# Patient Record
Sex: FEMALE | Race: WHITE | Employment: OTHER | URBAN - METROPOLITAN AREA
[De-identification: names, ages, dates, MRNs, and addresses within clinical notes are randomized per-mention and may not be internally consistent; named-entity substitution may affect disease eponyms.]

---

## 2017-05-23 ENCOUNTER — OFFICE VISIT (OUTPATIENT)
Dept: FAMILY MEDICINE CLINIC | Age: 57
End: 2017-05-23

## 2017-05-23 ENCOUNTER — TELEPHONE (OUTPATIENT)
Dept: FAMILY MEDICINE CLINIC | Age: 57
End: 2017-05-23

## 2017-05-23 VITALS
WEIGHT: 207 LBS | DIASTOLIC BLOOD PRESSURE: 84 MMHG | SYSTOLIC BLOOD PRESSURE: 136 MMHG | TEMPERATURE: 101.5 F | BODY MASS INDEX: 31.47 KG/M2

## 2017-05-23 DIAGNOSIS — Z00.00 PREVENTATIVE HEALTH CARE: ICD-10-CM

## 2017-05-23 DIAGNOSIS — J20.9 ACUTE BRONCHITIS, UNSPECIFIED ORGANISM: Primary | ICD-10-CM

## 2017-05-23 PROCEDURE — 99213 OFFICE O/P EST LOW 20 MIN: CPT | Performed by: FAMILY MEDICINE

## 2017-05-23 RX ORDER — LEVOFLOXACIN 500 MG/1
500 TABLET, FILM COATED ORAL DAILY
Qty: 10 TABLET | Refills: 0 | Status: SHIPPED | OUTPATIENT
Start: 2017-05-23 | End: 2017-06-02

## 2017-05-23 RX ORDER — CETIRIZINE HYDROCHLORIDE 10 MG/1
10 TABLET ORAL DAILY
COMMUNITY
End: 2017-07-11

## 2017-05-23 RX ORDER — GUAIFENESIN AND CODEINE PHOSPHATE 100; 10 MG/5ML; MG/5ML
5 SOLUTION ORAL 4 TIMES DAILY PRN
Qty: 180 ML | Refills: 0 | Status: SHIPPED | OUTPATIENT
Start: 2017-05-23 | End: 2017-06-02

## 2017-05-23 ASSESSMENT — ENCOUNTER SYMPTOMS
RHINORRHEA: 1
SHORTNESS OF BREATH: 1
COUGH: 1
SINUS PRESSURE: 0
WHEEZING: 0
SORE THROAT: 1

## 2017-05-26 ENCOUNTER — HOSPITAL ENCOUNTER (OUTPATIENT)
Dept: CT IMAGING | Age: 57
Discharge: HOME | End: 2017-05-26
Attending: FAMILY MEDICINE | Admitting: FAMILY MEDICINE

## 2017-05-26 ENCOUNTER — TELEPHONE (OUTPATIENT)
Dept: FAMILY MEDICINE CLINIC | Age: 57
End: 2017-05-26

## 2017-05-26 DIAGNOSIS — R50.9 FEVER, UNSPECIFIED FEVER CAUSE: ICD-10-CM

## 2017-05-26 DIAGNOSIS — R50.9 FEVER, UNSPECIFIED FEVER CAUSE: Primary | ICD-10-CM

## 2017-05-26 DIAGNOSIS — R05.9 COUGH: Primary | ICD-10-CM

## 2017-05-26 DIAGNOSIS — R05.9 COUGH: ICD-10-CM

## 2017-05-30 ENCOUNTER — TELEPHONE (OUTPATIENT)
Dept: PULMONOLOGY | Age: 57
End: 2017-05-30

## 2017-05-31 ENCOUNTER — OFFICE VISIT (OUTPATIENT)
Dept: PULMONOLOGY | Age: 57
End: 2017-05-31

## 2017-05-31 VITALS
BODY MASS INDEX: 31.64 KG/M2 | OXYGEN SATURATION: 98 % | HEART RATE: 95 BPM | DIASTOLIC BLOOD PRESSURE: 66 MMHG | SYSTOLIC BLOOD PRESSURE: 104 MMHG | RESPIRATION RATE: 20 BRPM | WEIGHT: 208.8 LBS | HEIGHT: 68 IN | TEMPERATURE: 98.9 F

## 2017-05-31 DIAGNOSIS — R50.9 FEVER, UNSPECIFIED FEVER CAUSE: ICD-10-CM

## 2017-05-31 DIAGNOSIS — R91.8 LUNG NODULES: ICD-10-CM

## 2017-05-31 DIAGNOSIS — R50.9 FEVER, UNSPECIFIED FEVER CAUSE: Primary | ICD-10-CM

## 2017-05-31 LAB
BASOPHILS ABSOLUTE: 0 K/UL (ref 0–0.2)
BASOPHILS RELATIVE PERCENT: 0.8 %
EOSINOPHILS ABSOLUTE: 0 K/UL (ref 0–0.6)
EOSINOPHILS RELATIVE PERCENT: 0.8 %
EPITHELIAL CELLS, UA: 4 /HPF (ref 0–5)
HCT VFR BLD CALC: 33.9 % (ref 36–48)
HEMOGLOBIN: 11 G/DL (ref 12–16)
HYALINE CASTS: 1 /LPF (ref 0–8)
LYMPHOCYTES ABSOLUTE: 1.9 K/UL (ref 1–5.1)
LYMPHOCYTES RELATIVE PERCENT: 37 %
MCH RBC QN AUTO: 32.5 PG (ref 26–34)
MCHC RBC AUTO-ENTMCNC: 32.5 G/DL (ref 31–36)
MCV RBC AUTO: 99.9 FL (ref 80–100)
MONOCYTES ABSOLUTE: 0.6 K/UL (ref 0–1.3)
MONOCYTES RELATIVE PERCENT: 11.2 %
NEUTROPHILS ABSOLUTE: 2.5 K/UL (ref 1.7–7.7)
NEUTROPHILS RELATIVE PERCENT: 50.2 %
PDW BLD-RTO: 16.4 % (ref 12.4–15.4)
PLATELET # BLD: 296 K/UL (ref 135–450)
PMV BLD AUTO: 7.8 FL (ref 5–10.5)
RBC # BLD: 3.39 M/UL (ref 4–5.2)
RBC UA: 2 /HPF (ref 0–4)
WBC # BLD: 5 K/UL (ref 4–11)
WBC UA: 1 /HPF (ref 0–5)

## 2017-05-31 PROCEDURE — 99204 OFFICE O/P NEW MOD 45 MIN: CPT | Performed by: INTERNAL MEDICINE

## 2017-06-01 LAB
T4 FREE: 1.1 NG/DL (ref 0.9–1.8)
TSH REFLEX FT4: 13.66 UIU/ML (ref 0.27–4.2)

## 2017-06-02 ENCOUNTER — TELEPHONE (OUTPATIENT)
Dept: PULMONOLOGY | Age: 57
End: 2017-06-02

## 2017-06-02 ENCOUNTER — HOSPITAL ENCOUNTER (OUTPATIENT)
Dept: OTHER | Age: 57
Discharge: HOME | End: 2017-06-02
Attending: INTERNAL MEDICINE | Admitting: INTERNAL MEDICINE

## 2017-06-02 DIAGNOSIS — Z01.818 PREOP TESTING: Primary | ICD-10-CM

## 2017-06-02 DIAGNOSIS — Z01.818 PRE-OP TESTING: Primary | ICD-10-CM

## 2017-06-02 LAB
INR BLD: 0.97 (ref 0.85–1.15)
PROTHROMBIN TIME: 11 SEC (ref 9.6–13)

## 2017-06-02 RX ORDER — SODIUM CHLORIDE 9 MG/ML
INJECTION, SOLUTION INTRAVENOUS CONTINUOUS
Status: CANCELLED | OUTPATIENT
Start: 2017-06-05

## 2017-06-05 ENCOUNTER — HOSPITAL ENCOUNTER (OUTPATIENT)
Dept: ENDOSCOPY | Age: 57
Discharge: HOME | End: 2017-06-05
Attending: INTERNAL MEDICINE | Admitting: INTERNAL MEDICINE

## 2017-06-05 VITALS
RESPIRATION RATE: 16 BRPM | SYSTOLIC BLOOD PRESSURE: 126 MMHG | HEART RATE: 80 BPM | TEMPERATURE: 96.9 F | DIASTOLIC BLOOD PRESSURE: 63 MMHG | OXYGEN SATURATION: 94 %

## 2017-06-05 LAB
BLOOD CULTURE, ROUTINE: NORMAL
CULTURE, BLOOD 2: NORMAL
THROAT CULTURE: NORMAL

## 2017-06-05 PROCEDURE — 31624 DX BRONCHOSCOPE/LAVAGE: CPT | Performed by: INTERNAL MEDICINE

## 2017-06-05 PROCEDURE — 99152 MOD SED SAME PHYS/QHP 5/>YRS: CPT | Performed by: INTERNAL MEDICINE

## 2017-06-05 RX ORDER — MIDAZOLAM HYDROCHLORIDE 1 MG/ML
INJECTION INTRAMUSCULAR; INTRAVENOUS
Status: COMPLETED | OUTPATIENT
Start: 2017-06-05 | End: 2017-06-05

## 2017-06-05 RX ORDER — FENTANYL CITRATE 50 UG/ML
INJECTION, SOLUTION INTRAMUSCULAR; INTRAVENOUS
Status: COMPLETED | OUTPATIENT
Start: 2017-06-05 | End: 2017-06-05

## 2017-06-05 RX ADMIN — MIDAZOLAM HYDROCHLORIDE 2 MG: 1 INJECTION INTRAMUSCULAR; INTRAVENOUS at 13:17

## 2017-06-05 RX ADMIN — FENTANYL CITRATE 50 MCG: 50 INJECTION, SOLUTION INTRAMUSCULAR; INTRAVENOUS at 13:17

## 2017-06-05 ASSESSMENT — PAIN SCALES - GENERAL
PAINLEVEL_OUTOF10: 0

## 2017-06-07 LAB
CULTURE, RESPIRATORY: NORMAL
GRAM STAIN RESULT: NORMAL
LEGIONELLA PNEUMOPHILIA DFA SOURCE: NORMAL
LEGIONELLA PNEUMOPHILIA DFA: NEGATIVE

## 2017-06-08 ENCOUNTER — TELEPHONE (OUTPATIENT)
Dept: PULMONOLOGY | Age: 57
End: 2017-06-08

## 2017-06-15 LAB
APPEARANCE BAL (LAVAGE): ABNORMAL
COLOR LAVAGE: COLORLESS
EPITHELIAL CELLS FLUID: 21 %
LYMPHOCYTES, BAL: 56 % (ref 5–10)
MACROPHAGES, BAL: 19 % (ref 90–95)
NUMBER OF CELLS COUNTED BAL (LAVAGE): 200
RBC, BAL: 11 /CUMM
SEGMENTED NEUTROPHILS, BAL: 4 % (ref 5–10)
WBC/EPI CELLS BAL: 171 /CUMM

## 2017-06-19 LAB
FINAL REPORT: NORMAL
PRELIMINARY: NORMAL

## 2017-06-29 ENCOUNTER — OFFICE VISIT (OUTPATIENT)
Dept: PULMONOLOGY | Age: 57
End: 2017-06-29

## 2017-06-29 VITALS
DIASTOLIC BLOOD PRESSURE: 70 MMHG | RESPIRATION RATE: 16 BRPM | OXYGEN SATURATION: 96 % | SYSTOLIC BLOOD PRESSURE: 102 MMHG | TEMPERATURE: 98 F | BODY MASS INDEX: 31.71 KG/M2 | WEIGHT: 209.2 LBS | HEIGHT: 68 IN | HEART RATE: 76 BPM

## 2017-06-29 DIAGNOSIS — R91.8 LUNG NODULES: ICD-10-CM

## 2017-06-29 DIAGNOSIS — R05.9 COUGH: Primary | ICD-10-CM

## 2017-06-29 PROCEDURE — 99213 OFFICE O/P EST LOW 20 MIN: CPT | Performed by: INTERNAL MEDICINE

## 2017-06-29 RX ORDER — PREDNISONE 20 MG/1
TABLET ORAL
Qty: 11 TABLET | Refills: 0 | Status: SHIPPED | OUTPATIENT
Start: 2017-06-29 | End: 2017-07-11 | Stop reason: ALTCHOICE

## 2017-07-10 LAB
FUNGUS (MYCOLOGY) CULTURE: NORMAL
FUNGUS STAIN: NORMAL

## 2017-07-11 ENCOUNTER — OFFICE VISIT (OUTPATIENT)
Dept: FAMILY MEDICINE CLINIC | Age: 57
End: 2017-07-11

## 2017-07-11 VITALS
SYSTOLIC BLOOD PRESSURE: 118 MMHG | HEIGHT: 68 IN | DIASTOLIC BLOOD PRESSURE: 78 MMHG | WEIGHT: 201 LBS | BODY MASS INDEX: 30.46 KG/M2

## 2017-07-11 DIAGNOSIS — Z00.00 WELL ADULT EXAM: Primary | ICD-10-CM

## 2017-07-11 DIAGNOSIS — Z12.11 SCREENING FOR COLON CANCER: ICD-10-CM

## 2017-07-11 DIAGNOSIS — Z00.00 PREVENTATIVE HEALTH CARE: ICD-10-CM

## 2017-07-11 LAB
ALT SERPL-CCNC: 32 U/L (ref 10–40)
ANION GAP SERPL CALCULATED.3IONS-SCNC: 18 MMOL/L (ref 3–16)
AST SERPL-CCNC: 28 U/L (ref 15–37)
BUN BLDV-MCNC: 16 MG/DL (ref 7–20)
CALCIUM SERPL-MCNC: 9.8 MG/DL (ref 8.3–10.6)
CHLORIDE BLD-SCNC: 93 MMOL/L (ref 99–110)
CHOLESTEROL, TOTAL: 239 MG/DL (ref 0–199)
CO2: 27 MMOL/L (ref 21–32)
CREAT SERPL-MCNC: 0.7 MG/DL (ref 0.6–1.1)
GFR AFRICAN AMERICAN: >60
GFR NON-AFRICAN AMERICAN: >60
GLUCOSE BLD-MCNC: 82 MG/DL (ref 70–99)
HCT VFR BLD CALC: 41.5 % (ref 36–48)
HDLC SERPL-MCNC: 55 MG/DL (ref 40–60)
HEMOGLOBIN: 13.9 G/DL (ref 12–16)
LDL CHOLESTEROL CALCULATED: 134 MG/DL
MCH RBC QN AUTO: 33.4 PG (ref 26–34)
MCHC RBC AUTO-ENTMCNC: 33.5 G/DL (ref 31–36)
MCV RBC AUTO: 99.6 FL (ref 80–100)
PDW BLD-RTO: 16.3 % (ref 12.4–15.4)
PLATELET # BLD: 326 K/UL (ref 135–450)
PMV BLD AUTO: 7.5 FL (ref 5–10.5)
POTASSIUM SERPL-SCNC: 3.5 MMOL/L (ref 3.5–5.1)
RBC # BLD: 4.17 M/UL (ref 4–5.2)
SODIUM BLD-SCNC: 138 MMOL/L (ref 136–145)
TRIGL SERPL-MCNC: 248 MG/DL (ref 0–150)
TSH SERPL DL<=0.05 MIU/L-ACNC: 0.97 UIU/ML (ref 0.27–4.2)
VLDLC SERPL CALC-MCNC: 50 MG/DL
WBC # BLD: 7.4 K/UL (ref 4–11)

## 2017-07-11 PROCEDURE — 99396 PREV VISIT EST AGE 40-64: CPT | Performed by: FAMILY MEDICINE

## 2017-07-11 PROCEDURE — 36415 COLL VENOUS BLD VENIPUNCTURE: CPT | Performed by: FAMILY MEDICINE

## 2017-07-11 PROCEDURE — 82274 ASSAY TEST FOR BLOOD FECAL: CPT | Performed by: FAMILY MEDICINE

## 2017-07-11 RX ORDER — FUROSEMIDE 40 MG/1
40 TABLET ORAL DAILY
Qty: 30 TABLET | Refills: 5 | Status: SHIPPED | OUTPATIENT
Start: 2017-07-11 | End: 2018-03-12 | Stop reason: SDUPTHER

## 2017-07-11 RX ORDER — LEVOTHYROXINE SODIUM 0.12 MG/1
TABLET ORAL
Qty: 30 TABLET | Refills: 5 | Status: SHIPPED | OUTPATIENT
Start: 2017-07-11 | End: 2017-08-02 | Stop reason: SDUPTHER

## 2017-07-11 ASSESSMENT — ENCOUNTER SYMPTOMS
BLOOD IN STOOL: 0
DIARRHEA: 0
SHORTNESS OF BREATH: 0
NAUSEA: 0
RHINORRHEA: 0
COUGH: 1
WHEEZING: 0
SORE THROAT: 0
VOMITING: 0
ABDOMINAL PAIN: 0
CONSTIPATION: 0

## 2017-07-12 DIAGNOSIS — E78.2 HYPERLIPIDEMIA, MIXED: ICD-10-CM

## 2017-07-24 LAB
CONTROL: NORMAL
HEMOCCULT STL QL: NEGATIVE

## 2017-07-25 LAB
AFB CULTURE (MYCOBACTERIA): NORMAL
AFB SMEAR: NORMAL

## 2017-08-02 RX ORDER — LEVOTHYROXINE SODIUM 0.12 MG/1
TABLET ORAL
Qty: 30 TABLET | Refills: 0 | Status: SHIPPED | OUTPATIENT
Start: 2017-08-02 | End: 2018-02-16 | Stop reason: SDUPTHER

## 2017-08-02 RX ORDER — METOLAZONE 2.5 MG/1
TABLET ORAL
Qty: 30 TABLET | Refills: 0 | Status: SHIPPED | OUTPATIENT
Start: 2017-08-02 | End: 2017-08-31 | Stop reason: SDUPTHER

## 2017-11-27 ENCOUNTER — OFFICE VISIT (OUTPATIENT)
Dept: FAMILY MEDICINE CLINIC | Age: 57
End: 2017-11-27

## 2017-11-27 VITALS
DIASTOLIC BLOOD PRESSURE: 80 MMHG | WEIGHT: 204 LBS | HEIGHT: 68 IN | BODY MASS INDEX: 30.92 KG/M2 | SYSTOLIC BLOOD PRESSURE: 132 MMHG

## 2017-11-27 DIAGNOSIS — R60.0 EDEMA OF BOTH LEGS: Primary | ICD-10-CM

## 2017-11-27 PROCEDURE — 99213 OFFICE O/P EST LOW 20 MIN: CPT | Performed by: FAMILY MEDICINE

## 2017-11-27 RX ORDER — AMOXICILLIN 875 MG/1
875 TABLET, COATED ORAL 2 TIMES DAILY
Qty: 20 TABLET | Refills: 0 | Status: SHIPPED | OUTPATIENT
Start: 2017-11-27 | End: 2017-12-07

## 2017-11-27 RX ORDER — GUAIFENESIN AND CODEINE PHOSPHATE 100; 10 MG/5ML; MG/5ML
5 SOLUTION ORAL 4 TIMES DAILY PRN
Qty: 180 ML | Refills: 0 | Status: SHIPPED | OUTPATIENT
Start: 2017-11-27 | End: 2017-12-07

## 2017-11-27 RX ORDER — FLUTICASONE PROPIONATE 50 MCG
2 SPRAY, SUSPENSION (ML) NASAL DAILY
Qty: 1 BOTTLE | Refills: 0 | Status: SHIPPED | OUTPATIENT
Start: 2017-11-27 | End: 2018-01-14

## 2017-11-27 ASSESSMENT — ENCOUNTER SYMPTOMS
SHORTNESS OF BREATH: 1
RHINORRHEA: 1
WHEEZING: 1
SINUS PRESSURE: 1
SINUS PAIN: 1
SORE THROAT: 1
COUGH: 1

## 2017-11-27 NOTE — PROGRESS NOTES
Subjective:      Patient ID: Pankaj Hamilton is a 64 y.o. female. HPI     Edema:  Patient takes Lasix 40 mg and Zaroxolyn 2.5 mg daily along with KCl 20 mEq daily. She feels that her edema is well controlled and stable. Upper Respiratory Infection:  Pt started 6 days ago with productive cough, wheezing and dyspnea. She has sinus pain and drainage. She has been taking Mucinex-DM with minimal relief. She is not a smoker. Review of Systems   Constitutional: Negative for chills and fever. HENT: Positive for postnasal drip, rhinorrhea, sinus pain, sinus pressure, sneezing and sore throat. Negative for congestion. Respiratory: Positive for cough, shortness of breath and wheezing. /80 (Site: Right Arm)   Ht 5' 8\" (1.727 m)   Wt 204 lb (92.5 kg)   BMI 31.02 kg/m²    Objective:   Physical Exam   Constitutional: She is oriented to person, place, and time. She appears well-developed and well-nourished. No distress. HENT:   Head: Normocephalic. Right Ear: External ear normal.   Left Ear: External ear normal.   Mouth/Throat: Oropharynx is clear and moist. No oropharyngeal exudate. Neck: No JVD present. No thyromegaly present. Cardiovascular: Normal rate, regular rhythm and normal heart sounds. No murmur heard. Pulmonary/Chest: Effort normal and breath sounds normal. She has no wheezes. She has no rales. Musculoskeletal: She exhibits no edema. Lymphadenopathy:     She has no cervical adenopathy. Neurological: She is alert and oriented to person, place, and time. Assessment:      Edema   Acute Maxillary Sinusitis       Plan:      Rx Amoxicillin 875 mg BID for 10 days. Rx Flonase NS once daily. Rx Tussi-Organidin for cough.    Reminded patient to have a GYN exam  RTO 8 months for Hypothyroidism / Edema

## 2018-01-15 ENCOUNTER — OFFICE VISIT (OUTPATIENT)
Dept: FAMILY MEDICINE CLINIC | Age: 58
End: 2018-01-15

## 2018-01-15 ENCOUNTER — HOSPITAL ENCOUNTER (OUTPATIENT)
Dept: OTHER | Age: 58
Discharge: HOME | End: 2018-01-15
Attending: FAMILY MEDICINE | Admitting: FAMILY MEDICINE

## 2018-01-15 VITALS
BODY MASS INDEX: 31.07 KG/M2 | WEIGHT: 205 LBS | SYSTOLIC BLOOD PRESSURE: 122 MMHG | HEIGHT: 68 IN | DIASTOLIC BLOOD PRESSURE: 82 MMHG

## 2018-01-15 DIAGNOSIS — R05.3 CHRONIC COUGH: ICD-10-CM

## 2018-01-15 DIAGNOSIS — J30.1 CHRONIC SEASONAL ALLERGIC RHINITIS DUE TO POLLEN: ICD-10-CM

## 2018-01-15 DIAGNOSIS — L40.50 PSORIATIC ARTHRITIS (HCC): ICD-10-CM

## 2018-01-15 DIAGNOSIS — E03.9 HYPOTHYROIDISM (ACQUIRED): Primary | ICD-10-CM

## 2018-01-15 DIAGNOSIS — R60.0 EDEMA OF BOTH LEGS: ICD-10-CM

## 2018-01-15 LAB
ANION GAP SERPL CALCULATED.3IONS-SCNC: 16 MMOL/L (ref 3–16)
BUN BLDV-MCNC: 15 MG/DL (ref 7–20)
CALCIUM SERPL-MCNC: 10.4 MG/DL (ref 8.3–10.6)
CHLORIDE BLD-SCNC: 95 MMOL/L (ref 99–110)
CO2: 31 MMOL/L (ref 21–32)
CREAT SERPL-MCNC: 0.8 MG/DL (ref 0.6–1.1)
GFR AFRICAN AMERICAN: >60
GFR NON-AFRICAN AMERICAN: >60
GLUCOSE BLD-MCNC: 78 MG/DL (ref 70–99)
POTASSIUM SERPL-SCNC: 3.5 MMOL/L (ref 3.5–5.1)
SODIUM BLD-SCNC: 142 MMOL/L (ref 136–145)

## 2018-01-15 PROCEDURE — 36415 COLL VENOUS BLD VENIPUNCTURE: CPT | Performed by: FAMILY MEDICINE

## 2018-01-15 PROCEDURE — 99214 OFFICE O/P EST MOD 30 MIN: CPT | Performed by: FAMILY MEDICINE

## 2018-01-15 RX ORDER — FLUTICASONE PROPIONATE 50 MCG
2 SPRAY, SUSPENSION (ML) NASAL DAILY
Qty: 1 BOTTLE | Refills: 5 | Status: SHIPPED | OUTPATIENT
Start: 2018-01-15 | End: 2018-05-25

## 2018-01-15 RX ORDER — MONTELUKAST SODIUM 10 MG/1
10 TABLET ORAL DAILY
Qty: 30 TABLET | Refills: 5 | Status: SHIPPED | OUTPATIENT
Start: 2018-01-15 | End: 2018-05-25

## 2018-01-15 ASSESSMENT — ENCOUNTER SYMPTOMS: COUGH: 1

## 2018-03-01 ENCOUNTER — OFFICE VISIT (OUTPATIENT)
Dept: ORTHOPEDIC SURGERY | Age: 58
End: 2018-03-01

## 2018-03-01 VITALS
BODY MASS INDEX: 31.07 KG/M2 | HEART RATE: 92 BPM | SYSTOLIC BLOOD PRESSURE: 129 MMHG | DIASTOLIC BLOOD PRESSURE: 88 MMHG | HEIGHT: 68 IN | WEIGHT: 205.03 LBS

## 2018-03-01 DIAGNOSIS — M25.531 RIGHT WRIST PAIN: Primary | ICD-10-CM

## 2018-03-01 DIAGNOSIS — M65.4 DE QUERVAIN'S DISEASE (RADIAL STYLOID TENOSYNOVITIS): ICD-10-CM

## 2018-03-01 PROCEDURE — 99203 OFFICE O/P NEW LOW 30 MIN: CPT | Performed by: FAMILY MEDICINE

## 2018-03-01 PROCEDURE — 20550 NJX 1 TENDON SHEATH/LIGAMENT: CPT | Performed by: FAMILY MEDICINE

## 2018-03-01 NOTE — PROGRESS NOTES
deformity or injury. Range of motion is unremarkable. There is no gross instability. There are no rashes, ulcerations or lesions. Strength and tone are normal.  Neck: Examination of the neck does not show any tenderness, deformity or injury. Range of motion is unremarkable. There is no gross instability. There are no rashes, ulcerations or lesions. Strength and tone are normal.      Diagnostic Test Findings:  Right wrist AP lateral and oblique films were obtained today and do show mild degenerative changes or the 1st Aia 16 joint without evidence of high-grade osseous injury. Assessment:  #1.  2 3 months status post symptomatic right wrist de Quervain's tenosynovitis    Impression:  Encounter Diagnoses   Name Primary?  Right wrist pain Yes    De Quervain's disease (radial styloid tenosynovitis)        Office Procedures:  Orders Placed This Encounter   Procedures    XR WRIST RIGHT (MIN 3 VIEWS)     Order Specific Question:   Reason for exam:     Answer:   pain    External Referral To Occupational Therapy     Referral Priority:   Routine     Referral Type:   Eval and Treat     Referral Reason:   Specialty Services Required     Referred to Provider: Ruben Cuellar OT     Requested Specialty:   Occupational Therapy     Number of Visits Requested:   1    20550 - MS INJECT TENDON SHEATH/LIGAMENT    MS BETAMETHASONE ACET&SOD PHOSP       Treatment Plan:  Treatment options were discussed with Tatum Vinson. We did review her plain films and exam findings. I think we are primarily dealing with overuse right wrist de Quervain's tenosynovitis. I would recommend that she continues to use her thumb spica splint and we'll start her in hand therapy. She may wish to consider an Orthoplast splint. We did perform a right wrist 1st dorsal compartment steroid sheath injection using 1 mL of Celestone, 1 Marcaine, 1 Xylocaine. She tolerated this well.   She will continue with her psoriatic arthritis medications

## 2018-03-27 ENCOUNTER — OFFICE VISIT (OUTPATIENT)
Dept: ORTHOPEDIC SURGERY | Age: 58
End: 2018-03-27

## 2018-03-27 VITALS
HEART RATE: 91 BPM | HEIGHT: 68 IN | BODY MASS INDEX: 31.07 KG/M2 | SYSTOLIC BLOOD PRESSURE: 120 MMHG | DIASTOLIC BLOOD PRESSURE: 81 MMHG | WEIGHT: 205.03 LBS

## 2018-03-27 DIAGNOSIS — R20.0 BILATERAL HAND NUMBNESS: ICD-10-CM

## 2018-03-27 DIAGNOSIS — M65.4 DE QUERVAIN'S DISEASE (RADIAL STYLOID TENOSYNOVITIS): ICD-10-CM

## 2018-03-27 DIAGNOSIS — M25.531 RIGHT WRIST PAIN: Primary | ICD-10-CM

## 2018-03-27 PROCEDURE — 99214 OFFICE O/P EST MOD 30 MIN: CPT | Performed by: FAMILY MEDICINE

## 2018-03-27 NOTE — PROGRESS NOTES
awake particular during driving and occasionally at night. She has been continuing with her anti-inflammatories. She has been using her thumb spica splint. She has had 3 sessions of formal hand therapy thus far. She once again does have a history of psoriatic arthritis. She has not had recent EMG testing but both of her sisters and her mom that had previous carpal tunnel release. Medical History  Patient's medications, allergies, past medical, surgical, social and family histories were reviewed and updated as appropriate. Review of Systems  Relevant review of systems reviewed on 3/1/2018 and available in the patient's chart under the media tab. Vital Signs  Vitals:    03/27/18 1510   BP: 120/81   Pulse: 91       General Exam:   Constitutional: Patient is adequately groomed with no evidence of malnutrition  DTRs: Deep tendon reflexes are intact  Mental Status: The patient is oriented to time, place and person. The patient's mood and affect are appropriate. Lymphatic: The lymphatic examination bilaterally reveals all areas to be without enlargement or induration. Vascular: Examination reveals no swelling or calf tenderness. Peripheral pulses are palpable and 2+. Neurological: The patient has good coordination. There is no weakness or sensory deficit. Wrist Examination    Inspection:  There is no high-grade deformity although she does have some psoriatic degenerative changes to the 2nd ray MCP joint of her right hand. There is no established atrophy. Less swelling over the 1st dorsal compartment involving the right wrist.    Palpation: Less prominent Tenderness over the 1st dorsal compartment reproducing symptoms right wrist.  No tenderness over the Tinel's cubital and carpal tunnel. Rang of Motion:  Reasonable wrist and hand range of motion.     Strength:  Tendon function and strength appears to be intact but  strength testing and resisted extension does produce pain in the range of 3-4/10. Special Tests:  Less prominent pain with Finkelstein's reproducing symptoms on the right. She does appear to have trace positive Tinel's at the carpal tunnel bilaterally. Negative Tinel's cubital tunnel. Positive bilateral Phalen's at 8-10 seconds bilaterally. Skin: There are no rashes, ulcerations or lesions. Distal motor sensory and vascular exams intact. Her sensory exam appears to be intact. Sensation: Distal sensory exam is intact    Circulation: Distal vascular exam is intact    Gait:  Fluid smooth gait    Reflexes:  Symmetrically preserved    Additional Comments:     Additional Examinations:  Contralateral Exam: Contralateral left wrist exam is intact. Right Upper Extremity:  Examination of the right upper extremity does not show any tenderness, deformity or injury. Range of motion is unremarkable. There is no gross instability. There are no rashes, ulcerations or lesions. Strength and tone are normal.  Left Upper Extremity: Examination of the left upper extremity does not show any tenderness, deformity or injury. Range of motion is unremarkable. There is no gross instability. There are no rashes, ulcerations or lesions. Strength and tone are normal.  Neck: Examination of the neck does not show any tenderness, deformity or injury. Range of motion is unremarkable. There is no gross instability. There are no rashes, ulcerations or lesions. Strength and tone are normal.      Diagnostic Test Findings:      Assessment:  #1.  3-4 months status post partially improved symptomatic right wrist de Quervain's tenosynovitis with right greater than left hand weakness numbness and tingling with positive family history of carpal tunnel    Impression:  Encounter Diagnoses   Name Primary?     Right wrist pain Yes    De Quervain's disease (radial styloid tenosynovitis)     Bilateral hand numbness        Office Procedures:  No orders of the defined types were placed in this

## 2018-04-12 ENCOUNTER — OFFICE VISIT (OUTPATIENT)
Dept: ORTHOPEDIC SURGERY | Age: 58
End: 2018-04-12

## 2018-04-12 VITALS
BODY MASS INDEX: 31.07 KG/M2 | DIASTOLIC BLOOD PRESSURE: 78 MMHG | WEIGHT: 205 LBS | SYSTOLIC BLOOD PRESSURE: 112 MMHG | HEIGHT: 68 IN | HEART RATE: 83 BPM

## 2018-04-12 DIAGNOSIS — M25.531 RIGHT WRIST PAIN: ICD-10-CM

## 2018-04-12 DIAGNOSIS — M65.4 DE QUERVAIN'S TENOSYNOVITIS, RIGHT: ICD-10-CM

## 2018-04-12 DIAGNOSIS — G56.01 CARPAL TUNNEL SYNDROME, RIGHT: Primary | ICD-10-CM

## 2018-04-12 PROCEDURE — 20526 THER INJECTION CARP TUNNEL: CPT | Performed by: FAMILY MEDICINE

## 2018-04-12 PROCEDURE — 99213 OFFICE O/P EST LOW 20 MIN: CPT | Performed by: FAMILY MEDICINE

## 2018-04-12 RX ORDER — METHYLPREDNISOLONE 4 MG/1
TABLET ORAL
Qty: 21 KIT | Refills: 0 | Status: SHIPPED | OUTPATIENT
Start: 2018-04-12 | End: 2018-05-07

## 2018-05-07 ENCOUNTER — TELEPHONE (OUTPATIENT)
Dept: FAMILY MEDICINE CLINIC | Age: 58
End: 2018-05-07

## 2018-05-07 DIAGNOSIS — E87.6 HYPOKALEMIA: Primary | ICD-10-CM

## 2018-05-09 DIAGNOSIS — E87.6 HYPOKALEMIA: ICD-10-CM

## 2018-05-09 LAB
ANION GAP SERPL CALCULATED.3IONS-SCNC: 17 MMOL/L (ref 3–16)
BUN BLDV-MCNC: 18 MG/DL (ref 7–20)
CALCIUM SERPL-MCNC: 10.2 MG/DL (ref 8.3–10.6)
CHLORIDE BLD-SCNC: 92 MMOL/L (ref 99–110)
CO2: 28 MMOL/L (ref 21–32)
CREAT SERPL-MCNC: 0.7 MG/DL (ref 0.6–1.1)
GFR AFRICAN AMERICAN: >60
GFR NON-AFRICAN AMERICAN: >60
GLUCOSE BLD-MCNC: 87 MG/DL (ref 70–99)
POTASSIUM SERPL-SCNC: 3.2 MMOL/L (ref 3.5–5.1)
SODIUM BLD-SCNC: 137 MMOL/L (ref 136–145)

## 2018-05-10 ENCOUNTER — OFFICE VISIT (OUTPATIENT)
Dept: ORTHOPEDIC SURGERY | Age: 58
End: 2018-05-10

## 2018-05-10 VITALS
BODY MASS INDEX: 31.07 KG/M2 | HEART RATE: 79 BPM | SYSTOLIC BLOOD PRESSURE: 103 MMHG | DIASTOLIC BLOOD PRESSURE: 73 MMHG | HEIGHT: 68 IN | WEIGHT: 205 LBS

## 2018-05-10 DIAGNOSIS — E87.6 HYPOKALEMIA: Primary | ICD-10-CM

## 2018-05-10 DIAGNOSIS — M25.531 RIGHT WRIST PAIN: ICD-10-CM

## 2018-05-10 DIAGNOSIS — G56.01 CARPAL TUNNEL SYNDROME, RIGHT: ICD-10-CM

## 2018-05-10 DIAGNOSIS — M65.4 DE QUERVAIN'S TENOSYNOVITIS, RIGHT: Primary | ICD-10-CM

## 2018-05-10 PROCEDURE — 99213 OFFICE O/P EST LOW 20 MIN: CPT | Performed by: FAMILY MEDICINE

## 2018-05-10 RX ORDER — POTASSIUM CHLORIDE 20 MEQ/1
20 TABLET, EXTENDED RELEASE ORAL 2 TIMES DAILY
Qty: 60 TABLET | Refills: 0 | Status: SHIPPED | OUTPATIENT
Start: 2018-05-10 | End: 2018-05-24 | Stop reason: SDUPTHER

## 2018-05-14 ENCOUNTER — OFFICE VISIT (OUTPATIENT)
Dept: ORTHOPEDIC SURGERY | Age: 58
End: 2018-05-14

## 2018-05-14 VITALS — WEIGHT: 200 LBS | HEIGHT: 68 IN | BODY MASS INDEX: 30.31 KG/M2

## 2018-05-14 DIAGNOSIS — M65.4 DE QUERVAIN'S TENOSYNOVITIS: Primary | ICD-10-CM

## 2018-05-14 DIAGNOSIS — G56.01 CARPAL TUNNEL SYNDROME ON RIGHT: ICD-10-CM

## 2018-05-14 PROCEDURE — 99243 OFF/OP CNSLTJ NEW/EST LOW 30: CPT | Performed by: ORTHOPAEDIC SURGERY

## 2018-05-23 DIAGNOSIS — E87.6 HYPOKALEMIA: ICD-10-CM

## 2018-05-23 LAB
ANION GAP SERPL CALCULATED.3IONS-SCNC: 15 MMOL/L (ref 3–16)
BUN BLDV-MCNC: 16 MG/DL (ref 7–20)
CALCIUM SERPL-MCNC: 9.5 MG/DL (ref 8.3–10.6)
CHLORIDE BLD-SCNC: 92 MMOL/L (ref 99–110)
CO2: 32 MMOL/L (ref 21–32)
CREAT SERPL-MCNC: 0.7 MG/DL (ref 0.6–1.1)
GFR AFRICAN AMERICAN: >60
GFR NON-AFRICAN AMERICAN: >60
GLUCOSE BLD-MCNC: 99 MG/DL (ref 70–99)
POTASSIUM SERPL-SCNC: 2.9 MMOL/L (ref 3.5–5.1)
SODIUM BLD-SCNC: 139 MMOL/L (ref 136–145)

## 2018-05-24 ENCOUNTER — TELEPHONE (OUTPATIENT)
Dept: ORTHOPEDIC SURGERY | Age: 58
End: 2018-05-24

## 2018-05-24 DIAGNOSIS — E87.6 HYPOKALEMIA: Primary | ICD-10-CM

## 2018-05-24 RX ORDER — POTASSIUM CHLORIDE 20 MEQ/1
20 TABLET, EXTENDED RELEASE ORAL 3 TIMES DAILY
Qty: 90 TABLET | Refills: 0 | Status: SHIPPED | OUTPATIENT
Start: 2018-05-24 | End: 2018-06-21 | Stop reason: SDUPTHER

## 2018-05-30 DIAGNOSIS — E87.6 HYPOKALEMIA: ICD-10-CM

## 2018-05-30 LAB
ANION GAP SERPL CALCULATED.3IONS-SCNC: 17 MMOL/L (ref 3–16)
BUN BLDV-MCNC: 19 MG/DL (ref 7–20)
CALCIUM SERPL-MCNC: 10 MG/DL (ref 8.3–10.6)
CHLORIDE BLD-SCNC: 100 MMOL/L (ref 99–110)
CO2: 24 MMOL/L (ref 21–32)
CREAT SERPL-MCNC: 0.8 MG/DL (ref 0.6–1.1)
GFR AFRICAN AMERICAN: >60
GFR NON-AFRICAN AMERICAN: >60
GLUCOSE BLD-MCNC: 81 MG/DL (ref 70–99)
POTASSIUM SERPL-SCNC: 4.1 MMOL/L (ref 3.5–5.1)
SODIUM BLD-SCNC: 141 MMOL/L (ref 136–145)

## 2018-05-31 ENCOUNTER — OFFICE VISIT (OUTPATIENT)
Dept: FAMILY MEDICINE CLINIC | Age: 58
End: 2018-05-31

## 2018-05-31 VITALS
DIASTOLIC BLOOD PRESSURE: 80 MMHG | SYSTOLIC BLOOD PRESSURE: 120 MMHG | HEIGHT: 68 IN | BODY MASS INDEX: 31.67 KG/M2 | WEIGHT: 209 LBS

## 2018-05-31 DIAGNOSIS — Z01.818 PREOPERATIVE GENERAL PHYSICAL EXAMINATION: Primary | ICD-10-CM

## 2018-05-31 DIAGNOSIS — G56.01 RIGHT CARPAL TUNNEL SYNDROME: ICD-10-CM

## 2018-05-31 DIAGNOSIS — M65.4 TENOSYNOVITIS, DE QUERVAIN: ICD-10-CM

## 2018-05-31 PROCEDURE — 99242 OFF/OP CONSLTJ NEW/EST SF 20: CPT | Performed by: FAMILY MEDICINE

## 2018-05-31 ASSESSMENT — ENCOUNTER SYMPTOMS
RHINORRHEA: 0
DIARRHEA: 0
WHEEZING: 0
ABDOMINAL PAIN: 0
SHORTNESS OF BREATH: 0
BLOOD IN STOOL: 0
NAUSEA: 0
COUGH: 1
CONSTIPATION: 0
SORE THROAT: 0
VOMITING: 0

## 2018-06-05 ENCOUNTER — HOSPITAL ENCOUNTER (OUTPATIENT)
Dept: SURGERY | Age: 58
Discharge: HOME | End: 2018-06-05
Attending: ORTHOPAEDIC SURGERY | Admitting: ORTHOPAEDIC SURGERY

## 2018-06-05 VITALS
BODY MASS INDEX: 31.67 KG/M2 | WEIGHT: 209 LBS | HEART RATE: 59 BPM | HEIGHT: 68 IN | DIASTOLIC BLOOD PRESSURE: 59 MMHG | SYSTOLIC BLOOD PRESSURE: 104 MMHG | RESPIRATION RATE: 13 BRPM | TEMPERATURE: 97.4 F | OXYGEN SATURATION: 94 %

## 2018-06-05 RX ORDER — PROMETHAZINE HYDROCHLORIDE 25 MG/ML
6.25 INJECTION, SOLUTION INTRAMUSCULAR; INTRAVENOUS
Status: ACTIVE | OUTPATIENT
Start: 2018-06-05 | End: 2018-06-05

## 2018-06-05 RX ORDER — MORPHINE SULFATE 2 MG/ML
2 INJECTION, SOLUTION INTRAMUSCULAR; INTRAVENOUS EVERY 5 MIN PRN
Status: DISCONTINUED | OUTPATIENT
Start: 2018-06-05 | End: 2018-06-06 | Stop reason: HOSPADM

## 2018-06-05 RX ORDER — KETOROLAC TROMETHAMINE 30 MG/ML
30 INJECTION, SOLUTION INTRAMUSCULAR; INTRAVENOUS ONCE
Status: COMPLETED | OUTPATIENT
Start: 2018-06-05 | End: 2018-06-05

## 2018-06-05 RX ORDER — MEPERIDINE HYDROCHLORIDE 50 MG/ML
12.5 INJECTION INTRAMUSCULAR; INTRAVENOUS; SUBCUTANEOUS EVERY 5 MIN PRN
Status: DISCONTINUED | OUTPATIENT
Start: 2018-06-05 | End: 2018-06-06 | Stop reason: HOSPADM

## 2018-06-05 RX ORDER — ONDANSETRON 2 MG/ML
4 INJECTION INTRAMUSCULAR; INTRAVENOUS
Status: ACTIVE | OUTPATIENT
Start: 2018-06-05 | End: 2018-06-05

## 2018-06-05 RX ORDER — DIPHENHYDRAMINE HYDROCHLORIDE 50 MG/ML
12.5 INJECTION INTRAMUSCULAR; INTRAVENOUS
Status: ACTIVE | OUTPATIENT
Start: 2018-06-05 | End: 2018-06-05

## 2018-06-05 RX ORDER — KETOROLAC TROMETHAMINE 30 MG/ML
INJECTION, SOLUTION INTRAMUSCULAR; INTRAVENOUS
Status: DISPENSED
Start: 2018-06-05 | End: 2018-06-05

## 2018-06-05 RX ORDER — LABETALOL HYDROCHLORIDE 5 MG/ML
5 INJECTION, SOLUTION INTRAVENOUS EVERY 10 MIN PRN
Status: DISCONTINUED | OUTPATIENT
Start: 2018-06-05 | End: 2018-06-06 | Stop reason: HOSPADM

## 2018-06-05 RX ORDER — SODIUM CHLORIDE, SODIUM LACTATE, POTASSIUM CHLORIDE, CALCIUM CHLORIDE 600; 310; 30; 20 MG/100ML; MG/100ML; MG/100ML; MG/100ML
INJECTION, SOLUTION INTRAVENOUS CONTINUOUS
Status: DISCONTINUED | OUTPATIENT
Start: 2018-06-05 | End: 2018-06-06 | Stop reason: HOSPADM

## 2018-06-05 RX ORDER — MORPHINE SULFATE 2 MG/ML
1 INJECTION, SOLUTION INTRAMUSCULAR; INTRAVENOUS EVERY 5 MIN PRN
Status: DISCONTINUED | OUTPATIENT
Start: 2018-06-05 | End: 2018-06-06 | Stop reason: HOSPADM

## 2018-06-05 RX ORDER — HYDRALAZINE HYDROCHLORIDE 20 MG/ML
5 INJECTION INTRAMUSCULAR; INTRAVENOUS EVERY 10 MIN PRN
Status: DISCONTINUED | OUTPATIENT
Start: 2018-06-05 | End: 2018-06-06 | Stop reason: HOSPADM

## 2018-06-05 RX ADMIN — Medication 0.5 MG: at 09:12

## 2018-06-05 RX ADMIN — SODIUM CHLORIDE, SODIUM LACTATE, POTASSIUM CHLORIDE, CALCIUM CHLORIDE: 600; 310; 30; 20 INJECTION, SOLUTION INTRAVENOUS at 07:04

## 2018-06-05 RX ADMIN — Medication 0.5 MG: at 08:52

## 2018-06-05 RX ADMIN — Medication 0.5 MG: at 09:00

## 2018-06-05 RX ADMIN — KETOROLAC TROMETHAMINE 30 MG: 30 INJECTION, SOLUTION INTRAMUSCULAR; INTRAVENOUS at 09:32

## 2018-06-05 ASSESSMENT — PAIN - FUNCTIONAL ASSESSMENT: PAIN_FUNCTIONAL_ASSESSMENT: 0-10

## 2018-06-05 ASSESSMENT — PAIN SCALES - GENERAL
PAINLEVEL_OUTOF10: 8
PAINLEVEL_OUTOF10: 8
PAINLEVEL_OUTOF10: 3
PAINLEVEL_OUTOF10: 8
PAINLEVEL_OUTOF10: 8
PAINLEVEL_OUTOF10: 4
PAINLEVEL_OUTOF10: 8
PAINLEVEL_OUTOF10: 4
PAINLEVEL_OUTOF10: 8
PAINLEVEL_OUTOF10: 3

## 2018-06-05 ASSESSMENT — PAIN DESCRIPTION - ORIENTATION: ORIENTATION: RIGHT

## 2018-06-05 ASSESSMENT — PAIN DESCRIPTION - LOCATION: LOCATION: WRIST

## 2018-06-18 ENCOUNTER — OFFICE VISIT (OUTPATIENT)
Dept: ORTHOPEDIC SURGERY | Age: 58
End: 2018-06-18

## 2018-06-18 DIAGNOSIS — M65.4 DE QUERVAIN'S TENOSYNOVITIS: ICD-10-CM

## 2018-06-18 DIAGNOSIS — G56.01 CARPAL TUNNEL SYNDROME ON RIGHT: Primary | ICD-10-CM

## 2018-06-18 PROCEDURE — 99024 POSTOP FOLLOW-UP VISIT: CPT | Performed by: ORTHOPAEDIC SURGERY

## 2018-06-21 RX ORDER — POTASSIUM CHLORIDE 20 MEQ/1
TABLET, EXTENDED RELEASE ORAL
Qty: 90 TABLET | Refills: 0 | Status: SHIPPED | OUTPATIENT
Start: 2018-06-21 | End: 2018-07-12 | Stop reason: SDUPTHER

## 2018-06-21 RX ORDER — LEVOTHYROXINE SODIUM 0.12 MG/1
TABLET ORAL
Qty: 30 TABLET | Refills: 0 | Status: SHIPPED | OUTPATIENT
Start: 2018-06-21 | End: 2018-07-12 | Stop reason: SDUPTHER

## 2018-06-21 RX ORDER — FUROSEMIDE 40 MG/1
40 TABLET ORAL DAILY
Qty: 30 TABLET | Refills: 0 | Status: SHIPPED | OUTPATIENT
Start: 2018-06-21 | End: 2018-07-12 | Stop reason: SDUPTHER

## 2018-07-10 NOTE — PROGRESS NOTES
Subjective:      Patient ID: General Parker is a 62 y.o. female. HPI  COMPLETE PHYSICAL / BIOMETRICS:    Patient is here today for a complete physical.  She is feeling well and is fasting for labs. Hypothyroidism:  Pt is tolerating and compliant with Synthroid 125 mcg daily. She is due today for a TSH repeat.       Edema:  Patient takes Lasix 40 mg and Zaroxolyn 2.5 mg daily along with KCl 20 mEq daily.  She feels that her edema is well controlled and stable.       Psoriatic Arthritis:  Patient sees Dr. Risa Merritt and is on Stelara (every 12 weeks) , Mobic 15 mg daily and Methotrexate.      Allergies   Allergen Reactions    Adhesive Tape Rash    Percocet [Oxycodone-Acetaminophen] Itching and Rash    Benzamide Derivatives Other (See Comments)     From nasal spray-instant migraine headache,in contact solution-red,itchy eye    Sulfasalazine Nausea Only     Pt states she also had some fatigue with this med     Current Outpatient Prescriptions   Medication Sig Dispense Refill    potassium chloride (KLOR-CON M) 20 MEQ extended release tablet TAKE 1 TABLET BY MOUTH THREE TIMES DAILY 90 tablet 0    levothyroxine (SYNTHROID) 125 MCG tablet TAKE 1 TABLET BY MOUTH EVERY DAY 30 tablet 0    furosemide (LASIX) 40 MG tablet TAKE 1 TABLET BY MOUTH DAILY 30 tablet 0    metolazone (ZAROXOLYN) 2.5 MG tablet TAKE 1 TABLET BY MOUTH DAILY 30 tablet 6    Multiple Vitamins-Minerals (MULTIVITAMIN ADULT PO) Take 1 tablet by mouth daily      ustekinumab (STELARA) 45 MG/0.5ML SOSY injection Inject 45 mg into the skin Every 12 weeks      folic acid (FOLVITE) 1 MG tablet Take 2 mg by mouth daily   11    methotrexate (RHEUMATREX) 2.5 MG chemo tablet Take 20 mg by mouth once a week   1    meloxicam (MOBIC) 15 MG tablet Take 15 mg by mouth daily.  aspirin 81 MG EC tablet Take 81 mg by mouth daily. No current facility-administered medications for this visit.       Past Medical History:   Diagnosis Date    diarrhea, nausea and vomiting. Genitourinary: Negative for dysuria, frequency, hematuria and urgency. Musculoskeletal: Positive for arthralgias. Psychiatric/Behavioral: Negative for dysphoric mood and suicidal ideas. /80 (Site: Left Arm)   Ht 5' 8\" (1.727 m)   Wt 212 lb (96.2 kg)   BMI 32.23 kg/m²    Objective:   Physical Exam   Constitutional: She is oriented to person, place, and time. She appears well-developed and well-nourished. No distress. HENT:   Head: Normocephalic. Right Ear: External ear normal.   Left Ear: External ear normal.   Mouth/Throat: Oropharynx is clear and moist. No oropharyngeal exudate. Neck: No JVD present. No thyromegaly present. Cardiovascular: Normal rate, regular rhythm and normal heart sounds. No murmur heard. Pulmonary/Chest: Effort normal and breath sounds normal. She has no wheezes. She has no rales. Abdominal: Soft. Bowel sounds are normal. She exhibits no distension and no mass. There is no tenderness. There is no rebound and no guarding. Musculoskeletal: She exhibits edema. 2+ bilateral PTE. Lymphadenopathy:     She has no cervical adenopathy. Neurological: She is alert and oriented to person, place, and time. Assessment:      Well Adult Exam  Preventative Health Care      Plan:      CBC, Chem 7, TSH, Lipid Profile, ALT, AST   Continue current medications   Rx given for Shingrix shot at the pharmacy. Patient had a GYN exam 2 days ago and will have a mammogram today.    Referral given for Colonoscopy  RTO 6 months for Edema

## 2018-07-12 ENCOUNTER — OFFICE VISIT (OUTPATIENT)
Dept: FAMILY MEDICINE CLINIC | Age: 58
End: 2018-07-12

## 2018-07-12 ENCOUNTER — HOSPITAL ENCOUNTER (OUTPATIENT)
Dept: WOMENS IMAGING | Age: 58
Discharge: HOME | End: 2018-07-12
Attending: FAMILY MEDICINE | Admitting: FAMILY MEDICINE

## 2018-07-12 VITALS
BODY MASS INDEX: 32.13 KG/M2 | HEIGHT: 68 IN | SYSTOLIC BLOOD PRESSURE: 120 MMHG | WEIGHT: 212 LBS | DIASTOLIC BLOOD PRESSURE: 80 MMHG

## 2018-07-12 DIAGNOSIS — Z23 NEED FOR ZOSTER VACCINATION: ICD-10-CM

## 2018-07-12 DIAGNOSIS — Z12.31 VISIT FOR SCREENING MAMMOGRAM: ICD-10-CM

## 2018-07-12 DIAGNOSIS — Z00.00 WELL ADULT EXAM: Primary | ICD-10-CM

## 2018-07-12 DIAGNOSIS — Z00.00 PREVENTATIVE HEALTH CARE: ICD-10-CM

## 2018-07-12 LAB
ALT SERPL-CCNC: 39 U/L (ref 10–40)
ANION GAP SERPL CALCULATED.3IONS-SCNC: 15 MMOL/L (ref 3–16)
AST SERPL-CCNC: 29 U/L (ref 15–37)
BUN BLDV-MCNC: 14 MG/DL (ref 7–20)
CALCIUM SERPL-MCNC: 10 MG/DL (ref 8.3–10.6)
CHLORIDE BLD-SCNC: 98 MMOL/L (ref 99–110)
CHOLESTEROL, TOTAL: 220 MG/DL (ref 0–199)
CO2: 27 MMOL/L (ref 21–32)
CREAT SERPL-MCNC: 0.7 MG/DL (ref 0.6–1.1)
GFR AFRICAN AMERICAN: >60
GFR NON-AFRICAN AMERICAN: >60
GLUCOSE BLD-MCNC: 81 MG/DL (ref 70–99)
HCT VFR BLD CALC: 39.2 % (ref 36–48)
HDLC SERPL-MCNC: 48 MG/DL (ref 40–60)
HEMOGLOBIN: 13.7 G/DL (ref 12–16)
LDL CHOLESTEROL CALCULATED: 145 MG/DL
MCH RBC QN AUTO: 34.1 PG (ref 26–34)
MCHC RBC AUTO-ENTMCNC: 34.8 G/DL (ref 31–36)
MCV RBC AUTO: 97.8 FL (ref 80–100)
PDW BLD-RTO: 14.2 % (ref 12.4–15.4)
PLATELET # BLD: 345 K/UL (ref 135–450)
PMV BLD AUTO: 8.3 FL (ref 5–10.5)
POTASSIUM SERPL-SCNC: 3.7 MMOL/L (ref 3.5–5.1)
RBC # BLD: 4.01 M/UL (ref 4–5.2)
SODIUM BLD-SCNC: 140 MMOL/L (ref 136–145)
TRIGL SERPL-MCNC: 137 MG/DL (ref 0–150)
TSH SERPL DL<=0.05 MIU/L-ACNC: 0.4 UIU/ML (ref 0.27–4.2)
VLDLC SERPL CALC-MCNC: 27 MG/DL
WBC # BLD: 6 K/UL (ref 4–11)

## 2018-07-12 PROCEDURE — 36415 COLL VENOUS BLD VENIPUNCTURE: CPT | Performed by: FAMILY MEDICINE

## 2018-07-12 PROCEDURE — 99396 PREV VISIT EST AGE 40-64: CPT | Performed by: FAMILY MEDICINE

## 2018-07-12 RX ORDER — LEVOTHYROXINE SODIUM 0.12 MG/1
TABLET ORAL
Qty: 30 TABLET | Refills: 5 | Status: SHIPPED | OUTPATIENT
Start: 2018-07-12 | End: 2019-01-01 | Stop reason: SDUPTHER

## 2018-07-12 RX ORDER — POTASSIUM CHLORIDE 20 MEQ/1
TABLET, EXTENDED RELEASE ORAL
Qty: 90 TABLET | Refills: 5 | Status: SHIPPED | OUTPATIENT
Start: 2018-07-12 | End: 2018-08-03

## 2018-07-12 RX ORDER — FUROSEMIDE 40 MG/1
40 TABLET ORAL DAILY
Qty: 30 TABLET | Refills: 5 | Status: SHIPPED | OUTPATIENT
Start: 2018-07-12 | End: 2019-02-15 | Stop reason: SDUPTHER

## 2018-07-12 ASSESSMENT — ENCOUNTER SYMPTOMS
COUGH: 0
CONSTIPATION: 0
ABDOMINAL PAIN: 0
VOMITING: 0
BLOOD IN STOOL: 0
WHEEZING: 0
SHORTNESS OF BREATH: 0
NAUSEA: 0
SORE THROAT: 0
DIARRHEA: 0
RHINORRHEA: 0

## 2018-07-12 ASSESSMENT — PATIENT HEALTH QUESTIONNAIRE - PHQ9
SUM OF ALL RESPONSES TO PHQ QUESTIONS 1-9: 0
1. LITTLE INTEREST OR PLEASURE IN DOING THINGS: 0
SUM OF ALL RESPONSES TO PHQ9 QUESTIONS 1 & 2: 0
2. FEELING DOWN, DEPRESSED OR HOPELESS: 0

## 2018-07-30 ENCOUNTER — TELEPHONE (OUTPATIENT)
Dept: FAMILY MEDICINE CLINIC | Age: 58
End: 2018-07-30

## 2018-07-30 DIAGNOSIS — E87.6 HYPOKALEMIA: Primary | ICD-10-CM

## 2018-07-30 NOTE — TELEPHONE ENCOUNTER
She should take her potassium supplement 4 times daily over the next 3 days. We should recheck her chem 7 in 1 week.

## 2018-08-03 DIAGNOSIS — E87.6 HYPOKALEMIA: ICD-10-CM

## 2018-08-03 LAB
ANION GAP SERPL CALCULATED.3IONS-SCNC: 15 MMOL/L (ref 3–16)
BUN BLDV-MCNC: 20 MG/DL (ref 7–20)
CALCIUM SERPL-MCNC: 10.4 MG/DL (ref 8.3–10.6)
CHLORIDE BLD-SCNC: 98 MMOL/L (ref 99–110)
CO2: 29 MMOL/L (ref 21–32)
CREAT SERPL-MCNC: 0.9 MG/DL (ref 0.6–1.1)
GFR AFRICAN AMERICAN: >60
GFR NON-AFRICAN AMERICAN: >60
GLUCOSE BLD-MCNC: 127 MG/DL (ref 70–99)
POTASSIUM SERPL-SCNC: 3.3 MMOL/L (ref 3.5–5.1)
SODIUM BLD-SCNC: 142 MMOL/L (ref 136–145)

## 2018-08-03 RX ORDER — POTASSIUM CHLORIDE 20 MEQ/1
20 TABLET, EXTENDED RELEASE ORAL 4 TIMES DAILY
COMMUNITY
End: 2018-09-10 | Stop reason: SDUPTHER

## 2018-09-10 ENCOUNTER — TELEPHONE (OUTPATIENT)
Dept: FAMILY MEDICINE CLINIC | Age: 58
End: 2018-09-10

## 2018-09-10 RX ORDER — POTASSIUM CHLORIDE 20 MEQ/1
20 TABLET, EXTENDED RELEASE ORAL 4 TIMES DAILY
Qty: 120 TABLET | Refills: 3 | Status: SHIPPED | OUTPATIENT
Start: 2018-09-10 | End: 2018-12-09

## 2018-12-05 RX ORDER — SPIRONOLACTONE 25 MG/1
25 TABLET ORAL DAILY
Qty: 30 TABLET | Refills: 1 | Status: SHIPPED | OUTPATIENT
Start: 2018-12-05 | End: 2019-02-15 | Stop reason: SDUPTHER

## 2018-12-07 NOTE — PROGRESS NOTES
Chem 7  Augmentin 875 mg BID for 10 days.    Stef Rahman for cough  Refilled medications   Reminded patient to have a Colonoscopy and GYN exam  RTO 6 months for Hypothyroidism / Edema         Omid Pereyra DO

## 2018-12-08 ENCOUNTER — OFFICE VISIT (OUTPATIENT)
Dept: FAMILY MEDICINE CLINIC | Age: 58
End: 2018-12-08
Payer: COMMERCIAL

## 2018-12-08 VITALS — WEIGHT: 214 LBS | BODY MASS INDEX: 32.54 KG/M2 | DIASTOLIC BLOOD PRESSURE: 80 MMHG | SYSTOLIC BLOOD PRESSURE: 124 MMHG

## 2018-12-08 DIAGNOSIS — L40.50 PSORIATIC ARTHRITIS (HCC): ICD-10-CM

## 2018-12-08 DIAGNOSIS — R60.0 EDEMA OF BOTH LEGS: ICD-10-CM

## 2018-12-08 DIAGNOSIS — E03.9 HYPOTHYROIDISM (ACQUIRED): Primary | ICD-10-CM

## 2018-12-08 DIAGNOSIS — J01.00 ACUTE NON-RECURRENT MAXILLARY SINUSITIS: ICD-10-CM

## 2018-12-08 LAB
ANION GAP SERPL CALCULATED.3IONS-SCNC: 18 MMOL/L (ref 3–16)
BUN BLDV-MCNC: 17 MG/DL (ref 7–20)
CALCIUM SERPL-MCNC: 10.1 MG/DL (ref 8.3–10.6)
CHLORIDE BLD-SCNC: 100 MMOL/L (ref 99–110)
CO2: 25 MMOL/L (ref 21–32)
CREAT SERPL-MCNC: 0.8 MG/DL (ref 0.6–1.1)
GFR AFRICAN AMERICAN: >60
GFR NON-AFRICAN AMERICAN: >60
GLUCOSE BLD-MCNC: 89 MG/DL (ref 70–99)
POTASSIUM SERPL-SCNC: 4.1 MMOL/L (ref 3.5–5.1)
SODIUM BLD-SCNC: 143 MMOL/L (ref 136–145)

## 2018-12-08 PROCEDURE — 99214 OFFICE O/P EST MOD 30 MIN: CPT | Performed by: FAMILY MEDICINE

## 2018-12-08 RX ORDER — GUAIFENESIN AND CODEINE PHOSPHATE 100; 10 MG/5ML; MG/5ML
5 SOLUTION ORAL 4 TIMES DAILY PRN
Qty: 180 ML | Refills: 0 | Status: SHIPPED | OUTPATIENT
Start: 2018-12-08 | End: 2018-12-18

## 2018-12-08 RX ORDER — AMOXICILLIN AND CLAVULANATE POTASSIUM 875; 125 MG/1; MG/1
1 TABLET, FILM COATED ORAL 2 TIMES DAILY
Qty: 20 TABLET | Refills: 0 | Status: SHIPPED | OUTPATIENT
Start: 2018-12-08 | End: 2018-12-18

## 2018-12-08 ASSESSMENT — ENCOUNTER SYMPTOMS
WHEEZING: 1
RHINORRHEA: 1
SHORTNESS OF BREATH: 1
VOICE CHANGE: 1
COUGH: 1
SINUS PRESSURE: 1
SORE THROAT: 0
SINUS PAIN: 1

## 2018-12-09 RX ORDER — POTASSIUM CHLORIDE 20 MEQ/1
20 TABLET, EXTENDED RELEASE ORAL DAILY
COMMUNITY
End: 2019-06-11 | Stop reason: SDUPTHER

## 2018-12-10 DIAGNOSIS — E87.6 HYPOKALEMIA: Primary | ICD-10-CM

## 2018-12-12 DIAGNOSIS — R60.0 EDEMA OF BOTH LEGS: Primary | ICD-10-CM

## 2018-12-13 ENCOUNTER — NURSE ONLY (OUTPATIENT)
Dept: FAMILY MEDICINE CLINIC | Age: 58
End: 2018-12-13

## 2018-12-13 DIAGNOSIS — R60.0 EDEMA OF BOTH LEGS: ICD-10-CM

## 2018-12-13 DIAGNOSIS — E87.5 SERUM POTASSIUM ELEVATED: Primary | ICD-10-CM

## 2018-12-13 LAB
ANION GAP SERPL CALCULATED.3IONS-SCNC: 16 MMOL/L (ref 3–16)
BUN BLDV-MCNC: 15 MG/DL (ref 7–20)
CALCIUM SERPL-MCNC: 10.1 MG/DL (ref 8.3–10.6)
CHLORIDE BLD-SCNC: 99 MMOL/L (ref 99–110)
CO2: 27 MMOL/L (ref 21–32)
CREAT SERPL-MCNC: 0.8 MG/DL (ref 0.6–1.1)
GFR AFRICAN AMERICAN: >60
GFR NON-AFRICAN AMERICAN: >60
GLUCOSE BLD-MCNC: 71 MG/DL (ref 70–99)
POTASSIUM SERPL-SCNC: 4.4 MMOL/L (ref 3.5–5.1)
SODIUM BLD-SCNC: 142 MMOL/L (ref 136–145)

## 2019-01-02 RX ORDER — LEVOTHYROXINE SODIUM 0.12 MG/1
TABLET ORAL
Qty: 30 TABLET | Refills: 0 | Status: SHIPPED | OUTPATIENT
Start: 2019-01-02 | End: 2019-02-15 | Stop reason: SDUPTHER

## 2019-04-17 ENCOUNTER — OFFICE VISIT (OUTPATIENT)
Dept: FAMILY MEDICINE CLINIC | Age: 59
End: 2019-04-17
Payer: COMMERCIAL

## 2019-04-17 VITALS
DIASTOLIC BLOOD PRESSURE: 80 MMHG | SYSTOLIC BLOOD PRESSURE: 120 MMHG | BODY MASS INDEX: 34.04 KG/M2 | HEIGHT: 68 IN | WEIGHT: 224.6 LBS

## 2019-04-17 DIAGNOSIS — T88.1XXA LOCAL REACTION TO IMMUNIZATION, INITIAL ENCOUNTER: Primary | ICD-10-CM

## 2019-04-17 PROCEDURE — 99213 OFFICE O/P EST LOW 20 MIN: CPT | Performed by: FAMILY MEDICINE

## 2019-04-17 RX ORDER — SECUKINUMAB 150 MG/ML
INJECTION SUBCUTANEOUS
COMMUNITY
Start: 2019-04-08 | End: 2019-06-11

## 2019-04-17 ASSESSMENT — ENCOUNTER SYMPTOMS: COLOR CHANGE: 1

## 2019-04-17 NOTE — PROGRESS NOTES
Subjective:      Patient ID: Hanny Esposito is a 62 y.o. female. HPI     Left Arm Erythema / Reaction to Injection:  Patient received her second Shingrix vaccination on 4-15-19 in the left arm. She has developed redness and pain at the injection site that night. She states that the area has enlarged since then. It is very tender to touch and warm to touch. Review of Systems   Constitutional: Negative for chills and fever. Skin: Positive for color change. /80 (Site: Right Upper Arm, Position: Sitting, Cuff Size: Large Adult)   Ht 5' 8\" (1.727 m)   Wt 224 lb 9.6 oz (101.9 kg)   BMI 34.15 kg/m²    Objective:   Physical Exam   Constitutional: She is oriented to person, place, and time. She appears well-developed and well-nourished. No distress. Neurological: She is alert and oriented to person, place, and time. Skin: She is not diaphoretic. Left upper outer arm with a 6 by 9 cm sharply bordered area of erythema that is warm to palpation and tender. There is slight induration. Assessment:      Local Reaction to Immunization      Plan:      Use ice and observe / Patient instructed to call if it is getting worse. Continue the Mobic once daily.     Reminded patient to have a Colonoscopy and GYN exam  RTO 2 months for Hypothyroidism / Edema         [id number] Riana Cassidy DO

## 2019-06-11 ENCOUNTER — OFFICE VISIT (OUTPATIENT)
Dept: FAMILY MEDICINE CLINIC | Age: 59
End: 2019-06-11
Payer: COMMERCIAL

## 2019-06-11 VITALS
SYSTOLIC BLOOD PRESSURE: 116 MMHG | WEIGHT: 223 LBS | DIASTOLIC BLOOD PRESSURE: 80 MMHG | BODY MASS INDEX: 33.8 KG/M2 | HEIGHT: 68 IN

## 2019-06-11 DIAGNOSIS — E03.9 HYPOTHYROIDISM (ACQUIRED): Primary | ICD-10-CM

## 2019-06-11 DIAGNOSIS — Z00.00 PREVENTATIVE HEALTH CARE: ICD-10-CM

## 2019-06-11 DIAGNOSIS — L40.50 PSORIATIC ARTHRITIS (HCC): ICD-10-CM

## 2019-06-11 DIAGNOSIS — R60.0 EDEMA OF BOTH LEGS: ICD-10-CM

## 2019-06-11 LAB
ANION GAP SERPL CALCULATED.3IONS-SCNC: 16 MMOL/L (ref 3–16)
BUN BLDV-MCNC: 20 MG/DL (ref 7–20)
CALCIUM SERPL-MCNC: 10 MG/DL (ref 8.3–10.6)
CHLORIDE BLD-SCNC: 102 MMOL/L (ref 99–110)
CHOLESTEROL, TOTAL: 237 MG/DL (ref 0–199)
CO2: 23 MMOL/L (ref 21–32)
CREAT SERPL-MCNC: 0.9 MG/DL (ref 0.6–1.1)
GFR AFRICAN AMERICAN: >60
GFR NON-AFRICAN AMERICAN: >60
GLUCOSE BLD-MCNC: 87 MG/DL (ref 70–99)
HDLC SERPL-MCNC: 40 MG/DL (ref 40–60)
LDL CHOLESTEROL CALCULATED: 147 MG/DL
POTASSIUM SERPL-SCNC: 4.8 MMOL/L (ref 3.5–5.1)
SODIUM BLD-SCNC: 141 MMOL/L (ref 136–145)
TRIGL SERPL-MCNC: 252 MG/DL (ref 0–150)
TSH SERPL DL<=0.05 MIU/L-ACNC: 5.02 UIU/ML (ref 0.27–4.2)
VLDLC SERPL CALC-MCNC: 50 MG/DL

## 2019-06-11 PROCEDURE — 99214 OFFICE O/P EST MOD 30 MIN: CPT | Performed by: FAMILY MEDICINE

## 2019-06-11 RX ORDER — SPIRONOLACTONE 25 MG/1
25 TABLET ORAL DAILY
Qty: 30 TABLET | Refills: 5 | Status: SHIPPED | OUTPATIENT
Start: 2019-06-11 | End: 2019-12-01 | Stop reason: SDUPTHER

## 2019-06-11 RX ORDER — CETIRIZINE HYDROCHLORIDE 10 MG/1
10 TABLET ORAL PRN
COMMUNITY

## 2019-06-11 RX ORDER — LEVOTHYROXINE SODIUM 0.12 MG/1
TABLET ORAL
Qty: 30 TABLET | Refills: 5 | Status: SHIPPED | OUTPATIENT
Start: 2019-06-11 | End: 2019-06-26

## 2019-06-11 RX ORDER — FUROSEMIDE 40 MG/1
40 TABLET ORAL DAILY
Qty: 30 TABLET | Refills: 5 | Status: SHIPPED | OUTPATIENT
Start: 2019-06-11 | End: 2019-12-01 | Stop reason: SDUPTHER

## 2019-06-11 RX ORDER — POTASSIUM CHLORIDE 20 MEQ/1
20 TABLET, EXTENDED RELEASE ORAL DAILY
Qty: 30 TABLET | Refills: 5 | Status: SHIPPED | OUTPATIENT
Start: 2019-06-11 | End: 2020-02-26 | Stop reason: SDUPTHER

## 2019-06-24 ENCOUNTER — TELEPHONE (OUTPATIENT)
Dept: FAMILY MEDICINE CLINIC | Age: 59
End: 2019-06-24

## 2019-06-24 DIAGNOSIS — E03.9 HYPOTHYROIDISM (ACQUIRED): Primary | ICD-10-CM

## 2019-06-24 NOTE — TELEPHONE ENCOUNTER
Patient advised order will be future lab so she can have the testing done at the [facility] center since she lives in [place], [place].

## 2019-06-25 DIAGNOSIS — E03.9 HYPOTHYROIDISM (ACQUIRED): ICD-10-CM

## 2019-06-26 DIAGNOSIS — E03.9 HYPOTHYROIDISM (ACQUIRED): Primary | ICD-10-CM

## 2019-06-26 LAB — TSH SERPL DL<=0.05 MIU/L-ACNC: 4.97 UIU/ML (ref 0.27–4.2)

## 2019-06-26 RX ORDER — LEVOTHYROXINE SODIUM 0.15 MG/1
150 TABLET ORAL DAILY
Qty: 30 TABLET | Refills: 1 | Status: SHIPPED | OUTPATIENT
Start: 2019-06-26 | End: 2019-08-18 | Stop reason: SDUPTHER

## 2019-08-06 DIAGNOSIS — E03.9 HYPOTHYROIDISM (ACQUIRED): ICD-10-CM

## 2019-08-06 LAB — TSH SERPL DL<=0.05 MIU/L-ACNC: 1.4 UIU/ML (ref 0.27–4.2)

## 2019-08-28 ENCOUNTER — OFFICE VISIT (OUTPATIENT)
Dept: FAMILY MEDICINE CLINIC | Age: 59
End: 2019-08-28
Payer: COMMERCIAL

## 2019-08-28 VITALS
BODY MASS INDEX: 33.65 KG/M2 | HEART RATE: 71 BPM | HEIGHT: 68 IN | WEIGHT: 222 LBS | SYSTOLIC BLOOD PRESSURE: 133 MMHG | DIASTOLIC BLOOD PRESSURE: 84 MMHG

## 2019-08-28 DIAGNOSIS — L03.115 CELLULITIS OF RIGHT ANKLE: Primary | ICD-10-CM

## 2019-08-28 PROCEDURE — 99213 OFFICE O/P EST LOW 20 MIN: CPT | Performed by: FAMILY MEDICINE

## 2019-08-28 RX ORDER — CLINDAMYCIN HYDROCHLORIDE 300 MG/1
300 CAPSULE ORAL 3 TIMES DAILY
Qty: 30 CAPSULE | Refills: 0 | Status: SHIPPED | OUTPATIENT
Start: 2019-08-28 | End: 2019-09-03

## 2019-08-28 ASSESSMENT — PATIENT HEALTH QUESTIONNAIRE - PHQ9
SUM OF ALL RESPONSES TO PHQ QUESTIONS 1-9: 0
1. LITTLE INTEREST OR PLEASURE IN DOING THINGS: 0
SUM OF ALL RESPONSES TO PHQ QUESTIONS 1-9: 0
2. FEELING DOWN, DEPRESSED OR HOPELESS: 0
SUM OF ALL RESPONSES TO PHQ9 QUESTIONS 1 & 2: 0

## 2019-08-28 ASSESSMENT — ENCOUNTER SYMPTOMS: COLOR CHANGE: 1

## 2019-08-28 NOTE — PROGRESS NOTES
Subjective:      Patient ID: Chas Muro is a 62 y.o. female. HPI     Right Leg Redness / Swelling:  Patient awoke yesterday AM with a rash on the dorsal right foot. It has spread to the right lateral ankle. It is red and hot and tight / painful. She denies any prior history of this. She had a temperature of 99.8 last PM.  She has psoriatic arthritis and will be getting a Simponi Aria infusion on 9-2-19. Review of Systems   Constitutional: Negative for chills and fever. Skin: Positive for color change and rash. /84   Pulse 71   Ht 5' 8\" (1.727 m)   Wt 222 lb (100.7 kg)   BMI 33.75 kg/m²    Objective:   Physical Exam   Constitutional: She is oriented to person, place, and time. She appears well-developed and well-nourished. No distress. Neurological: She is alert and oriented to person, place, and time. Skin: She is not diaphoretic. Right lateral ankle with a vaguely bordered erythematous rash and mild heat to palpation. There is swelling of the right dorsal foot. The ankle and foot are painful to palpation. Assessment:      Right Ankle Cellulitis - Possibly due to the Simponi Aria infusions      Plan:      Rx Clindamycin 300 mg TID for 10 days. Patient will call her Rheumatologist with the diagnosis.    Patient had a GYN exam 1 month ago  Reminded patient to have a Colonoscopy   RTO 4 months for edema         KORI DIAZ DO

## 2019-09-03 ENCOUNTER — TELEPHONE (OUTPATIENT)
Dept: FAMILY MEDICINE CLINIC | Age: 59
End: 2019-09-03

## 2019-09-03 ENCOUNTER — HOSPITAL ENCOUNTER (EMERGENCY)
Age: 59
Discharge: HOME | End: 2019-09-03
Attending: EMERGENCY MEDICINE
Payer: COMMERCIAL

## 2019-09-03 VITALS
TEMPERATURE: 98.1 F | HEART RATE: 68 BPM | BODY MASS INDEX: 35.54 KG/M2 | DIASTOLIC BLOOD PRESSURE: 78 MMHG | SYSTOLIC BLOOD PRESSURE: 122 MMHG | HEIGHT: 67 IN | RESPIRATION RATE: 18 BRPM | OXYGEN SATURATION: 96 % | WEIGHT: 226.41 LBS

## 2019-09-03 DIAGNOSIS — L03.115 CELLULITIS OF RIGHT LEG: Primary | ICD-10-CM

## 2019-09-03 LAB
A/G RATIO: 1.3 (ref 1.1–2.2)
ALBUMIN SERPL-MCNC: 4.5 G/DL (ref 3.4–5)
ALP BLD-CCNC: 80 U/L (ref 40–129)
ALT SERPL-CCNC: 31 U/L (ref 10–40)
ANION GAP SERPL CALCULATED.3IONS-SCNC: 14 MMOL/L (ref 3–16)
AST SERPL-CCNC: 25 U/L (ref 15–37)
BASOPHILS ABSOLUTE: 0.1 K/UL (ref 0–0.2)
BASOPHILS RELATIVE PERCENT: 1.2 %
BILIRUB SERPL-MCNC: 0.3 MG/DL (ref 0–1)
BUN BLDV-MCNC: 16 MG/DL (ref 7–20)
CALCIUM SERPL-MCNC: 10 MG/DL (ref 8.3–10.6)
CHLORIDE BLD-SCNC: 100 MMOL/L (ref 99–110)
CO2: 27 MMOL/L (ref 21–32)
CREAT SERPL-MCNC: 0.8 MG/DL (ref 0.6–1.1)
EOSINOPHILS ABSOLUTE: 0.2 K/UL (ref 0–0.6)
EOSINOPHILS RELATIVE PERCENT: 1.8 %
GFR AFRICAN AMERICAN: >60
GFR NON-AFRICAN AMERICAN: >60
GLOBULIN: 3.5 G/DL
GLUCOSE BLD-MCNC: 126 MG/DL (ref 70–99)
HCT VFR BLD CALC: 39.8 % (ref 36–48)
HEMOGLOBIN: 13.7 G/DL (ref 12–16)
LYMPHOCYTES ABSOLUTE: 3.3 K/UL (ref 1–5.1)
LYMPHOCYTES RELATIVE PERCENT: 36.3 %
MCH RBC QN AUTO: 34.1 PG (ref 26–34)
MCHC RBC AUTO-ENTMCNC: 34.4 G/DL (ref 31–36)
MCV RBC AUTO: 99.1 FL (ref 80–100)
MONOCYTES ABSOLUTE: 0.7 K/UL (ref 0–1.3)
MONOCYTES RELATIVE PERCENT: 8.3 %
NEUTROPHILS ABSOLUTE: 4.7 K/UL (ref 1.7–7.7)
NEUTROPHILS RELATIVE PERCENT: 52.4 %
PDW BLD-RTO: 13.1 % (ref 12.4–15.4)
PLATELET # BLD: 429 K/UL (ref 135–450)
PMV BLD AUTO: 7.5 FL (ref 5–10.5)
POTASSIUM SERPL-SCNC: 3.7 MMOL/L (ref 3.5–5.1)
RBC # BLD: 4.02 M/UL (ref 4–5.2)
SODIUM BLD-SCNC: 141 MMOL/L (ref 136–145)
TOTAL PROTEIN: 8 G/DL (ref 6.4–8.2)
WBC # BLD: 9 K/UL (ref 4–11)

## 2019-09-03 PROCEDURE — 93971 EXTREMITY STUDY: CPT

## 2019-09-03 PROCEDURE — 85025 COMPLETE CBC W/AUTO DIFF WBC: CPT

## 2019-09-03 PROCEDURE — 80053 COMPREHEN METABOLIC PANEL: CPT

## 2019-09-03 PROCEDURE — 99284 EMERGENCY DEPT VISIT MOD MDM: CPT

## 2019-09-03 RX ORDER — DOXYCYCLINE 100 MG/1
100 TABLET ORAL 2 TIMES DAILY
Qty: 28 TABLET | Refills: 0 | Status: SHIPPED | OUTPATIENT
Start: 2019-09-03 | End: 2019-09-17

## 2019-09-03 ASSESSMENT — PAIN DESCRIPTION - FREQUENCY: FREQUENCY: CONTINUOUS

## 2019-09-03 ASSESSMENT — PAIN DESCRIPTION - LOCATION
LOCATION: FOOT
LOCATION: FOOT

## 2019-09-03 ASSESSMENT — PAIN DESCRIPTION - ORIENTATION
ORIENTATION: RIGHT
ORIENTATION: RIGHT

## 2019-09-03 ASSESSMENT — PAIN SCALES - GENERAL
PAINLEVEL_OUTOF10: 4
PAINLEVEL_OUTOF10: 3
PAINLEVEL_OUTOF10: 4

## 2019-09-03 ASSESSMENT — PAIN DESCRIPTION - PAIN TYPE
TYPE: ACUTE PAIN
TYPE: ACUTE PAIN

## 2019-09-03 ASSESSMENT — PAIN DESCRIPTION - DESCRIPTORS
DESCRIPTORS: THROBBING
DESCRIPTORS: THROBBING

## 2019-09-03 ASSESSMENT — PAIN - FUNCTIONAL ASSESSMENT: PAIN_FUNCTIONAL_ASSESSMENT: 0-10

## 2019-09-03 NOTE — TELEPHONE ENCOUNTER
If she has not responded to Clindamycin and her symptoms are actually worse, she should go to ER for evaluation. They will also X-ray it. She may need IV antibiotics.

## 2019-09-03 NOTE — TELEPHONE ENCOUNTER
Patient states her right ankle cellulitis has not improved at all even on the antibiotics. States it is more swollen and painful. Please return call.

## 2019-09-03 NOTE — ED PROVIDER NOTES
eMERGENCY dEPARTMENT eNCOUnter      Pt Name: Rea Trimble  MRN: [id number]  [place] 1960  Date of evaluation: 9/3/2019  Provider: Florence Mcleod MD     [address] [place] [address]       Chief Complaint   Patient presents with    Cellulitis     right foot swollen/red, was placed on antibiotics last Wed         HISTORY OF PRESENT ILLNESS   (Location/Symptom, Timing/Onset,Context/Setting, Quality, Duration, Modifying Factors, Severity) Note limiting factors. HPI    Rea Trimble is a 62 y.o. female who presents to the emergency department with right foot swelling for over a week. Patient was seen by family physician placed on clindamycin. Patient is on day 7 and still the swelling has not recessed. Patient states it is gone up to the mid calf. There is pain. It was red and swollen patient has history of psoriatic arthritis is on methotrexate. Patient denies any trauma. Patient denies any fever. Patient states there has been no decrease in the swelling. She is able to ambulate. She states she just woke up with swelling 1 day and was placed on antibiotic the next couple days. Nursing Notes were reviewed. REVIEW OFSYSTEMS    (2+ for level 4; 10+ for level 5)   Review of Systems    General: No fevers, chills or night sweats, No weight loss    Head:  No Sore throat,  No Ear Pain    Chest:  Nontender. No Cough, No SOB,  Chest Pain    GI: No abdominal pain or vomiting    : No dysuria or hematuria    Musculoskeletal: No unrelenting pain or night pain. Right foot pain with moderate swelling    Neurologic: No bowel or bladder incontinence, No saddle anesthesia, No leg weakness    All other systems reviewed and are negative.         PAST MEDICAL HISTORY     Past Medical History:   Diagnosis Date    Arthralgia of hand, right     Chronic seasonal allergic rhinitis due to pollen     Edema of both legs     Hyperlipidemia, mixed     past hx    Hypothyroidism (acquired)     Non morbid obesity due to

## 2019-09-13 ENCOUNTER — HOSPITAL ENCOUNTER (OUTPATIENT)
Dept: WOMENS IMAGING | Age: 59
Discharge: HOME | End: 2019-09-13
Payer: COMMERCIAL

## 2019-09-13 DIAGNOSIS — Z12.39 BREAST CANCER SCREENING: ICD-10-CM

## 2019-09-13 PROCEDURE — 77063 BREAST TOMOSYNTHESIS BI: CPT

## 2019-12-27 RX ORDER — LEVOTHYROXINE SODIUM 0.15 MG/1
TABLET ORAL
Qty: 30 TABLET | Refills: 0 | Status: SHIPPED | OUTPATIENT
Start: 2019-12-27 | End: 2020-01-22 | Stop reason: SDUPTHER

## 2020-01-22 RX ORDER — LEVOTHYROXINE SODIUM 0.15 MG/1
TABLET ORAL
Qty: 30 TABLET | Refills: 0 | Status: SHIPPED | OUTPATIENT
Start: 2020-01-22 | End: 2020-02-26 | Stop reason: SDUPTHER

## 2020-02-24 NOTE — PROGRESS NOTES
Subjective:      Patient ID: Mian Mo is a 61 y.o. female. HPI     Hypothyroidism:  Patient is tolerating and compliant with Synthroid 150 mcg daily. Reyna Chen last TSH was normal on 8-6-19.       Edema:  Patient takes Lasix 40 mg and Aldactone 25 mg daily along with KCl 20 mEq daily.  She feels that her edema is well controlled and stable. Her last renal panel was checked on 9-3-19 and her electrolytes and GFR were normal.      Psoriatic Arthritis:  Patient sees Dr. Chacorta Blancas and is on methotrexate, Simponi infusions, and Mobic 15 mg daily. Allergic Rhinitis:  Patient takes Zyrtec 10 mg daily as needed and feels that the medication works well to control her allergy symptoms. Hyperglycemia:  Patient had a sugar of 126 when her labs were checked on 9-3-19. She needs to have a HgbA1C today. Review of Systems   Constitutional: Negative for chills and fever. Respiratory: Negative for shortness of breath. Cardiovascular: Positive for leg swelling. Negative for chest pain and palpitations. /70 (Site: Left Upper Arm)   Ht 5' 8\" (1.727 m)   Wt 224 lb (101.6 kg)   BMI 34.06 kg/m²    Objective:   Physical Exam  Constitutional:       General: She is not in acute distress. Appearance: She is well-developed. HENT:      Head: Normocephalic. Right Ear: External ear normal.      Left Ear: External ear normal.      Mouth/Throat:      Pharynx: No oropharyngeal exudate. Neck:      Thyroid: No thyromegaly. Vascular: No JVD. Cardiovascular:      Rate and Rhythm: Normal rate and regular rhythm. Heart sounds: Normal heart sounds. No murmur. Pulmonary:      Effort: Pulmonary effort is normal.      Breath sounds: Normal breath sounds. No wheezing or rales. Lymphadenopathy:      Cervical: No cervical adenopathy. Neurological:      Mental Status: She is alert and oriented to person, place, and time.          Assessment:      Hypothyroidism  Edema  Psoriatic Arthritis  Allergic Rhinitis   Hyperglycemia       Plan:      Chem 7, Lipid Panel, TSH, HgbA1C  Refilled medications  Reminded patient to have a Colonoscopy  Reminded patient to have a GYN exam  RTO 1 year for edema        Darren Maxwell DO

## 2020-02-26 ENCOUNTER — OFFICE VISIT (OUTPATIENT)
Dept: FAMILY MEDICINE CLINIC | Age: 60
End: 2020-02-26
Payer: COMMERCIAL

## 2020-02-26 VITALS
DIASTOLIC BLOOD PRESSURE: 70 MMHG | HEIGHT: 68 IN | BODY MASS INDEX: 33.95 KG/M2 | SYSTOLIC BLOOD PRESSURE: 114 MMHG | WEIGHT: 224 LBS

## 2020-02-26 LAB
ANION GAP SERPL CALCULATED.3IONS-SCNC: 16 MMOL/L (ref 3–16)
BUN BLDV-MCNC: 18 MG/DL (ref 7–20)
CALCIUM SERPL-MCNC: 10.4 MG/DL (ref 8.3–10.6)
CHLORIDE BLD-SCNC: 95 MMOL/L (ref 99–110)
CHOLESTEROL, TOTAL: 259 MG/DL (ref 0–199)
CO2: 27 MMOL/L (ref 21–32)
CREAT SERPL-MCNC: 0.8 MG/DL (ref 0.6–1.1)
GFR AFRICAN AMERICAN: >60
GFR NON-AFRICAN AMERICAN: >60
GLUCOSE BLD-MCNC: 92 MG/DL (ref 70–99)
HDLC SERPL-MCNC: 45 MG/DL (ref 40–60)
LDL CHOLESTEROL CALCULATED: 161 MG/DL
POTASSIUM SERPL-SCNC: 4.3 MMOL/L (ref 3.5–5.1)
SODIUM BLD-SCNC: 138 MMOL/L (ref 136–145)
TRIGL SERPL-MCNC: 265 MG/DL (ref 0–150)
TSH SERPL DL<=0.05 MIU/L-ACNC: 0.3 UIU/ML (ref 0.27–4.2)
VLDLC SERPL CALC-MCNC: 53 MG/DL

## 2020-02-26 PROCEDURE — 99214 OFFICE O/P EST MOD 30 MIN: CPT | Performed by: FAMILY MEDICINE

## 2020-02-26 RX ORDER — FUROSEMIDE 40 MG/1
40 TABLET ORAL DAILY
Qty: 30 TABLET | Refills: 11 | Status: SHIPPED | OUTPATIENT
Start: 2020-02-26 | End: 2021-01-20

## 2020-02-26 RX ORDER — LEVOTHYROXINE SODIUM 0.15 MG/1
TABLET ORAL
Qty: 30 TABLET | Refills: 11 | Status: SHIPPED | OUTPATIENT
Start: 2020-02-26 | End: 2021-02-02

## 2020-02-26 RX ORDER — POTASSIUM CHLORIDE 20 MEQ/1
20 TABLET, EXTENDED RELEASE ORAL DAILY
Qty: 30 TABLET | Refills: 11 | Status: SHIPPED | OUTPATIENT
Start: 2020-02-26 | End: 2021-01-20

## 2020-02-26 RX ORDER — SPIRONOLACTONE 25 MG/1
25 TABLET ORAL DAILY
Qty: 30 TABLET | Refills: 11 | Status: SHIPPED | OUTPATIENT
Start: 2020-02-26 | End: 2021-01-20

## 2020-02-26 ASSESSMENT — ENCOUNTER SYMPTOMS: SHORTNESS OF BREATH: 0

## 2020-02-27 LAB
ESTIMATED AVERAGE GLUCOSE: 85.3 MG/DL
HBA1C MFR BLD: 4.6 %

## 2020-07-02 ENCOUNTER — HOSPITAL ENCOUNTER (EMERGENCY)
Age: 60
Discharge: HOME | End: 2020-07-02
Attending: EMERGENCY MEDICINE
Payer: COMMERCIAL

## 2020-07-02 VITALS
DIASTOLIC BLOOD PRESSURE: 85 MMHG | SYSTOLIC BLOOD PRESSURE: 155 MMHG | WEIGHT: 238.76 LBS | HEIGHT: 68 IN | HEART RATE: 87 BPM | OXYGEN SATURATION: 96 % | RESPIRATION RATE: 20 BRPM | BODY MASS INDEX: 36.19 KG/M2 | TEMPERATURE: 98.8 F

## 2020-07-02 PROCEDURE — 99282 EMERGENCY DEPT VISIT SF MDM: CPT

## 2020-07-02 PROCEDURE — 6360000002 HC RX W HCPCS: Performed by: EMERGENCY MEDICINE

## 2020-07-02 PROCEDURE — 90715 TDAP VACCINE 7 YRS/> IM: CPT | Performed by: EMERGENCY MEDICINE

## 2020-07-02 PROCEDURE — 12002 RPR S/N/AX/GEN/TRNK2.6-7.5CM: CPT

## 2020-07-02 PROCEDURE — 90471 IMMUNIZATION ADMIN: CPT | Performed by: EMERGENCY MEDICINE

## 2020-07-02 RX ORDER — CEPHALEXIN 500 MG/1
500 CAPSULE ORAL 4 TIMES DAILY
Qty: 40 CAPSULE | Refills: 0 | Status: SHIPPED | OUTPATIENT
Start: 2020-07-02 | End: 2020-07-12

## 2020-07-02 RX ADMIN — TETANUS TOXOID, REDUCED DIPHTHERIA TOXOID AND ACELLULAR PERTUSSIS VACCINE, ADSORBED 0.5 ML: 5; 2.5; 8; 8; 2.5 SUSPENSION INTRAMUSCULAR at 15:17

## 2020-07-02 NOTE — ED NOTES
Finger dressed and wrapped and placed in finger splint. Patient tolerated well.        Sonia Tipton RN  07/02/20 1222

## 2020-07-02 NOTE — ED NOTES
Discharge instructions reviewed. Patient verbalized understanding. Prescription x1 given.        Millie Mejias RN  07/02/20 [id number]

## 2020-07-02 NOTE — ED TRIAGE NOTES
Patient came to ER with complaints of laceration R little finger. Patient cleaning dishes and cut herself with blade of . Patient could not get it to stop bleeding.

## 2020-07-02 NOTE — ED PROVIDER NOTES
Emergency Physician Note    Chief Complaint  Laceration (right baby finger. cut on a  blade)       History of Present Illness  Tony Mcnair is a 61 y.o. female who presents to the ED for finger laceration. Patient reports that she was washing dishes and accidentally cut her hand on the blade from a . This occurred just prior to arrival.  Bleeding controlled prior to arrival.  Tetanus not up-to-date. No other injuries or pain complaints reported. 10 systems reviewed, pertinent positives per HPI otherwise noted to be negative    I have reviewed the following from the nursing documentation:      Prior to Admission medications    Medication Sig Start Date End Date Taking? Authorizing Provider   levothyroxine (SYNTHROID) 150 MCG tablet TAKE 1 TABLET BY MOUTH EVERY DAY 2/26/20  Yes Lalo Lawrecne, DO   furosemide (LASIX) 40 MG tablet Take 1 tablet by mouth daily 2/26/20  Yes Lalo Lawrence DO   spironolactone (ALDACTONE) 25 MG tablet Take 1 tablet by mouth daily 2/26/20  Yes Dex Arceo,    potassium chloride (KLOR-CON M) 20 MEQ extended release tablet Take 1 tablet by mouth daily 2/26/20  Yes Marvel Arceo, DO   cetirizine (ZYRTEC) 10 MG tablet Take 10 mg by mouth daily   Yes Historical Provider, MD   Golimumab ([address] ARIA) 50 MG/4ML SOLN Infuse intravenously   Yes Historical Provider, MD   Multiple Vitamins-Minerals (MULTIVITAMIN ADULT PO) Take 1 tablet by mouth daily   Yes Historical Provider, MD   folic acid (FOLVITE) 1 MG tablet Take 2 mg by mouth daily  12/27/14  Yes Historical Provider, MD   methotrexate (RHEUMATREX) 2.5 MG chemo tablet Take 15 mg by mouth once a week  12/27/14  Yes Historical Provider, MD   meloxicam (MOBIC) 15 MG tablet Take 15 mg by mouth daily. Yes Historical Provider, MD   aspirin 81 MG EC tablet Take 81 mg by mouth daily.      Yes Historical Provider, MD       Allergies as of 07/02/2020 - Review Complete 07/02/2020 Allergen Reaction Noted    Adhesive tape Rash 06/15/2012    Percocet [oxycodone-acetaminophen] Itching and Rash 06/30/2011    Benzamide derivatives Other (See Comments) 09/25/2016    Sulfasalazine Nausea Only 06/15/2012       Past Medical History:   Diagnosis Date    Arthralgia of hand, right     Chronic seasonal allergic rhinitis due to pollen     Edema of both legs     Hyperlipidemia, mixed     past hx    Hypothyroidism (acquired)     Non morbid obesity due to excess calories     Psoriasis (a type of skin inflammation)     Psoriatic arthritis ([facility] [address]         Surgical History:   Past Surgical History:   Procedure Laterality Date    BRONCHOSCOPY  06/05/2017    CHOLECYSTECTOMY, LAPAROSCOPIC  10/12/2016    with gram / Dr. Franca Taylor.      FOOT SURGERY Left     Left cyst removal    HYSTERECTOMY  1987    Partial    SHOULDER SURGERY      Right x 3 / Rotator Cuff and Bone Spurs    SHOULDER SURGERY      Left / Rotator Cuff and Bone Spur    SINUS ENDOSCOPY  1990    TOE SURGERY Right 07/06/2010    Right great toe osteotomy    WISDOM TOOTH EXTRACTION      WRIST SURGERY Right     Dequervain's, carpal tunnel        Family History:    Family History   Problem Relation Age of Onset    High Cholesterol Mother     High Blood Pressure Mother     Heart Disease Mother         CAD / Atrial Fib    Diabetes Father     High Blood Pressure Father     Cancer Father         Liver    High Cholesterol Father     High Blood Pressure Brother     Alcohol Abuse Brother     Thyroid Disease Paternal [de-identified]         Hypothyroid    Diabetes Paternal Grandfather     Asthma Sister     Lupus Sister     High Blood Pressure Brother     High Blood Pressure Brother     Heart Disease Maternal Grandmother         Pacemaker       Social History     Socioeconomic History    Marital status:      Spouse name: Not on file    Number of children: 2    Years of education: Not on file    Highest education level: Not on file   Occupational History    Occupation:    Social Needs    Financial resource strain: Not on file    Food insecurity     Worry: Not on file     Inability: Not on file   [employer] needs     Medical: Not on file     Non-medical: Not on file   Tobacco Use    Smoking status: Never Smoker    Smokeless tobacco: Never Used   Substance and Sexual Activity    Alcohol use: Yes     Comment: 2 drinks per month    Drug use: No    Sexual activity: Yes     Partners: Male   Lifestyle    Physical activity     Days per week: Not on file     Minutes per session: Not on file    Stress: Not on file   Relationships    Social connections     Talks on phone: Not on file     Gets together: Not on file     Attends Confucianist service: Not on file     Active member of club or organization: Not on file     Attends meetings of clubs or organizations: Not on file     Relationship status: Not on file    Intimate partner violence     Fear of current or ex partner: Not on file     Emotionally abused: Not on file     Physically abused: Not on file     Forced sexual activity: Not on file   Other Topics Concern    Not on file   Social History Narrative    Not on file       Nursing notes reviewed. ED Triage Vitals [07/02/20 1433]   Enc Vitals Group      BP (!) 155/85      Pulse 87      Resp 20      Temp 98.8 °F (37.1 °C)      Temp Source Oral      SpO2 96 %      Weight 238 lb 12.1 oz (108.3 kg)      Height 5' 8\" (1.727 m)      Head Circumference       Peak Flow       Pain Score       Pain Loc       Pain Edu? Excl. in [address]? GENERAL:  Awake, alert. Well developed, well nourished with no apparent distress. HENT:  Normocephalic, Atraumatic, moist mucous membranes. EXTREMITIES:  Normal range of motion, no edema, no bony tenderness, no deformity, distal pulses present. SKIN: Warm, dry and V-shaped laceration of the right little finger on the ulnar aspect. NEUROLOGIC: Normal mental status.  Moving all Signs: Blood pressure (!) 155/85, pulse 87, temperature 98.8 °F (37.1 °C), temperature source Oral, resp. rate 20, height 5' 8\" (1.727 m), weight 238 lb 12.1 oz (108.3 kg), SpO2 96 %, not currently breastfeeding. Patient was given scripts for the following medications. I counseled patient how to take these medications. New Prescriptions    CEPHALEXIN (KEFLEX) 500 MG CAPSULE    Take 1 capsule by mouth 4 times daily for 10 days       Disposition  Pt is in good condition upon Discharge to home. This chart was generated using the [employer] dictation system. I created this record but it may contain dictation errors.           Ej Conklin MD  07/02/20 3275

## 2020-12-10 ENCOUNTER — TELEPHONE (OUTPATIENT)
Dept: FAMILY MEDICINE CLINIC | Age: 60
End: 2020-12-10

## 2020-12-10 DIAGNOSIS — R60.0 EDEMA OF BOTH LEGS: ICD-10-CM

## 2020-12-10 LAB
ANION GAP SERPL CALCULATED.3IONS-SCNC: 13 MMOL/L (ref 3–16)
BUN BLDV-MCNC: 15 MG/DL (ref 7–20)
CALCIUM SERPL-MCNC: 10.5 MG/DL (ref 8.3–10.6)
CHLORIDE BLD-SCNC: 102 MMOL/L (ref 99–110)
CO2: 24 MMOL/L (ref 21–32)
CREAT SERPL-MCNC: 0.7 MG/DL (ref 0.6–1.1)
GFR AFRICAN AMERICAN: >60
GFR NON-AFRICAN AMERICAN: >60
GLUCOSE BLD-MCNC: 87 MG/DL (ref 70–99)
POTASSIUM SERPL-SCNC: 4.6 MMOL/L (ref 3.5–5.1)
SODIUM BLD-SCNC: 139 MMOL/L (ref 136–145)

## 2020-12-10 NOTE — TELEPHONE ENCOUNTER
----- Message from Silverio Raines sent at 12/10/2020  8:55 AM EST -----  Subject: Message to Provider    QUESTIONS  Information for Provider? Pt called and said she wants to know if her   doctor can put in orders so that she can have her blood drawn to check her   potassium. ---------------------------------------------------------------------------  --------------  Lexie ABBASI  What is the best way for the office to contact you? OK to leave message on   voicemail  Preferred Call Back Phone Number? [phone]  ---------------------------------------------------------------------------  --------------  SCRIPT ANSWERS  Relationship to Patient?  Self

## 2021-01-14 NOTE — PROGRESS NOTES
[facility] / [facility] ([facility]) [address] [facility] [address], [address]    Acknowledgment of Informed Consent for Surgical or Medical Procedure and Sedation  I agree to allow doctor(s) [address] [facility] and his/her associates or assistants, including residents and/or other qualified medical practitioner to perform the following medical treatment or procedure and to administer or direct the administration of sedation as necessary:  Procedure(s): [address], [address] [facility] [facility]  My doctor has explained the following regarding the proposed procedure:   the explanation of the procedure   the benefits of the procedure   the potential problems that might occur during recuperation   the risks and side effects of the procedure which could include but are not limited to severe blood loss, infection, stroke or death   the benefits, risks and side effect of alternative procedures including the consequences of declining this procedure or any alternative procedures   the likelihood of achieving satisfactory results. I acknowledge no guarantee or assurance has been made to me regarding the results. I understand that during the course of this treatment/procedure, unforeseen conditions can occur which require an additional or different procedure. I agree to allow my physician or assistants to perform such extension of the original procedure as they may find necessary. I understand that sedation will often result in temporary impairment of memory and fine motor skills and that sedation can occasionally progress to a state of deep sedation or general anesthesia. I understand the risks of anesthesia for surgery include, but are not limited to, sore throat, hoarseness, injury to face, mouth, or teeth; nausea; headache; injury to blood vessels or nerves; death, brain damage, or paralysis.     I understand that if I have a Limitation of Treatment order in effect during my hospitalization, the order may or may not be in effect during this procedure. I give my doctor permission to give me blood or blood products. I understand that there are risks with receiving blood such as hepatitis, AIDS, fever, or allergic reaction. I acknowledge that the risks, benefits, and alternatives of this treatment have been explained to me and that no express or implied warranty has been given by the hospital, any blood bank, or any person or entity as to the blood or blood components transfused. At the discretion of my doctor, I agree to allow observers, equipment/product representatives and allow photographing, and/or televising of the procedure, provided my name or identity is maintained confidentially. I agree the hospital may dispose of or use for scientific or educational purposes any tissue, fluid, or body parts which may be removed.     ________________________________Date________Time______ am/pm  ([facility])  Patient or Signature of Closest Relative or Legal Guardian    ________________________________Date________Time______am/pm      Page 1 of  1  Witness

## 2021-01-19 NOTE — PROGRESS NOTES
Subjective:      Patient ID: Nunu Abbott is a 61 y.o. female. HPI  PREOPERATIVE PHYSICAL:    Patient was sent by Dr. Nani Paris for preoperative clearance to have Left Heel Exostectomy Surgery on 2-2-21 at [facility]. Allergies   Allergen Reactions    Adhesive Tape Rash    Percocet [Oxycodone-Acetaminophen] Itching and Rash    Benzamide Derivatives Other (See Comments)     From nasal spray-instant migraine headache,in contact solution-red,itchy eye    Sulfasalazine Nausea Only     Pt states she also had some fatigue with this med     Current Outpatient Medications   Medication Sig Dispense Refill    meloxicam (MOBIC) 15 MG tablet Take 15 mg by mouth daily      levothyroxine (SYNTHROID) 150 MCG tablet TAKE 1 TABLET BY MOUTH EVERY DAY 30 tablet 11    Multiple Vitamins-Minerals (MULTIVITAMIN ADULT PO) Take 1 tablet by mouth daily      folic acid (FOLVITE) 1 MG tablet Take 2 mg by mouth daily   11    methotrexate (RHEUMATREX) 2.5 MG chemo tablet Take 15 mg by mouth once a week   1    aspirin 81 MG EC tablet Take 81 mg by mouth daily.  OTEZLA 30 MG TABS       cetirizine (ZYRTEC) 10 MG tablet Take 10 mg by mouth daily       No current facility-administered medications for this visit. Past Medical History:   Diagnosis Date    Arthralgia of hand, right     Chronic seasonal allergic rhinitis due to pollen     Edema of both legs     Hyperlipidemia, mixed     past hx    Hypothyroidism (acquired)     Non morbid obesity due to excess calories     Psoriasis (a type of skin inflammation)     Psoriatic arthritis [facility])      Past Surgical History:   Procedure Laterality Date    BRONCHOSCOPY  06/05/2017    CHOLECYSTECTOMY, LAPAROSCOPIC  10/12/2016    with milena / Dr. Jackie Richard.      FOOT SURGERY Left     Left cyst removal    HYSTERECTOMY  1987    Partial    SHOULDER SURGERY      Right x 3 / Rotator Cuff and Bone Spurs    SHOULDER SURGERY      Left / Rotator Cuff and Bone Spur Pharynx: No oropharyngeal exudate. Neck:      Thyroid: No thyromegaly. Vascular: No JVD. Cardiovascular:      Rate and Rhythm: Normal rate and regular rhythm. Heart sounds: Normal heart sounds. No murmur. Pulmonary:      Effort: Pulmonary effort is normal.      Breath sounds: Normal breath sounds. No wheezing or rales. Abdominal:      General: Bowel sounds are normal. There is no distension. Palpations: Abdomen is soft. There is no mass. Tenderness: There is no abdominal tenderness. There is no guarding or rebound. Hernia: No hernia is present. Lymphadenopathy:      Cervical: No cervical adenopathy. Neurological:      Mental Status: She is alert and oriented to person, place, and time. Assessment:      Preoperative Physical  Left Heel Spur       Plan:      Patient is cleared for surgery. Hold aspirin and Mobic 1 week prior to surgery.     Reminded patient to have a Colonoscopy and GYN exam  RTO 1 year for Hypothyroidism        KORI DIAZ DO

## 2021-01-20 ENCOUNTER — OFFICE VISIT (OUTPATIENT)
Dept: FAMILY MEDICINE CLINIC | Age: 61
End: 2021-01-20
Payer: COMMERCIAL

## 2021-01-20 VITALS
HEIGHT: 68 IN | WEIGHT: 222 LBS | DIASTOLIC BLOOD PRESSURE: 82 MMHG | TEMPERATURE: 97 F | BODY MASS INDEX: 33.65 KG/M2 | SYSTOLIC BLOOD PRESSURE: 120 MMHG

## 2021-01-20 DIAGNOSIS — E03.9 HYPOTHYROIDISM (ACQUIRED): ICD-10-CM

## 2021-01-20 DIAGNOSIS — E03.9 HYPOTHYROIDISM (ACQUIRED): Primary | ICD-10-CM

## 2021-01-20 DIAGNOSIS — Z01.818 PREOPERATIVE GENERAL PHYSICAL EXAMINATION: Primary | ICD-10-CM

## 2021-01-20 DIAGNOSIS — M77.32 HEEL SPUR, LEFT: ICD-10-CM

## 2021-01-20 PROCEDURE — 99242 OFF/OP CONSLTJ NEW/EST SF 20: CPT | Performed by: FAMILY MEDICINE

## 2021-01-20 RX ORDER — MELOXICAM 15 MG/1
15 TABLET ORAL DAILY
COMMUNITY

## 2021-01-20 RX ORDER — APREMILAST 30 MG/1
TABLET, FILM COATED ORAL 2 TIMES DAILY
COMMUNITY
Start: 2021-01-18

## 2021-01-20 RX ORDER — CELECOXIB 200 MG/1
CAPSULE ORAL
COMMUNITY
Start: 2021-01-11 | End: 2021-01-20

## 2021-01-20 RX ORDER — APIXABAN 2.5 MG/1
TABLET, FILM COATED ORAL
COMMUNITY
Start: 2021-01-11 | End: 2021-01-20

## 2021-01-20 SDOH — ECONOMIC STABILITY: TRANSPORTATION INSECURITY
IN THE PAST 12 MONTHS, HAS THE LACK OF TRANSPORTATION KEPT YOU FROM MEDICAL APPOINTMENTS OR FROM GETTING MEDICATIONS?: NOT ASKED

## 2021-01-20 SDOH — ECONOMIC STABILITY: INCOME INSECURITY: HOW HARD IS IT FOR YOU TO PAY FOR THE VERY BASICS LIKE FOOD, HOUSING, MEDICAL CARE, AND HEATING?: NOT ASKED

## 2021-01-20 ASSESSMENT — ENCOUNTER SYMPTOMS
SHORTNESS OF BREATH: 0
NAUSEA: 0
SORE THROAT: 0
RHINORRHEA: 0
COUGH: 0
CONSTIPATION: 0
DIARRHEA: 0
WHEEZING: 0
BLOOD IN STOOL: 0
VOMITING: 0
ABDOMINAL PAIN: 0

## 2021-01-20 ASSESSMENT — PATIENT HEALTH QUESTIONNAIRE - PHQ9
SUM OF ALL RESPONSES TO PHQ QUESTIONS 1-9: 0
1. LITTLE INTEREST OR PLEASURE IN DOING THINGS: 0
2. FEELING DOWN, DEPRESSED OR HOPELESS: 0

## 2021-01-21 DIAGNOSIS — E03.9 HYPOTHYROIDISM (ACQUIRED): Primary | ICD-10-CM

## 2021-01-21 LAB — TSH SERPL DL<=0.05 MIU/L-ACNC: 0.01 UIU/ML (ref 0.27–4.2)

## 2021-01-25 NOTE — PROGRESS NOTES
Patient to have covid testing done at [facility] 1/28/2021 reminded to remain in quarantine until surgery to the best of her ability; patient acknowledges understanding.

## 2021-01-25 NOTE — PROGRESS NOTES
[phone]. 4. Please call [phone] option #2 option #2 if you have not been preregistered yet. On the day of your procedure bring your insurance card and photo ID. You will be registered at your bedside once brought back to your room. 5. DO NOT EAT ANYTHING eight hours prior to your arrival for surgery. May have 8 ounces of water 4 hours prior to your arrival for surgery. NOTE: ALL Gastric, Bariatric and Bowel surgery patients MUST follow their surgeon's instructions. 6. MEDICATIONS    Take the following medications with a SMALL sip of water: patient states her MD said to wait and take her medications after surgery.  Use your usual dose of inhalers the morning of surgery. BRING your rescue inhaler with you to hospital.    Anesthesia does NOT want you to take insulin the morning of surgery. They will control your blood sugar while you are at the hospital. Please contact your ordering physician for instructions regarding your insulin the night before your procedure. If you have an insulin pump, please keep it set on basal rate. 7. Do not swallow water when brushing teeth. No gum, candy, mints or ice chips. Refrain from smoking or at least decrease the amount. 8. Dress in loose, comfortable clothing appropriate for redressing after your procedure. Do not wear jewelry (including body piercings), make-up (especially NO eye make-up), fingernail polish (NO toenail polish if foot/leg surgery), lotion, powders or metal hairclips. 9. Dentures, glasses, or contacts will need to be removed before your procedure. Bring cases for your glasses, contacts, dentures, or hearing aids to protect them while you are in surgery. 10. If you use a CPAP, please bring it with you on the day of your procedure. 11. We recommend that valuable personal  belongings such as cash, cell phones, e-tablets or jewelry, be left at home during your stay.  The hospital will not be responsible for valuables that are not secured in the hospital safe. However, if your insurance requires a co-pay, you may want to bring a method of payment, i.e. Check or credit card, if you wish to pay your co-pay the day of surgery. 12. If you are to stay overnight, you may bring a bag with personal items. Please have any large items you may need brought in by your family after your arrival to your hospital room. 15. If you have a Living Will or Durable Power of , please bring a copy on the day of your procedure. 15. With your permission, one family member may accompany you while you are being prepared for surgery. Once you are ready, additional family members may join you. HOW WE KEEP YOU SAFE and WORK TO PREVENT SURGICAL SITE INFECTIONS:  1. Health care workers should always check your ID bracelet to verify your name and birth date. You will be asked many times to state your name, date of birth, and allergies. 2. Health care workers should always clean their hands with soap or alcohol gel before providing care to you. It is okay to ask anyone if they cleaned their hands before they touch you. 3. You will be actively involved in verifying the type of procedure you are having and ensuring the correct surgical site. This will be confirmed multiple times prior to your procedure. Do NOT chanda your surgery site UNLESS instructed to by your surgeon. 4. Do not shave or wax for 72 hours prior to procedure near your operative site. Shaving with a razor can irritate your skin and make it easier to develop an infection. On the day of your procedure, any hair that needs to be removed near the surgical site will be clipped by a healthcare worker using a special clippers designed to avoid skin irritation. 5. When you are in the operating room, your surgical site will be cleansed with a special soap, and in most cases, you will be given an antibiotic before the surgery begins. What to expect AFTER YOUR PROCEDURE:  1. Immediately following your procedure, your will be taken to the PACU for the first phase of your recovery. Your nurse will help you recover from any potential side effects of anesthesia, such as extreme drowsiness, changes in your vital signs or breathing patterns. Nausea, headache, muscle aches, or sore throat may also occur after anesthesia. Your nurse will help you manage these potential side effects. 2. For comfort and safety, arrange to have someone at home with you for the first 24 hours after discharge. 3. You and your family will be given written instructions about your diet, activity, dressing care, medications, and return visits. 4. Once at home, should issues with nausea, pain, or bleeding occur, or should you notice any signs of infection, you should call your surgeon. 5. Always clean your hands before and after caring for your wound. Do not let your family touch your surgery site without cleaning their hands. 6. Narcotic pain medications can cause significant constipation. You may want to add a stool softener to your postoperative medication schedule or speak to your surgeon on how best to manage this SIDE EFFECT. SPECIAL INSTRUCTIONS restricted visitation reviewed with patient that are in place as of today, patient acknowledges understanding of pre op instructions. Thank you for allowing us to care for you. We strive to exceed your expectations in the delivery of care and service provided to you and your family. If you need to contact the [facility] [place] [address] staff for any reason, please call us at [phone]    Instructions reviewed with patient during preadmission testing phone interview. Everton Bell. 1/25/2021 .12:32 PM      ADDITIONAL EDUCATIONAL INFORMATION REVIEWED PER PHONE WITH YOU AND/OR YOUR FAMILY:  No Bring a urine sample on day of surgery  No Hibiclens® Bathing Instructions   Yes Antibacterial Soap however patient states he \"tears her skin up\" and can not use.

## 2021-01-25 NOTE — PROGRESS NOTES
Discharge planning:  Patient to be discharged day of surgery to home with , Mercedez Harris for recovery.

## 2021-01-27 ENCOUNTER — OFFICE VISIT (OUTPATIENT)
Dept: PRIMARY CARE CLINIC | Age: 61
End: 2021-01-27
Payer: COMMERCIAL

## 2021-01-27 DIAGNOSIS — Z01.818 PREOP EXAMINATION: Primary | ICD-10-CM

## 2021-01-27 PROCEDURE — 99211 OFF/OP EST MAY X REQ PHY/QHP: CPT | Performed by: NURSE PRACTITIONER

## 2021-01-28 LAB — SARS-COV-2: NOT DETECTED

## 2021-02-01 ENCOUNTER — ANESTHESIA EVENT (OUTPATIENT)
Dept: OPERATING ROOM | Age: 61
End: 2021-02-01
Payer: COMMERCIAL

## 2021-02-01 DIAGNOSIS — E03.9 HYPOTHYROIDISM (ACQUIRED): ICD-10-CM

## 2021-02-01 LAB — TSH SERPL DL<=0.05 MIU/L-ACNC: 0.02 UIU/ML (ref 0.27–4.2)

## 2021-02-02 ENCOUNTER — ANESTHESIA (OUTPATIENT)
Dept: OPERATING ROOM | Age: 61
End: 2021-02-02
Payer: COMMERCIAL

## 2021-02-02 ENCOUNTER — APPOINTMENT (OUTPATIENT)
Dept: GENERAL RADIOLOGY | Age: 61
End: 2021-02-02
Attending: PODIATRIST
Payer: COMMERCIAL

## 2021-02-02 ENCOUNTER — HOSPITAL ENCOUNTER (OUTPATIENT)
Age: 61
Setting detail: OUTPATIENT SURGERY
Discharge: HOME | End: 2021-02-02
Attending: PODIATRIST | Admitting: PODIATRIST
Payer: COMMERCIAL

## 2021-02-02 VITALS
RESPIRATION RATE: 20 BRPM | BODY MASS INDEX: 33.65 KG/M2 | HEART RATE: 72 BPM | OXYGEN SATURATION: 95 % | SYSTOLIC BLOOD PRESSURE: 114 MMHG | TEMPERATURE: 96.2 F | HEIGHT: 68 IN | WEIGHT: 222 LBS | DIASTOLIC BLOOD PRESSURE: 73 MMHG

## 2021-02-02 VITALS — DIASTOLIC BLOOD PRESSURE: 67 MMHG | SYSTOLIC BLOOD PRESSURE: 119 MMHG | TEMPERATURE: 95.7 F | OXYGEN SATURATION: 98 %

## 2021-02-02 DIAGNOSIS — G89.18 POST-OP PAIN: Primary | ICD-10-CM

## 2021-02-02 PROCEDURE — 7100000010 HC PHASE II RECOVERY - FIRST 15 MIN: Performed by: PODIATRIST

## 2021-02-02 PROCEDURE — 7100000011 HC PHASE II RECOVERY - ADDTL 15 MIN: Performed by: PODIATRIST

## 2021-02-02 PROCEDURE — 3700000001 HC ADD 15 MINUTES (ANESTHESIA): Performed by: PODIATRIST

## 2021-02-02 PROCEDURE — C1713 ANCHOR/SCREW BN/BN,TIS/BN: HCPCS | Performed by: PODIATRIST

## 2021-02-02 PROCEDURE — 3600000014 HC SURGERY LEVEL 4 ADDTL 15MIN: Performed by: PODIATRIST

## 2021-02-02 PROCEDURE — 6360000002 HC RX W HCPCS: Performed by: NURSE ANESTHETIST, CERTIFIED REGISTERED

## 2021-02-02 PROCEDURE — 2500000003 HC RX 250 WO HCPCS: Performed by: PODIATRIST

## 2021-02-02 PROCEDURE — 6360000002 HC RX W HCPCS: Performed by: PODIATRIST

## 2021-02-02 PROCEDURE — 3700000000 HC ANESTHESIA ATTENDED CARE: Performed by: PODIATRIST

## 2021-02-02 PROCEDURE — 6360000002 HC RX W HCPCS: Performed by: ANESTHESIOLOGY

## 2021-02-02 PROCEDURE — 2500000003 HC RX 250 WO HCPCS: Performed by: NURSE ANESTHETIST, CERTIFIED REGISTERED

## 2021-02-02 PROCEDURE — 3600000004 HC SURGERY LEVEL 4 BASE: Performed by: PODIATRIST

## 2021-02-02 PROCEDURE — 2709999900 HC NON-CHARGEABLE SUPPLY: Performed by: PODIATRIST

## 2021-02-02 PROCEDURE — 7100000000 HC PACU RECOVERY - FIRST 15 MIN: Performed by: PODIATRIST

## 2021-02-02 PROCEDURE — 2580000003 HC RX 258: Performed by: ANESTHESIOLOGY

## 2021-02-02 PROCEDURE — 64445 NJX AA&/STRD SCIATIC NRV IMG: CPT | Performed by: ANESTHESIOLOGY

## 2021-02-02 PROCEDURE — 6370000000 HC RX 637 (ALT 250 FOR IP): Performed by: PODIATRIST

## 2021-02-02 PROCEDURE — 2580000003 HC RX 258: Performed by: PODIATRIST

## 2021-02-02 PROCEDURE — 73630 X-RAY EXAM OF FOOT: CPT

## 2021-02-02 PROCEDURE — 7100000001 HC PACU RECOVERY - ADDTL 15 MIN: Performed by: PODIATRIST

## 2021-02-02 DEVICE — ANCHOR SUT ORTHOCORD SZ 2 L36IN COMP BRAID CP-2 ARMED NDL: Type: IMPLANTABLE DEVICE | Site: ACHILLES TENDON | Status: FUNCTIONAL

## 2021-02-02 RX ORDER — LIDOCAINE HYDROCHLORIDE 20 MG/ML
INJECTION, SOLUTION INFILTRATION; PERINEURAL PRN
Status: DISCONTINUED | OUTPATIENT
Start: 2021-02-02 | End: 2021-02-02 | Stop reason: SDUPTHER

## 2021-02-02 RX ORDER — FENTANYL CITRATE 50 UG/ML
25 INJECTION, SOLUTION INTRAMUSCULAR; INTRAVENOUS EVERY 5 MIN PRN
Status: COMPLETED | OUTPATIENT
Start: 2021-02-02 | End: 2021-02-02

## 2021-02-02 RX ORDER — FENTANYL CITRATE 50 UG/ML
INJECTION, SOLUTION INTRAMUSCULAR; INTRAVENOUS PRN
Status: DISCONTINUED | OUTPATIENT
Start: 2021-02-02 | End: 2021-02-02 | Stop reason: SDUPTHER

## 2021-02-02 RX ORDER — ROCURONIUM BROMIDE 10 MG/ML
INJECTION, SOLUTION INTRAVENOUS PRN
Status: DISCONTINUED | OUTPATIENT
Start: 2021-02-02 | End: 2021-02-02 | Stop reason: SDUPTHER

## 2021-02-02 RX ORDER — HYDROCODONE BITARTRATE AND ACETAMINOPHEN 5; 325 MG/1; MG/1
1 TABLET ORAL ONCE
Status: COMPLETED | OUTPATIENT
Start: 2021-02-02 | End: 2021-02-02

## 2021-02-02 RX ORDER — HYDROCODONE BITARTRATE AND ACETAMINOPHEN 5; 325 MG/1; MG/1
1 TABLET ORAL EVERY 6 HOURS PRN
Qty: 14 TABLET | Refills: 0 | Status: SHIPPED | OUTPATIENT
Start: 2021-02-02 | End: 2021-02-06

## 2021-02-02 RX ORDER — FENTANYL CITRATE 50 UG/ML
25 INJECTION, SOLUTION INTRAMUSCULAR; INTRAVENOUS EVERY 5 MIN PRN
Status: DISCONTINUED | OUTPATIENT
Start: 2021-02-02 | End: 2021-02-02 | Stop reason: HOSPADM

## 2021-02-02 RX ORDER — PROMETHAZINE HYDROCHLORIDE 25 MG/ML
6.25 INJECTION, SOLUTION INTRAMUSCULAR; INTRAVENOUS
Status: DISCONTINUED | OUTPATIENT
Start: 2021-02-02 | End: 2021-02-02 | Stop reason: HOSPADM

## 2021-02-02 RX ORDER — SODIUM CHLORIDE, SODIUM LACTATE, POTASSIUM CHLORIDE, CALCIUM CHLORIDE 600; 310; 30; 20 MG/100ML; MG/100ML; MG/100ML; MG/100ML
INJECTION, SOLUTION INTRAVENOUS CONTINUOUS
Status: DISCONTINUED | OUTPATIENT
Start: 2021-02-02 | End: 2021-02-02 | Stop reason: HOSPADM

## 2021-02-02 RX ORDER — ONDANSETRON 4 MG/1
4 TABLET, ORALLY DISINTEGRATING ORAL EVERY 8 HOURS PRN
Qty: 20 TABLET | Refills: 0 | Status: SHIPPED | OUTPATIENT
Start: 2021-02-02 | End: 2022-01-29

## 2021-02-02 RX ORDER — ONDANSETRON 2 MG/ML
INJECTION INTRAMUSCULAR; INTRAVENOUS PRN
Status: DISCONTINUED | OUTPATIENT
Start: 2021-02-02 | End: 2021-02-02 | Stop reason: SDUPTHER

## 2021-02-02 RX ORDER — MAGNESIUM HYDROXIDE 1200 MG/15ML
LIQUID ORAL CONTINUOUS PRN
Status: COMPLETED | OUTPATIENT
Start: 2021-02-02 | End: 2021-02-02

## 2021-02-02 RX ORDER — ONDANSETRON 2 MG/ML
4 INJECTION INTRAMUSCULAR; INTRAVENOUS
Status: DISCONTINUED | OUTPATIENT
Start: 2021-02-02 | End: 2021-02-02 | Stop reason: HOSPADM

## 2021-02-02 RX ORDER — LEVOTHYROXINE SODIUM 137 UG/1
137 TABLET ORAL DAILY
Qty: 30 TABLET | Refills: 11 | Status: SHIPPED | OUTPATIENT
Start: 2021-02-02 | End: 2021-02-03 | Stop reason: SDUPTHER

## 2021-02-02 RX ORDER — OXYCODONE HYDROCHLORIDE AND ACETAMINOPHEN 5; 325 MG/1; MG/1
1 TABLET ORAL EVERY 6 HOURS PRN
Qty: 8 TABLET | Refills: 0 | Status: SHIPPED | OUTPATIENT
Start: 2021-02-02 | End: 2021-02-03

## 2021-02-02 RX ORDER — PROPOFOL 10 MG/ML
INJECTION, EMULSION INTRAVENOUS PRN
Status: DISCONTINUED | OUTPATIENT
Start: 2021-02-02 | End: 2021-02-02 | Stop reason: SDUPTHER

## 2021-02-02 RX ORDER — MIDAZOLAM HYDROCHLORIDE 1 MG/ML
INJECTION INTRAMUSCULAR; INTRAVENOUS PRN
Status: DISCONTINUED | OUTPATIENT
Start: 2021-02-02 | End: 2021-02-02 | Stop reason: SDUPTHER

## 2021-02-02 RX ORDER — DOXYCYCLINE HYCLATE 100 MG
100 TABLET ORAL 2 TIMES DAILY
Qty: 14 TABLET | Refills: 0 | Status: SHIPPED | OUTPATIENT
Start: 2021-02-02 | End: 2021-02-09

## 2021-02-02 RX ORDER — KETOROLAC TROMETHAMINE 30 MG/ML
30 INJECTION, SOLUTION INTRAMUSCULAR; INTRAVENOUS EVERY 6 HOURS PRN
Status: DISCONTINUED | OUTPATIENT
Start: 2021-02-02 | End: 2021-02-02 | Stop reason: HOSPADM

## 2021-02-02 RX ORDER — CEFAZOLIN SODIUM 2 G/50ML
2000 SOLUTION INTRAVENOUS ONCE
Status: COMPLETED | OUTPATIENT
Start: 2021-02-02 | End: 2021-02-02

## 2021-02-02 RX ORDER — BUPIVACAINE HYDROCHLORIDE 5 MG/ML
INJECTION, SOLUTION EPIDURAL; INTRACAUDAL PRN
Status: DISCONTINUED | OUTPATIENT
Start: 2021-02-02 | End: 2021-02-02 | Stop reason: ALTCHOICE

## 2021-02-02 RX ORDER — ROPIVACAINE HYDROCHLORIDE 5 MG/ML
INJECTION, SOLUTION EPIDURAL; INFILTRATION; PERINEURAL
Status: DISCONTINUED | OUTPATIENT
Start: 2021-02-02 | End: 2021-02-02 | Stop reason: SDUPTHER

## 2021-02-02 RX ORDER — DEXAMETHASONE SODIUM PHOSPHATE 4 MG/ML
INJECTION, SOLUTION INTRA-ARTICULAR; INTRALESIONAL; INTRAMUSCULAR; INTRAVENOUS; SOFT TISSUE PRN
Status: DISCONTINUED | OUTPATIENT
Start: 2021-02-02 | End: 2021-02-02 | Stop reason: SDUPTHER

## 2021-02-02 RX ADMIN — HYDROMORPHONE HYDROCHLORIDE 0.5 MG: 1 INJECTION, SOLUTION INTRAMUSCULAR; INTRAVENOUS; SUBCUTANEOUS at 13:17

## 2021-02-02 RX ADMIN — FENTANYL CITRATE 25 MCG: 50 INJECTION, SOLUTION INTRAMUSCULAR; INTRAVENOUS at 13:35

## 2021-02-02 RX ADMIN — LIDOCAINE HYDROCHLORIDE 100 MG: 20 INJECTION, SOLUTION INFILTRATION; PERINEURAL at 10:39

## 2021-02-02 RX ADMIN — MIDAZOLAM HYDROCHLORIDE 2 MG: 2 INJECTION, SOLUTION INTRAMUSCULAR; INTRAVENOUS at 10:33

## 2021-02-02 RX ADMIN — CEFAZOLIN SODIUM 2 G: 2 SOLUTION INTRAVENOUS at 10:58

## 2021-02-02 RX ADMIN — HYDROMORPHONE HYDROCHLORIDE 0.5 MG: 1 INJECTION, SOLUTION INTRAMUSCULAR; INTRAVENOUS; SUBCUTANEOUS at 13:02

## 2021-02-02 RX ADMIN — SODIUM CHLORIDE, POTASSIUM CHLORIDE, SODIUM LACTATE AND CALCIUM CHLORIDE: 600; 310; 30; 20 INJECTION, SOLUTION INTRAVENOUS at 08:58

## 2021-02-02 RX ADMIN — ROPIVACAINE HYDROCHLORIDE 30 ML: 5 INJECTION, SOLUTION EPIDURAL; INFILTRATION; PERINEURAL at 16:54

## 2021-02-02 RX ADMIN — DEXAMETHASONE SODIUM PHOSPHATE 8 MG: 4 INJECTION, SOLUTION INTRAMUSCULAR; INTRAVENOUS at 10:39

## 2021-02-02 RX ADMIN — HYDROCODONE BITARTRATE AND ACETAMINOPHEN 1 TABLET: 5; 325 TABLET ORAL at 15:31

## 2021-02-02 RX ADMIN — SUGAMMADEX 200 MG: 100 INJECTION, SOLUTION INTRAVENOUS at 12:15

## 2021-02-02 RX ADMIN — FENTANYL CITRATE 25 MCG: 50 INJECTION, SOLUTION INTRAMUSCULAR; INTRAVENOUS at 13:46

## 2021-02-02 RX ADMIN — FENTANYL CITRATE 25 MCG: 50 INJECTION, SOLUTION INTRAMUSCULAR; INTRAVENOUS at 13:40

## 2021-02-02 RX ADMIN — FENTANYL CITRATE 25 MCG: 50 INJECTION, SOLUTION INTRAMUSCULAR; INTRAVENOUS at 15:16

## 2021-02-02 RX ADMIN — ONDANSETRON 4 MG: 2 INJECTION INTRAMUSCULAR; INTRAVENOUS at 10:39

## 2021-02-02 RX ADMIN — FENTANYL CITRATE 50 MCG: 50 INJECTION INTRAMUSCULAR; INTRAVENOUS at 10:39

## 2021-02-02 RX ADMIN — KETOROLAC TROMETHAMINE 30 MG: 30 INJECTION, SOLUTION INTRAMUSCULAR at 15:08

## 2021-02-02 RX ADMIN — FENTANYL CITRATE 25 MCG: 50 INJECTION, SOLUTION INTRAMUSCULAR; INTRAVENOUS at 13:51

## 2021-02-02 RX ADMIN — PROPOFOL 200 MG: 10 INJECTION, EMULSION INTRAVENOUS at 10:39

## 2021-02-02 RX ADMIN — ROCURONIUM BROMIDE 50 MG: 10 INJECTION INTRAVENOUS at 10:39

## 2021-02-02 ASSESSMENT — PULMONARY FUNCTION TESTS
PIF_VALUE: 26
PIF_VALUE: 27
PIF_VALUE: 27
PIF_VALUE: 26
PIF_VALUE: 31
PIF_VALUE: 25
PIF_VALUE: 26
PIF_VALUE: 25
PIF_VALUE: 27
PIF_VALUE: 11
PIF_VALUE: 20
PIF_VALUE: 22
PIF_VALUE: 26
PIF_VALUE: 19
PIF_VALUE: 25
PIF_VALUE: 26
PIF_VALUE: 25
PIF_VALUE: 26
PIF_VALUE: 25
PIF_VALUE: 2
PIF_VALUE: 26
PIF_VALUE: 26
PIF_VALUE: 25
PIF_VALUE: 26
PIF_VALUE: 26
PIF_VALUE: 27
PIF_VALUE: 19
PIF_VALUE: 26
PIF_VALUE: 3
PIF_VALUE: 26
PIF_VALUE: 29
PIF_VALUE: 25
PIF_VALUE: 21
PIF_VALUE: 26
PIF_VALUE: 29
PIF_VALUE: 28
PIF_VALUE: 25
PIF_VALUE: 26
PIF_VALUE: 26
PIF_VALUE: 27
PIF_VALUE: 2
PIF_VALUE: 1
PIF_VALUE: 3

## 2021-02-02 ASSESSMENT — PAIN SCALES - GENERAL
PAINLEVEL_OUTOF10: 7
PAINLEVEL_OUTOF10: 0
PAINLEVEL_OUTOF10: 4
PAINLEVEL_OUTOF10: 5
PAINLEVEL_OUTOF10: 7
PAINLEVEL_OUTOF10: 5
PAINLEVEL_OUTOF10: 7

## 2021-02-02 NOTE — PROGRESS NOTES
Splint and ace removed by order of Dr. Brumfield Breath for 30 minutes.  Dr. Isaac Méndez paged to look at heel

## 2021-02-02 NOTE — ANESTHESIA POSTPROCEDURE EVALUATION
Department of Anesthesiology  Postprocedure Note    Patient: Eduardo Mendoza  MRN: [id number]  YOB: 1960  Date of evaluation: 2/2/2021  Time:  1:22 PM     Procedure Summary     Date: 02/02/21 Room / Location: [facility] [address] [place] [address] / [facility]    Anesthesia Start: 1035 Anesthesia Stop: 1226    Procedure: EXCISION POSTERIOR CALCANEAL EXOSTOSIS LEFT, DEBRIDEMENT AND REPAIR LEFT ACHILLES (Left Leg Lower) Diagnosis:       Posterior calcaneal exostosis, left      (Posterior calcaneal exostosis, left [M77.32] Achilles tear,left)    Surgeons: Maida Man DPM Responsible Provider: Perry Cary DO    Anesthesia Type: general ASA Status: 2          Anesthesia Type: general    Jefferson Phase I: Jefferson Score: 6    Jefferson Phase II:      Last vitals: Reviewed and per EMR flowsheets.        Anesthesia Post Evaluation    Patient location during evaluation: PACU  Patient participation: complete - patient participated  Level of consciousness: awake  Pain score: 2  Airway patency: patent  Nausea & Vomiting: no nausea and no vomiting  Complications: no  Cardiovascular status: blood pressure returned to baseline  Respiratory status: acceptable  Hydration status: euvolemic

## 2021-02-02 NOTE — PROGRESS NOTES
Ambulatory Surgery/Procedure Discharge Note    Vitals:    02/02/21 1656   BP: 114/73   Pulse: 72   Resp: 20   Temp: 96.2 °F (35.7 °C)   SpO2: 95%       No intake/output data recorded. Restroom use offered before discharge. Yes, pt void per bathroom, assist x1. Pain assessment:  level of pain (1-10, 10 severe)  Pain Level: 0  Pt to SDS post excision posterior calcaneal exostosis left, debridement and repair left achilles. Surgical dressing clean, dry and intact. Capillary refill brisk to operative site. Pt denies pain at this time. Pt denies nausea at this time. Discharge instructions and written prescriptions given to pt's  and he states understanding of these instructions. Pt states that she is ready for discharge at this time. Patient discharged to home/self care.  Patient discharged via wheel chair by transporter to waiting family/S.O.       2/2/2021 6:00 PM

## 2021-02-02 NOTE — ADDENDUM NOTE
Addendum  created 02/02/21 0784 by Ramses Vieira, DO    Child order released for a procedure order, Clinical Note Signed, Intraprocedure Blocks edited

## 2021-02-02 NOTE — PROGRESS NOTES
Pt awakened from sleep. Pain was down to level 2 and now heel pain starting up again to 7. Spoke to Dr. Olga Jasmine. Meds ordered. Dr. Tanja Barton paged about nerve block.  She will call Dr. Maryam Celis to see if ok

## 2021-02-02 NOTE — BRIEF OP NOTE
Brief Postoperative Note      Patient: Jewel Vallecillo  YOB: 1960  MRN: [id number]    Date of Procedure: 2/2/2021    Pre-Op Diagnosis: Posterior calcaneal exostosis, left [M77.32] Achilles tear,left    Post-Op Diagnosis: Same       Procedure(s):  EXCISION POSTERIOR CALCANEAL EXOSTOSIS LEFT, DEBRIDEMENT AND REPAIR LEFT ACHILLES    Surgeon(s):  Go Hinds DPM    Assistant:  Resident: JUAN DAVID Stauffer Si MS4    Anesthesia: General    Injectables: pre-op 8 cc 0.5% Marcaine plain and post-op 6 cc 0.5% Marcaine plain     Hemostasis: pneumatic thigh tourniquet at 300 mmHg for 56 minutes    Materials: 2-0 Vicryl, 4-0 Vicryl, 4-0 Nylon,  GII QuickAnchor Plus (x2)     Estimated Blood Loss: less than 10 cc    Complications: None    Specimens: Was Not Obtained    Findings: Hypertrophic Achilles tendon with calcaneal spur, resected without complication    Eh Beard DPM  Podiatric Resident PGY2  Pager [phone] or [employer]  2/2/2021, 12:27 PM        Electronically signed by Eh Beard DPM on 2/2/2021 at 12:24 PM

## 2021-02-02 NOTE — PROGRESS NOTES
PACU Transfer to [facility]  Pt's Current Allergies:     Pt meets criteria to transfer to next phase of care per JESSICA SCORE and ASPAN standards    No results for input(s): POCGLU in the last 72 hours. Vitals:    02/02/21 1645   BP: 120/73   Pulse: 72   Resp: 20   Temp: 98.2 °F (36.8 °C)   SpO2: 95%      BP within 20% of pt's admitting BP as per Jessica Score      Intake/Output Summary (Last 24 hours) at 2/2/2021 1652  Last data filed at 2/2/2021 1225  Gross per 24 hour   Intake 650 ml   Output --   Net 650 ml         Pain assessment: VS stable. No pain, no nausea after block left leg. Patient to [facility] bed #5 for discharge home.  updated in waiting room . Pain Level: 0    Patient was assessed for unknown alterations to skin integrity. Thereunknown alterations observed. Patient transferred to care of Wali Zafar RN.   Family updated and directed to Wali Zafar    2/2/2021 4:52 PM

## 2021-02-02 NOTE — H&P
Lisa Cope    [id number]    [facility] Same Day Surgery Update H & P  Department of General Surgery   Surgical Service   Pre-operative History and Physical  Last H & P within the last 30 days. 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 The symptoms have been recalcitrant to conservative treatment and the patient presents today for the above procedure. Covid 19:  Patient denies fever, chills, cough or known exposure to Covid-19. Past Medical History:        Diagnosis Date    Arthralgia of hand, right     Chronic seasonal allergic rhinitis due to pollen     Edema of both legs     Hyperlipidemia, mixed     past hx BORDERLINE    Hypothyroidism (acquired)     Non morbid obesity due to excess calories     Psoriasis (a type of skin inflammation)     Psoriatic arthritis [facility])      Past Surgical History:        Procedure Laterality Date    BRONCHOSCOPY  06/05/2017    CHOLECYSTECTOMY, LAPAROSCOPIC  10/12/2016    with milena / Dr. Hemalatha Webb.      FOOT SURGERY Left     Left cyst removal    HYSTERECTOMY  1987    Partial    SHOULDER SURGERY      Right x 3 / Rotator Cuff and Bone Spurs    SHOULDER SURGERY      Left / Rotator Cuff and Bone Spur    SINUS ENDOSCOPY  1990    TOE SURGERY Right 07/06/2010    Right great toe osteotomy    WISDOM TOOTH EXTRACTION      WRIST SURGERY Right     Dequervain's, carpal tunnel     Past Social History:  Social History     Socioeconomic History    Marital status:      Spouse name: None    Number of children: 2    Years of education: None    Highest education level: None   Occupational History    Occupation: Retired    Social Needs    Financial resource strain: None    Food insecurity     Worry: None     Inability: None    Transportation needs     Medical: None     Non-medical: None   Tobacco Use    Smoking status: Never Smoker    Smokeless tobacco: Never Used   Substance and Sexual Activity    Alcohol use: Not Currently    Drug use: No    Sexual activity: Yes     Partners: Male   Lifestyle    Physical activity     Days per week: None     Minutes per session: None    Stress: None   Relationships    Social connections     Talks on phone: None     Gets together: None     Attends Yarsani service: None     Active member of club or organization: None     Attends meetings of clubs or organizations: None     Relationship status: None    Intimate partner violence     Fear of current or ex partner: None     Emotionally abused: None     Physically abused: None     Forced sexual activity: None   Other Topics Concern    None   Social History Narrative    None         Medications Prior to Admission:      Prior to Admission medications    Medication Sig Start Date End Date Taking? Authorizing Provider   levothyroxine (SYNTHROID) 137 MCG tablet Take 1 tablet by mouth Daily 2/2/21  Yes Junious Milks, DO   Omega-3 Fatty Acids (FISH OIL PO) Take by mouth   Yes Historical Provider, MD   Multiple Vitamins-Minerals (MULTIVITAMIN ADULT PO) Take 1 tablet by mouth daily   Yes Historical Provider, MD   OTEZLA 30 MG TABS 2 times daily  1/18/21   Historical Provider, MD   meloxicam (MOBIC) 15 MG tablet Take 15 mg by mouth daily    Historical Provider, MD   cetirizine (ZYRTEC) 10 MG tablet Take 10 mg by mouth as needed     Historical Provider, MD   folic acid (FOLVITE) 1 MG tablet Take 2 mg by mouth daily  12/27/14   Historical Provider, MD   methotrexate (RHEUMATREX) 2.5 MG chemo tablet Take 15 mg by mouth once a week  12/27/14   Historical Provider, MD   aspirin 81 MG EC tablet Take 81 mg by mouth daily.       Historical Provider, MD         Allergies:  Adhesive tape, Percocet [oxycodone-acetaminophen], Benzamide derivatives, and Sulfasalazine    PHYSICAL EXAM:      BP (!) 149/92   Pulse 88   Temp 97.8 °F (36.6 °C) (Oral)   Resp 16   Ht 5' 8\" (1.727 m)   Wt 222 lb (100.7 kg)   SpO2 99%   BMI 33.75 kg/m²      Airway:  Airway patent with no audible stridor    Heart:  Regular rate and rhythm, No murmur noted    Lungs:  No increased work of breathing, good air exchange, clear to auscultation bilaterally, no crackles or wheezing    Abdomen:  Soft, non-distended, non-tender, normal active bowel sounds, no masses palpated    ASSESSMENT AND PLAN    Patient is a 61 y.o. female with above specified procedure planned. 1.  Patient seen and focused exam done today- no new changes since last physical exam on 1/20/21    2. Access to ancillary services are available per request of the provider.     FAB Soto - DONALD     2/2/2021

## 2021-02-02 NOTE — ANESTHESIA PRE PROCEDURE
Department of Anesthesiology  Preprocedure Note       Name:  Natividad Wu   Age:  61 y.o.  :  1960                                          MRN:  [id number]         Date:  2021      Surgeon: Stacy Vergara):  Marcial Mendoza DPM    Procedure: Procedure(s):  EXCISION POSTERIOR CALCANEAL EXOSTOSIS LEFT, DEBRIDEMENT AND REPAIR LEFT ACHILLES    Medications prior to admission:   Prior to Admission medications    Medication Sig Start Date End Date Taking? Authorizing Provider   levothyroxine (SYNTHROID) 137 MCG tablet Take 1 tablet by mouth Daily 21  Yes Junious Milks, DO   Omega-3 Fatty Acids (FISH OIL PO) Take by mouth   Yes Historical Provider, MD   Multiple Vitamins-Minerals (MULTIVITAMIN ADULT PO) Take 1 tablet by mouth daily   Yes Historical Provider, MD   OTEZLA 30 MG TABS 2 times daily  21   Historical Provider, MD   meloxicam (MOBIC) 15 MG tablet Take 15 mg by mouth daily    Historical Provider, MD   cetirizine (ZYRTEC) 10 MG tablet Take 10 mg by mouth as needed     Historical Provider, MD   folic acid (FOLVITE) 1 MG tablet Take 2 mg by mouth daily  14   Historical Provider, MD   methotrexate (RHEUMATREX) 2.5 MG chemo tablet Take 15 mg by mouth once a week  14   Historical Provider, MD   aspirin 81 MG EC tablet Take 81 mg by mouth daily. Historical Provider, MD       Current medications:    Current Facility-Administered Medications   Medication Dose Route Frequency Provider Last Rate Last Admin    ceFAZolin (ANCEF) 2000 mg in dextrose 3 % 50 mL IVPB (duplex)  2,000 mg Intravenous Once Marcial Mendoza DPM        lactated ringers infusion   Intravenous Continuous Lizbeth Padgett  mL/hr at 21 0858 New Bag at 21 8927       Allergies:     Allergies   Allergen Reactions    Adhesive Tape Rash    Percocet [Oxycodone-Acetaminophen] Itching and Rash    Benzamide Derivatives Other (See Comments) Counseling given: Not Answered      Vital Signs (Current):   Vitals:    01/25/21 1206 02/02/21 0843   BP:  (!) 149/92   Pulse:  88   Resp:  16   Temp:  97.8 °F (36.6 °C)   TempSrc:  Oral   SpO2:  99%   Weight: 222 lb (100.7 kg) 222 lb (100.7 kg)   Height: 5' 8\" (1.727 m) 5' 8\" (1.727 m)                                              BP Readings from Last 3 Encounters:   02/02/21 (!) 149/92   01/20/21 120/82   07/02/20 (!) 155/85       NPO Status: Time of last liquid consumption: 1800                        Time of last solid consumption: 1800                        Date of last liquid consumption: 02/01/21                        Date of last solid food consumption: 02/01/21    BMI:   Wt Readings from Last 3 Encounters:   02/02/21 222 lb (100.7 kg)   01/20/21 222 lb (100.7 kg)   07/02/20 238 lb 12.1 oz (108.3 kg)     Body mass index is 33.75 kg/m². CBC:   Lab Results   Component Value Date    WBC 9.0 09/03/2019    RBC 4.02 09/03/2019    HGB 13.7 09/03/2019    HCT 39.8 09/03/2019    MCV 99.1 09/03/2019    RDW 13.1 09/03/2019     09/03/2019       CMP:   Lab Results   Component Value Date     12/10/2020    K 4.6 12/10/2020     12/10/2020    CO2 24 12/10/2020    BUN 15 12/10/2020    CREATININE 0.7 12/10/2020    GFRAA >60 12/10/2020    GFRAA >60 05/20/2013    AGRATIO 1.3 09/03/2019    LABGLOM >60 12/10/2020    GLUCOSE 87 12/10/2020    PROT 8.0 09/03/2019    CALCIUM 10.5 12/10/2020    BILITOT 0.3 09/03/2019    ALKPHOS 80 09/03/2019    AST 25 09/03/2019    ALT 31 09/03/2019       POC Tests: No results for input(s): POCGLU, POCNA, POCK, POCCL, POCBUN, POCHEMO, POCHCT in the last 72 hours.     Coags:   Lab Results   Component Value Date    PROTIME 11.0 06/02/2017    INR 0.97 06/02/2017       HCG (If Applicable): No results found for: PREGTESTUR, PREGSERUM, HCG, HCGQUANT     ABGs: No results found for: PHART, PO2ART, KTA3CIK, BHF4SXB, BEART, S2HMYLUE

## 2021-02-02 NOTE — OP NOTE
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] Achilles tear,left     Post-Op Diagnosis: Same       Procedure(s):  EXCISION POSTERIOR CALCANEAL EXOSTOSIS LEFT, DEBRIDEMENT AND REPAIR LEFT ACHILLES     Surgeon(s):  Lashon Peralta DPM     Assistant:  Resident: JUAN DAVID Vázquez [facility] MS4     Anesthesia: General     Injectables: pre-op 8 cc 0.5% Marcaine plain and post-op 6 cc 0.5% Marcaine plain      Hemostasis: pneumatic thigh tourniquet at 300 mmHg for 56 minutes     Materials: 2-0 Vicryl, 4-0 Vicryl, 4-0 Nylon,  GII QuickAnchor Plus (x2)      Estimated Blood Loss: less than 10 cc     Complications: None     INDICATIONS FOR PROCEDURE: This patient has signs and symptoms clinically and radiographically consistent with the above mentioned preoperative diagnosis. Having failed conservative treatment, it was determined that the patient would benefit from surgical intervention. All potential risks, benefits, and complications were discussed with the patient prior to the scheduling of surgery. All the patient's questions were answered and no guarantees were given. The patient wished to proceed with surgery, and informed written consent was obtained.      DETAILS OF PROCEDURE: The patient was brought from the pre-operative area and remained on the PACU bed at this time. Following IV sedation and intubation, a pneumatic thigh tourniquet was placed around the patient's well-padded left lower extremity the patient was then transferred to the operating table in a prone position. The left lower extremity was then scrubbed, prepped, and draped in the usual sterile fashion. A time-out was performed. The patient, procedure, and operative site were confirmed. A local anesthetic block was then injected proximal to the incision site consisting of 8 cc of 0.5% Marcaine plain.   An Esmarch bandage was then utilized to exsanguinate the patient's right lower extremity. The tourniquet was then inflated to 300 mmHg and the following procedure was performed.      PROCEDURE # 1  [address]  At this time, attention was directed to the patient's left foot where preoperatively, a large palpable retrocalcaneal exostosis was noted. At this time, an approximately 7 cm linear skin incision was created along the midline of the patient Achilles tendon. The incision was deepened down to the level of the subcutaneous tissue. All coursing venous tributaries were identified, isolated and electrocoagulated as encountered. All vital neurovascular structures were retracted in a medial-to-lateral-type fashion. At this time, the paratenon and the Achilles tendon was next incised in a linear longitudinal fashion down to the level of the patient heel. It was noted that there was a significant amount of degenerative fibrotic Achilles tendinous tissue, in addition to intrasubstance calcifications in the inferior achilles tendon. The decision was made to repair this following the retrocalcaneal exostectomy. At this time, the retrocalcaneal exostosis was identified and a combination of sharp blunt dissection was utilized to free the exostosis from the surrounding soft tissue. Next, a curved osteotome was utilized to osteotomize the retrocalcaneal exostosis from proximal and distal medial lateral through-and-through. Following this, the osteotomized portion of bone was dissected free from all soft tissue attachments and removed in total from the surgical site.  Once all bony prominences at the posterior calcaneus were excised, a power rasp was utilized to smooth the posterior calcaneus until normal anatomic contour could be achieved.     PROCEDURE # 2   REPAIR ACHILLES TENDON, LEFT:   Attention was then directed back to the inferior aspect of the achilles tendon where the previously mentioned intrasubstance calcifications and degenerative fibrous tissue was noted. Using sharp dissection via #15 blade, the intrasubstance calcifications were dissected free and passed from the operative field. The degenerative fibrous tissue was excised until normal white healthy glistening tendinous tissue remained. C-arm was utilized to confirm complete resection of all exostosis and intrasubstance abnormalities. The wound was flushed with copious amounts of normal saline and attention was directed towards repair of the achilles tendon.     At this time, GII QuickAnchor Plus sututre anchor system was utilized using standard technique and the 's recommendations. Two drill holes were created in the posterior inferior calcaneus, one anchor 1 cm superior to the insertion of the achilles tendon and one anchor approximately 0.5 cm and 1 cm inferior to the insertion of the achilles tendon. These drill holes were tapped and the anchors with provided suture attached to the anchors was utilized to anchor the achilles tendon back down to the calcaneus via simple interrupted suture fashion. Next, the achilles tendon with attached paratenon was reapproximated using 2-0 Vicryl in a running suture technique. The ankle was taken through a full range of motion and good approximation and tension of the achilles tendon was noted. The subcutaneous tissue was re-approximated using 4-0 Vicryl in a running subcuticular suture technique. The skin was re-approximated using 2-0 Nylon in a horizontal mattress suture fashion.      At this time, a local anesthetic was injected about the incision sites consisting of 6 cc 0.5% Marcaine plain, for the patient's postoperative comfort. A soft sterile dressing was applied consisting of adaptic, gauze, jose, cast padding, and posterior splint.  The pneumatic thigh tourniquet was rapidly deflated after a total time of 56 minutes and a prompt hyperemic response was noted on all aspects of the patient's left lower extremity.     END OF PROCEDURE: The patient tolerated the procedure and anesthesia well and was transported from the operating room to the PACU with vital signs stable and vascular status intact to all aspects of the patient's left lower extremity and digital capillary refill time immediate to the digits of the left foot. Following a period of post-operative monitoring, the patient will be discharged home with written and oral wound care and follow-up instructions. The patient was provided with prescriptions for Percocet, Norco, Zofran and Doxycycline. The patient is to follow-up with Dr. Hedy Mckinney in his private office within 3-5 days. The patient is to keep dressing clean, dry and intact at all times.  The patient is to call if any complications occur.     This operative report was dictated on behalf of Dr. Abiodun Mendoza DPM  Podiatric Resident PGY2  Pager [phone] or Susan  2/2/2021, 1:49 PM      Electronically signed by Puneet Javed DPM on 2/2/2021 at 1:49 PM

## 2021-02-02 NOTE — ANESTHESIA PROCEDURE NOTES
Peripheral Block    Patient location during procedure: post-op  Start time: 2/2/2021 4:30 PM  End time: 2/2/2021 4:35 PM  Staffing  Performed: anesthesiologist   Anesthesiologist: Jazmyn Green DO  Preanesthetic Checklist  Completed: patient identified, IV checked, site marked, risks and benefits discussed, surgical consent, monitors and equipment checked, pre-op evaluation, timeout performed, anesthesia consent given, oxygen available and patient being monitored  Peripheral Block  Patient position: supine  Prep: ChloraPrep  Patient monitoring: cardiac monitor, continuous pulse ox, frequent blood pressure checks and IV access  Block type: Sciatic  Laterality: left  Injection technique: single-shot  Guidance: ultrasound guided  Popliteal  Provider prep: mask and sterile gloves  Needle  Needle type: short-bevel   Needle gauge: 22 G  Needle length: 5 cm  Needle localization: ultrasound guidance  Assessment  Injection assessment: negative aspiration for heme, no paresthesia on injection and local visualized surrounding nerve on ultrasound  Hemodynamics: stable  Additional Notes  Pain increased to 7 - post op block in pacu - pain now 0   Medications Administered  Ropivacaine (NAROPIN) injection 0.5%, 30 mL  Reason for block: procedure for pain, post-op pain management and at surgeon's request

## 2021-02-03 RX ORDER — LEVOTHYROXINE SODIUM 137 UG/1
137 TABLET ORAL DAILY
Qty: 90 TABLET | Refills: 3 | Status: SHIPPED | OUTPATIENT
Start: 2021-02-03 | End: 2022-02-01 | Stop reason: SDUPTHER

## 2021-03-16 ENCOUNTER — IMMUNIZATION (OUTPATIENT)
Dept: PRIMARY CARE CLINIC | Age: 61
End: 2021-03-16
Payer: COMMERCIAL

## 2021-03-16 PROCEDURE — 0011A COVID-19, MODERNA VACCINE 100MCG/0.5ML DOSE: CPT | Performed by: FAMILY MEDICINE

## 2021-03-16 PROCEDURE — 91301 COVID-19, MODERNA VACCINE 100MCG/0.5ML DOSE: CPT | Performed by: FAMILY MEDICINE

## 2021-04-13 ENCOUNTER — IMMUNIZATION (OUTPATIENT)
Dept: PRIMARY CARE CLINIC | Age: 61
End: 2021-04-13
Payer: COMMERCIAL

## 2021-04-13 PROCEDURE — 91301 COVID-19, MODERNA VACCINE 100MCG/0.5ML DOSE: CPT | Performed by: FAMILY MEDICINE

## 2021-04-13 PROCEDURE — 0012A COVID-19, MODERNA VACCINE 100MCG/0.5ML DOSE: CPT | Performed by: FAMILY MEDICINE

## 2021-08-30 ENCOUNTER — TELEPHONE (OUTPATIENT)
Dept: FAMILY MEDICINE CLINIC | Age: 61
End: 2021-08-30

## 2021-08-30 NOTE — TELEPHONE ENCOUNTER
----- Message from Ger Villagran sent at 8/30/2021  1:18 PM EDT -----  Subject: Message to Provider    QUESTIONS  Information for Provider? Patient would like to know if her mother could   establish care with Dr. Santosh Rees. If he can not take her would like to   know if anyone else would take her on as a patient.  ---------------------------------------------------------------------------  --------------  [address]  What is the best way for the office to contact you? OK to leave message on   voicemail  Preferred Call Back Phone Number? [phone]  ---------------------------------------------------------------------------  --------------  SCRIPT ANSWERS  Relationship to Patient?  Self

## 2022-01-29 ASSESSMENT — ENCOUNTER SYMPTOMS: SHORTNESS OF BREATH: 0

## 2022-01-29 NOTE — PROGRESS NOTES
Subjective:      Patient ID: Cale Long is a 64 y.o. female. HPI     Hypothyroidism:  Patient is tolerating and compliant with Synthroid 137 mcg daily.  She is due today for a TSH recheck. Hyperlipidemia:  Patient takes Fish Oil daily. She is fasting today for a lipid recheck.          Psoriatic Arthritis:  Patient sees Dr. Matty Marin and is on Otezla BID, and Mobic 15 mg daily. Allergic Rhinitis:  Patient takes Zyrtec 10 mg daily as needed and feels that the medication works well to control her allergy symptoms. Review of Systems   Constitutional: Negative for chills and fever. Respiratory: Negative for shortness of breath. Cardiovascular: Positive for leg swelling. Negative for chest pain and palpitations. /80   Ht 5' 8\" (1.727 m)   Wt 208 lb 6.4 oz (94.5 kg)   BMI 31.69 kg/m²    Objective:   Physical Exam  Constitutional:       General: She is not in acute distress. Appearance: She is well-developed. HENT:      Head: Normocephalic. Right Ear: External ear normal.      Left Ear: External ear normal.      Mouth/Throat:      Pharynx: No oropharyngeal exudate. Neck:      Thyroid: No thyromegaly. Vascular: No JVD. Cardiovascular:      Rate and Rhythm: Normal rate and regular rhythm. Heart sounds: Normal heart sounds. No murmur heard. Pulmonary:      Effort: Pulmonary effort is normal.      Breath sounds: Normal breath sounds. No wheezing or rales. Lymphadenopathy:      Cervical: No cervical adenopathy. Neurological:      Mental Status: She is alert and oriented to person, place, and time.          Assessment:      Hypothyroidism  Hyperlipidemia  Psoriatic Arthritis  Allergic Rhinitis       Plan:      Chem 7, Lipid Panel, TSH  Refilled medications  Flu Shot Declined   I recommended the COVID booster  Cologuard Test Ordered   Mammogram order printed   RTO 1 year for edema        [address] [facility] DO Khanh

## 2022-02-01 ENCOUNTER — OFFICE VISIT (OUTPATIENT)
Dept: FAMILY MEDICINE CLINIC | Age: 62
End: 2022-02-01
Payer: COMMERCIAL

## 2022-02-01 VITALS
SYSTOLIC BLOOD PRESSURE: 120 MMHG | WEIGHT: 208.4 LBS | DIASTOLIC BLOOD PRESSURE: 80 MMHG | BODY MASS INDEX: 31.58 KG/M2 | HEIGHT: 68 IN

## 2022-02-01 DIAGNOSIS — Z00.00 PREVENTATIVE HEALTH CARE: ICD-10-CM

## 2022-02-01 DIAGNOSIS — Z12.31 ENCOUNTER FOR SCREENING MAMMOGRAM FOR MALIGNANT NEOPLASM OF BREAST: ICD-10-CM

## 2022-02-01 DIAGNOSIS — E03.9 HYPOTHYROIDISM (ACQUIRED): Primary | ICD-10-CM

## 2022-02-01 DIAGNOSIS — L40.50 PSORIATIC ARTHRITIS (HCC): ICD-10-CM

## 2022-02-01 DIAGNOSIS — E78.2 HYPERLIPIDEMIA, MIXED: ICD-10-CM

## 2022-02-01 DIAGNOSIS — J30.1 CHRONIC SEASONAL ALLERGIC RHINITIS DUE TO POLLEN: ICD-10-CM

## 2022-02-01 DIAGNOSIS — Z23 NEED FOR INFLUENZA VACCINATION: ICD-10-CM

## 2022-02-01 DIAGNOSIS — Z12.11 SCREEN FOR COLON CANCER: ICD-10-CM

## 2022-02-01 LAB
ANION GAP SERPL CALCULATED.3IONS-SCNC: 15 MMOL/L (ref 3–16)
BUN BLDV-MCNC: 19 MG/DL (ref 7–20)
CALCIUM SERPL-MCNC: 9.9 MG/DL (ref 8.3–10.6)
CHLORIDE BLD-SCNC: 104 MMOL/L (ref 99–110)
CHOLESTEROL, TOTAL: 205 MG/DL (ref 0–199)
CO2: 23 MMOL/L (ref 21–32)
CREAT SERPL-MCNC: 0.7 MG/DL (ref 0.6–1.2)
GFR AFRICAN AMERICAN: >60
GFR NON-AFRICAN AMERICAN: >60
GLUCOSE BLD-MCNC: 91 MG/DL (ref 70–99)
HDLC SERPL-MCNC: 39 MG/DL (ref 40–60)
LDL CHOLESTEROL CALCULATED: 126 MG/DL
POTASSIUM SERPL-SCNC: 4.1 MMOL/L (ref 3.5–5.1)
SODIUM BLD-SCNC: 142 MMOL/L (ref 136–145)
TRIGL SERPL-MCNC: 198 MG/DL (ref 0–150)
TSH SERPL DL<=0.05 MIU/L-ACNC: 0.07 UIU/ML (ref 0.27–4.2)
VLDLC SERPL CALC-MCNC: 40 MG/DL

## 2022-02-01 PROCEDURE — 99214 OFFICE O/P EST MOD 30 MIN: CPT | Performed by: FAMILY MEDICINE

## 2022-02-01 PROCEDURE — 36415 COLL VENOUS BLD VENIPUNCTURE: CPT | Performed by: FAMILY MEDICINE

## 2022-02-01 RX ORDER — LEVOTHYROXINE SODIUM 137 UG/1
137 TABLET ORAL DAILY
Qty: 90 TABLET | Refills: 3 | Status: SHIPPED | OUTPATIENT
Start: 2022-02-01 | End: 2022-02-02

## 2022-02-01 SDOH — ECONOMIC STABILITY: FOOD INSECURITY: WITHIN THE PAST 12 MONTHS, YOU WORRIED THAT YOUR FOOD WOULD RUN OUT BEFORE YOU GOT MONEY TO BUY MORE.: NEVER TRUE

## 2022-02-01 SDOH — ECONOMIC STABILITY: FOOD INSECURITY: WITHIN THE PAST 12 MONTHS, THE FOOD YOU BOUGHT JUST DIDN'T LAST AND YOU DIDN'T HAVE MONEY TO GET MORE.: NEVER TRUE

## 2022-02-01 ASSESSMENT — PATIENT HEALTH QUESTIONNAIRE - PHQ9
SUM OF ALL RESPONSES TO PHQ9 QUESTIONS 1 & 2: 0
1. LITTLE INTEREST OR PLEASURE IN DOING THINGS: 0
SUM OF ALL RESPONSES TO PHQ QUESTIONS 1-9: 0
2. FEELING DOWN, DEPRESSED OR HOPELESS: 0

## 2022-02-01 ASSESSMENT — SOCIAL DETERMINANTS OF HEALTH (SDOH): HOW HARD IS IT FOR YOU TO PAY FOR THE VERY BASICS LIKE FOOD, HOUSING, MEDICAL CARE, AND HEATING?: NOT HARD AT ALL

## 2022-02-02 ENCOUNTER — NURSE ONLY (OUTPATIENT)
Dept: FAMILY MEDICINE CLINIC | Age: 62
End: 2022-02-02
Payer: COMMERCIAL

## 2022-02-02 DIAGNOSIS — Z23 NEED FOR INFLUENZA VACCINATION: Primary | ICD-10-CM

## 2022-02-02 PROCEDURE — 90674 CCIIV4 VAC NO PRSV 0.5 ML IM: CPT | Performed by: FAMILY MEDICINE

## 2022-02-02 PROCEDURE — 90471 IMMUNIZATION ADMIN: CPT | Performed by: FAMILY MEDICINE

## 2022-02-02 RX ORDER — LEVOTHYROXINE SODIUM 112 UG/1
112 TABLET ORAL DAILY
Qty: 90 TABLET | Refills: 3 | Status: SHIPPED | OUTPATIENT
Start: 2022-02-02

## 2022-02-16 LAB — NONINV COLON CA DNA+OCC BLD SCRN STL QL: NEGATIVE

## 2022-04-07 ENCOUNTER — TELEPHONE (OUTPATIENT)
Dept: FAMILY MEDICINE CLINIC | Age: 62
End: 2022-04-07

## 2022-04-07 NOTE — TELEPHONE ENCOUNTER
Patient update. Patient requesting lab work to recheck thyroid. Please advise if this okay? I have patient on the schedule for lab/MA visit on 4/11 at 8:45. I can cancel appointment if needed.

## 2022-04-11 ENCOUNTER — NURSE ONLY (OUTPATIENT)
Dept: FAMILY MEDICINE CLINIC | Age: 62
End: 2022-04-11
Payer: COMMERCIAL

## 2022-04-11 DIAGNOSIS — E03.9 HYPOTHYROIDISM (ACQUIRED): Primary | ICD-10-CM

## 2022-04-11 LAB — TSH REFLEX: 0.56 UIU/ML (ref 0.27–4.2)

## 2022-04-11 PROCEDURE — 36415 COLL VENOUS BLD VENIPUNCTURE: CPT | Performed by: FAMILY MEDICINE

## 2022-04-11 NOTE — PROGRESS NOTES
Patient here for blood work. Blood drawn from [facility] without complications.

## 2022-04-19 ENCOUNTER — HOSPITAL ENCOUNTER (OUTPATIENT)
Dept: WOMENS IMAGING | Age: 62
Discharge: HOME | End: 2022-04-19
Payer: COMMERCIAL

## 2022-04-19 DIAGNOSIS — Z12.31 ENCOUNTER FOR SCREENING MAMMOGRAM FOR MALIGNANT NEOPLASM OF BREAST: ICD-10-CM

## 2022-04-19 PROCEDURE — 77063 BREAST TOMOSYNTHESIS BI: CPT

## 2022-12-30 RX ORDER — LEVOTHYROXINE SODIUM 112 UG/1
TABLET ORAL
Qty: 90 TABLET | Refills: 0 | Status: SHIPPED | OUTPATIENT
Start: 2022-12-30

## 2023-04-06 PROBLEM — M25.531 RIGHT WRIST PAIN: Status: RESOLVED | Noted: 2018-03-01 | Resolved: 2023-04-06

## 2023-06-26 RX ORDER — LEVOTHYROXINE SODIUM 112 UG/1
TABLET ORAL
Qty: 90 TABLET | Refills: 2 | Status: SHIPPED | OUTPATIENT
Start: 2023-06-26

## 2023-06-26 NOTE — TELEPHONE ENCOUNTER
Last office visit 4/10/2023       Next office visit scheduled Visit date not found    Requested Prescriptions     Pending Prescriptions Disp Refills    levothyroxine (SYNTHROID) 112 MCG tablet [Pharmacy Med Name: L-THYROXINE (SYNTHROID) TABS 112MCG] 90 tablet 3     Sig: TAKE 1 TABLET DAILY

## 2023-08-08 ENCOUNTER — HOSPITAL ENCOUNTER (OUTPATIENT)
Dept: WOMENS IMAGING | Age: 63
Discharge: HOME | End: 2023-08-08
Payer: COMMERCIAL

## 2023-08-08 DIAGNOSIS — Z12.31 ENCOUNTER FOR SCREENING MAMMOGRAM FOR MALIGNANT NEOPLASM OF BREAST: ICD-10-CM

## 2023-08-08 PROCEDURE — 77063 BREAST TOMOSYNTHESIS BI: CPT

## 2024-04-11 ENCOUNTER — TELEPHONE (OUTPATIENT)
Dept: FAMILY MEDICINE CLINIC | Age: 64
End: 2024-04-11

## 2024-04-11 NOTE — TELEPHONE ENCOUNTER
Subjective:       Patient ID: Renee Arteaga is a 76 y.o. female.    Chief Complaint: Rash    HPI Ms. Arteaga presents today for rash. This has been present for a while.   Seen Dermatology 3 times     Diagnosis    Xerosis cutis     Inflamed seborrheic keratosis     Actinic keratosis     Dermatitis, unspecified    She was given topical treatment  She feels it got better on its own        Review of Systems   Constitutional: Positive for activity change and appetite change. Negative for fatigue and fever.   HENT: Negative for congestion, ear pain and sinus pressure.    Eyes: Negative for pain and visual disturbance.   Respiratory: Negative for cough, chest tightness and wheezing.    Cardiovascular: Negative for chest pain, palpitations and leg swelling.   Gastrointestinal: Negative for abdominal distention, abdominal pain, constipation, diarrhea, nausea and vomiting.   Endocrine: Negative for polydipsia and polyuria.   Genitourinary: Negative for difficulty urinating, dyspareunia, dysuria, flank pain and hematuria.   Musculoskeletal: Negative for arthralgias and back pain.   Skin: Positive for rash.   Neurological: Negative for dizziness, tremors, syncope, weakness, numbness and headaches.   Psychiatric/Behavioral: Positive for dysphoric mood. Negative for agitation and confusion.           Past Medical History:   Diagnosis Date    Anticoagulant long-term use     Pletal/ASA    Anxiety     Arthritis     Atherosclerotic PVD with intermittent claudication 4/12/2018    Blue toe syndrome of right lower extremity 4/12/2018    Colon polyp 03/14/2015    Depression     Heart murmur     Hyperlipidemia     Scoliosis     Tachycardia      Past Surgical History:   Procedure Laterality Date    BACK SURGERY      bladder/rectum lift      BREAST BIOPSY Left 2018    benign    BREAST SURGERY      HYSTERECTOMY      OOPHORECTOMY       Family History   Problem Relation Age of Onset    Arthritis Mother     Lung disease Mother  Yes, she will need labs.  We can just draw her blood at her appointment.        Heart disease Father      Social History     Socioeconomic History    Marital status:    Tobacco Use    Smoking status: Never Smoker    Smokeless tobacco: Never Used   Substance and Sexual Activity    Alcohol use: No    Drug use: No       Objective:        Physical Exam  Vitals reviewed.   HENT:      Ears:      Comments: Hard of hearing  Skin:     General: Skin is warm.      Findings: Rash: scattered hyperpigmented spots on extremities.   Neurological:      Mental Status: She is alert and oriented to person, place, and time.   Psychiatric:         Mood and Affect: Mood normal.         Behavior: Behavior normal.           Results for orders placed or performed during the hospital encounter of 04/12/22   Cardiac Monitor - 3-15 Day Adult (Cupid Only)   Result Value Ref Range    Sinus min HR 42     Sinus max hr 102     Sinus avg hr 60        Assessment/Plan:     Itching  -     hydrOXYzine (ATARAX) 50 MG tablet; TAKE 1 TABLET BY MOUTH THREE TIMES DAILY AS NEEDED FOR ITCHING OR ANXIETY.  Dispense: 60 tablet; Refill: 2  -     methylPREDNISolone (MEDROL DOSEPACK) 4 mg tablet; use as directed  Dispense: 1 each; Refill: 0    Anxiety  -     ALPRAZolam (XANAX) 0.5 MG tablet; Take 1 tablet (0.5 mg total) by mouth 3 (three) times daily.  Dispense: 90 tablet; Refill: 0    Postmenopausal  -     DXA Bone Density Spine And Hip; Future; Expected date: 06/10/2022          Follow up as needed    Odessa Julio MD  [facility]   Family Medicine

## 2024-04-11 NOTE — TELEPHONE ENCOUNTER
----- Message from Jing Brian sent at 4/11/2024 10:33 AM EDT -----  Regarding: [facility] Referral Request  [facility] Referral Request    Reason for referral request: Lab/Test Order    Specialist/Lab/Test patient is requesting (if known):Blood Work      Additional Information : Patient has history of Thyroid ,she is asking if she need any blood work to be done before her annual visit on 4/18/2024  --------------------------------------------------------------------------------------------------------------------------    Relationship to Patient: Self     Call Back Information: OK to leave message on voicemail  Preferred Call Back Number: Phone [phone]

## 2024-04-15 ASSESSMENT — PATIENT HEALTH QUESTIONNAIRE - PHQ9
2. FEELING DOWN, DEPRESSED OR HOPELESS: NOT AT ALL
SUM OF ALL RESPONSES TO PHQ QUESTIONS 1-9: 0
SUM OF ALL RESPONSES TO PHQ QUESTIONS 1-9: 0
SUM OF ALL RESPONSES TO PHQ9 QUESTIONS 1 & 2: 0
SUM OF ALL RESPONSES TO PHQ QUESTIONS 1-9: 0
SUM OF ALL RESPONSES TO PHQ9 QUESTIONS 1 & 2: 0
1. LITTLE INTEREST OR PLEASURE IN DOING THINGS: NOT AT ALL
1. LITTLE INTEREST OR PLEASURE IN DOING THINGS: NOT AT ALL
2. FEELING DOWN, DEPRESSED OR HOPELESS: NOT AT ALL
SUM OF ALL RESPONSES TO PHQ QUESTIONS 1-9: 0

## 2024-04-16 NOTE — PROGRESS NOTES
Subjective:      Patient ID: Sarah Thapa is a 63 y.o. female.    HPI  COMPLETE PHYSICAL:    Patient is here today for a complete physical. She is feeling well and is fasting for labs.      Hypothyroidism:  Patient is tolerating and compliant with Synthroid 112 mcg daily.  She is due today for a TSH recheck.       Hyperlipidemia:  Patient takes Fish Oil daily.  She is fasting today for a lipid recheck.          Psoriatic Arthritis:  Patient sees Dr. Mitchell and is on Otezla BID, and Mobic 15 mg daily.      Allergic Rhinitis:  Patient takes Zyrtec 10 mg daily as needed and feels that the medication works well to control her allergy symptoms.       Allergies   Allergen Reactions    Adhesive Tape Rash    Percocet [Oxycodone-Acetaminophen] Itching and Rash    Benzamide Derivatives Other (See Comments)     From nasal spray-instant migraine headache,in contact solution-red,itchy eye    Sulfasalazine Nausea Only     Pt states she also had some fatigue with this med     Current Outpatient Medications   Medication Sig Dispense Refill    levothyroxine (SYNTHROID) 112 MCG tablet TAKE 1 TABLET DAILY 90 tablet 2    Omega-3 Fatty Acids (FISH OIL PO) Take by mouth      OTEZLA 30 MG TABS 2 times daily       meloxicam (MOBIC) 15 MG tablet Take 1 tablet by mouth daily      cetirizine (ZYRTEC) 10 MG tablet Take 1 tablet by mouth as needed      Multiple Vitamins-Minerals (MULTIVITAMIN ADULT PO) Take 1 tablet by mouth daily      aspirin 81 MG EC tablet Take 1 tablet by mouth daily       No current facility-administered medications for this visit.     Past Medical History:   Diagnosis Date    Arthralgia of hand, right     Chronic seasonal allergic rhinitis due to pollen     Edema of both legs     Hyperlipidemia, mixed     past hx BORDERLINE    Hypothyroidism (acquired)     Non morbid obesity due to excess calories     Psoriasis (a type of skin inflammation)     Psoriatic arthritis (HCC)      Past Surgical History:   Procedure

## 2024-04-18 ENCOUNTER — OFFICE VISIT (OUTPATIENT)
Dept: FAMILY MEDICINE CLINIC | Age: 64
End: 2024-04-18
Payer: COMMERCIAL

## 2024-04-18 VITALS
DIASTOLIC BLOOD PRESSURE: 80 MMHG | WEIGHT: 217 LBS | HEIGHT: 68 IN | SYSTOLIC BLOOD PRESSURE: 120 MMHG | BODY MASS INDEX: 32.89 KG/M2

## 2024-04-18 DIAGNOSIS — Z00.00 WELL ADULT EXAM: Primary | ICD-10-CM

## 2024-04-18 DIAGNOSIS — Z00.00 PREVENTATIVE HEALTH CARE: ICD-10-CM

## 2024-04-18 DIAGNOSIS — E78.2 HYPERLIPIDEMIA, MIXED: ICD-10-CM

## 2024-04-18 DIAGNOSIS — J30.1 CHRONIC SEASONAL ALLERGIC RHINITIS DUE TO POLLEN: ICD-10-CM

## 2024-04-18 DIAGNOSIS — E03.9 HYPOTHYROIDISM (ACQUIRED): ICD-10-CM

## 2024-04-18 DIAGNOSIS — L40.50 PSORIATIC ARTHRITIS (HCC): ICD-10-CM

## 2024-04-18 LAB
ALT SERPL-CCNC: 22 U/L (ref 10–40)
ANION GAP SERPL CALCULATED.3IONS-SCNC: 13 MMOL/L (ref 3–16)
AST SERPL-CCNC: 22 U/L (ref 15–37)
BUN SERPL-MCNC: 21 MG/DL (ref 7–20)
CALCIUM SERPL-MCNC: 10.3 MG/DL (ref 8.3–10.6)
CHLORIDE SERPL-SCNC: 104 MMOL/L (ref 99–110)
CHOLEST SERPL-MCNC: 229 MG/DL (ref 0–199)
CO2 SERPL-SCNC: 24 MMOL/L (ref 21–32)
CREAT SERPL-MCNC: 1 MG/DL (ref 0.6–1.2)
DEPRECATED RDW RBC AUTO: 13.2 % (ref 12.4–15.4)
GFR SERPLBLD CREATININE-BSD FMLA CKD-EPI: 63 ML/MIN/{1.73_M2}
GLUCOSE SERPL-MCNC: 96 MG/DL (ref 70–99)
HCT VFR BLD AUTO: 37.9 % (ref 36–48)
HDLC SERPL-MCNC: 39 MG/DL (ref 40–60)
HGB BLD-MCNC: 12.9 G/DL (ref 12–16)
LDLC SERPL CALC-MCNC: 153 MG/DL
MCH RBC QN AUTO: 31 PG (ref 26–34)
MCHC RBC AUTO-ENTMCNC: 34 G/DL (ref 31–36)
MCV RBC AUTO: 91.2 FL (ref 80–100)
PLATELET # BLD AUTO: 309 K/UL (ref 135–450)
PMV BLD AUTO: 8.2 FL (ref 5–10.5)
POTASSIUM SERPL-SCNC: 4.1 MMOL/L (ref 3.5–5.1)
RBC # BLD AUTO: 4.15 M/UL (ref 4–5.2)
SODIUM SERPL-SCNC: 141 MMOL/L (ref 136–145)
TRIGL SERPL-MCNC: 183 MG/DL (ref 0–150)
TSH SERPL DL<=0.005 MIU/L-ACNC: 4.22 UIU/ML (ref 0.27–4.2)
VLDLC SERPL CALC-MCNC: 37 MG/DL
WBC # BLD AUTO: 7.8 K/UL (ref 4–11)

## 2024-04-18 PROCEDURE — 99396 PREV VISIT EST AGE 40-64: CPT | Performed by: FAMILY MEDICINE

## 2024-04-18 RX ORDER — LEVOTHYROXINE SODIUM 112 UG/1
112 TABLET ORAL DAILY
Qty: 90 TABLET | Refills: 3 | Status: SHIPPED | OUTPATIENT
Start: 2024-04-18

## 2024-04-18 ASSESSMENT — ENCOUNTER SYMPTOMS
VOMITING: 0
COUGH: 1
BLOOD IN STOOL: 0
SORE THROAT: 0
DIARRHEA: 0
WHEEZING: 0
SHORTNESS OF BREATH: 0
ABDOMINAL PAIN: 0
RHINORRHEA: 0
CONSTIPATION: 0
NAUSEA: 0

## 2024-07-07 ENCOUNTER — APPOINTMENT (OUTPATIENT)
Dept: CT IMAGING | Age: 64
DRG: 264 | End: 2024-07-07
Payer: COMMERCIAL

## 2024-07-07 ENCOUNTER — HOSPITAL ENCOUNTER (INPATIENT)
Age: 64
LOS: 1 days | Discharge: HOME | DRG: 264 | End: 2024-07-10
Attending: HOSPITALIST | Admitting: HOSPITALIST
Payer: COMMERCIAL

## 2024-07-07 ENCOUNTER — APPOINTMENT (OUTPATIENT)
Dept: GENERAL RADIOLOGY | Age: 64
DRG: 264 | End: 2024-07-07
Payer: COMMERCIAL

## 2024-07-07 DIAGNOSIS — R07.9 CHEST PAIN, UNSPECIFIED TYPE: ICD-10-CM

## 2024-07-07 DIAGNOSIS — R91.8 LUNG NODULES: ICD-10-CM

## 2024-07-07 DIAGNOSIS — R59.9 ADENOPATHY: ICD-10-CM

## 2024-07-07 DIAGNOSIS — R07.9 CHEST PAIN WITH HIGH RISK OF ACUTE CORONARY SYNDROME: Primary | ICD-10-CM

## 2024-07-07 LAB
ALBUMIN SERPL-MCNC: 3.9 G/DL (ref 3.4–5)
ALBUMIN/GLOB SERPL: 1.1 {RATIO} (ref 1.1–2.2)
ALP SERPL-CCNC: 77 U/L (ref 40–129)
ALT SERPL-CCNC: 21 U/L (ref 10–40)
ANION GAP SERPL CALCULATED.3IONS-SCNC: 12 MMOL/L (ref 3–16)
AST SERPL-CCNC: 22 U/L (ref 15–37)
BASOPHILS # BLD: 0.1 K/UL (ref 0–0.2)
BASOPHILS NFR BLD: 0.9 %
BILIRUB SERPL-MCNC: 0.4 MG/DL (ref 0–1)
BUN SERPL-MCNC: 18 MG/DL (ref 7–20)
CALCIUM SERPL-MCNC: 10.4 MG/DL (ref 8.3–10.6)
CHLORIDE SERPL-SCNC: 106 MMOL/L (ref 99–110)
CO2 SERPL-SCNC: 23 MMOL/L (ref 21–32)
CREAT SERPL-MCNC: 1.1 MG/DL (ref 0.6–1.2)
D-DIMER QUANTITATIVE: 1.32 UG/ML FEU (ref 0–0.6)
DEPRECATED RDW RBC AUTO: 13.1 % (ref 12.4–15.4)
EOSINOPHIL # BLD: 0.3 K/UL (ref 0–0.6)
EOSINOPHIL NFR BLD: 4.1 %
GFR SERPLBLD CREATININE-BSD FMLA CKD-EPI: 56 ML/MIN/{1.73_M2}
GLUCOSE SERPL-MCNC: 85 MG/DL (ref 70–99)
HCT VFR BLD AUTO: 37.4 % (ref 36–48)
HGB BLD-MCNC: 12.5 G/DL (ref 12–16)
LYMPHOCYTES # BLD: 1.7 K/UL (ref 1–5.1)
LYMPHOCYTES NFR BLD: 19.6 %
MAGNESIUM SERPL-MCNC: 2.1 MG/DL (ref 1.8–2.4)
MCH RBC QN AUTO: 30.1 PG (ref 26–34)
MCHC RBC AUTO-ENTMCNC: 33.4 G/DL (ref 31–36)
MCV RBC AUTO: 90.1 FL (ref 80–100)
MONOCYTES # BLD: 1.2 K/UL (ref 0–1.3)
MONOCYTES NFR BLD: 14.5 %
NEUTROPHILS # BLD: 5.2 K/UL (ref 1.7–7.7)
NEUTROPHILS NFR BLD: 60.9 %
PLATELET # BLD AUTO: 305 K/UL (ref 135–450)
PMV BLD AUTO: 7.9 FL (ref 5–10.5)
POTASSIUM SERPL-SCNC: 3.4 MMOL/L (ref 3.5–5.1)
PROT SERPL-MCNC: 7.6 G/DL (ref 6.4–8.2)
RBC # BLD AUTO: 4.15 M/UL (ref 4–5.2)
SODIUM SERPL-SCNC: 141 MMOL/L (ref 136–145)
TROPONIN, HIGH SENSITIVITY: 11 NG/L (ref 0–14)
TROPONIN, HIGH SENSITIVITY: 11 NG/L (ref 0–14)
TSH SERPL DL<=0.005 MIU/L-ACNC: 2.96 UIU/ML (ref 0.27–4.2)
WBC # BLD AUTO: 8.5 K/UL (ref 4–11)

## 2024-07-07 PROCEDURE — 84443 ASSAY THYROID STIM HORMONE: CPT

## 2024-07-07 PROCEDURE — 93005 ELECTROCARDIOGRAM TRACING: CPT | Performed by: HOSPITALIST

## 2024-07-07 PROCEDURE — 71046 X-RAY EXAM CHEST 2 VIEWS: CPT

## 2024-07-07 PROCEDURE — 99285 EMERGENCY DEPT VISIT HI MDM: CPT

## 2024-07-07 PROCEDURE — 2580000003 HC RX 258: Performed by: HOSPITALIST

## 2024-07-07 PROCEDURE — G0378 HOSPITAL OBSERVATION PER HR: HCPCS

## 2024-07-07 PROCEDURE — 84484 ASSAY OF TROPONIN QUANT: CPT

## 2024-07-07 PROCEDURE — 85379 FIBRIN DEGRADATION QUANT: CPT

## 2024-07-07 PROCEDURE — 74174 CTA ABD&PLVS W/CONTRAST: CPT

## 2024-07-07 PROCEDURE — 80053 COMPREHEN METABOLIC PANEL: CPT

## 2024-07-07 PROCEDURE — 85025 COMPLETE CBC W/AUTO DIFF WBC: CPT

## 2024-07-07 PROCEDURE — 36415 COLL VENOUS BLD VENIPUNCTURE: CPT

## 2024-07-07 PROCEDURE — 6370000000 HC RX 637 (ALT 250 FOR IP): Performed by: HOSPITALIST

## 2024-07-07 PROCEDURE — 6360000004 HC RX CONTRAST MEDICATION: Performed by: PHYSICIAN ASSISTANT

## 2024-07-07 PROCEDURE — 83735 ASSAY OF MAGNESIUM: CPT

## 2024-07-07 PROCEDURE — 96374 THER/PROPH/DIAG INJ IV PUSH: CPT

## 2024-07-07 PROCEDURE — 96372 THER/PROPH/DIAG INJ SC/IM: CPT

## 2024-07-07 PROCEDURE — 6360000002 HC RX W HCPCS: Performed by: HOSPITALIST

## 2024-07-07 PROCEDURE — 6370000000 HC RX 637 (ALT 250 FOR IP): Performed by: PHYSICIAN ASSISTANT

## 2024-07-07 PROCEDURE — 1200000000 HC SEMI PRIVATE

## 2024-07-07 RX ORDER — METOPROLOL TARTRATE 25 MG/1
25 TABLET, FILM COATED ORAL 2 TIMES DAILY
Status: DISCONTINUED | OUTPATIENT
Start: 2024-07-07 | End: 2024-07-10 | Stop reason: HOSPADM

## 2024-07-07 RX ORDER — MAGNESIUM SULFATE IN WATER 40 MG/ML
2000 INJECTION, SOLUTION INTRAVENOUS PRN
Status: DISCONTINUED | OUTPATIENT
Start: 2024-07-07 | End: 2024-07-10 | Stop reason: HOSPADM

## 2024-07-07 RX ORDER — ASPIRIN 81 MG/1
324 TABLET, CHEWABLE ORAL ONCE
Status: COMPLETED | OUTPATIENT
Start: 2024-07-07 | End: 2024-07-07

## 2024-07-07 RX ORDER — IOPAMIDOL 755 MG/ML
75 INJECTION, SOLUTION INTRAVASCULAR
Status: COMPLETED | OUTPATIENT
Start: 2024-07-07 | End: 2024-07-07

## 2024-07-07 RX ORDER — SODIUM CHLORIDE 0.9 % (FLUSH) 0.9 %
5-40 SYRINGE (ML) INJECTION PRN
Status: DISCONTINUED | OUTPATIENT
Start: 2024-07-07 | End: 2024-07-10 | Stop reason: HOSPADM

## 2024-07-07 RX ORDER — ONDANSETRON 2 MG/ML
4 INJECTION INTRAMUSCULAR; INTRAVENOUS EVERY 6 HOURS PRN
Status: DISCONTINUED | OUTPATIENT
Start: 2024-07-07 | End: 2024-07-10 | Stop reason: HOSPADM

## 2024-07-07 RX ORDER — LEVOTHYROXINE SODIUM 112 UG/1
112 TABLET ORAL
Status: DISCONTINUED | OUTPATIENT
Start: 2024-07-08 | End: 2024-07-10 | Stop reason: HOSPADM

## 2024-07-07 RX ORDER — SODIUM CHLORIDE 0.9 % (FLUSH) 0.9 %
5-40 SYRINGE (ML) INJECTION EVERY 12 HOURS SCHEDULED
Status: DISCONTINUED | OUTPATIENT
Start: 2024-07-07 | End: 2024-07-10 | Stop reason: HOSPADM

## 2024-07-07 RX ORDER — HYDRALAZINE HYDROCHLORIDE 20 MG/ML
5 INJECTION INTRAMUSCULAR; INTRAVENOUS EVERY 4 HOURS PRN
Status: DISCONTINUED | OUTPATIENT
Start: 2024-07-07 | End: 2024-07-10 | Stop reason: HOSPADM

## 2024-07-07 RX ORDER — ONDANSETRON 4 MG/1
4 TABLET, ORALLY DISINTEGRATING ORAL EVERY 8 HOURS PRN
Status: DISCONTINUED | OUTPATIENT
Start: 2024-07-07 | End: 2024-07-10 | Stop reason: HOSPADM

## 2024-07-07 RX ORDER — ATORVASTATIN CALCIUM 40 MG/1
40 TABLET, FILM COATED ORAL NIGHTLY
Status: DISCONTINUED | OUTPATIENT
Start: 2024-07-07 | End: 2024-07-10 | Stop reason: HOSPADM

## 2024-07-07 RX ORDER — ENOXAPARIN SODIUM 100 MG/ML
40 INJECTION SUBCUTANEOUS NIGHTLY
Status: DISCONTINUED | OUTPATIENT
Start: 2024-07-07 | End: 2024-07-10 | Stop reason: HOSPADM

## 2024-07-07 RX ORDER — SODIUM CHLORIDE 9 MG/ML
INJECTION, SOLUTION INTRAVENOUS PRN
Status: DISCONTINUED | OUTPATIENT
Start: 2024-07-07 | End: 2024-07-10 | Stop reason: HOSPADM

## 2024-07-07 RX ORDER — POLYETHYLENE GLYCOL 3350 17 G/17G
17 POWDER, FOR SOLUTION ORAL DAILY PRN
Status: DISCONTINUED | OUTPATIENT
Start: 2024-07-07 | End: 2024-07-10 | Stop reason: HOSPADM

## 2024-07-07 RX ORDER — ASPIRIN 81 MG/1
81 TABLET, CHEWABLE ORAL DAILY
Status: DISCONTINUED | OUTPATIENT
Start: 2024-07-08 | End: 2024-07-10

## 2024-07-07 RX ORDER — POTASSIUM CHLORIDE 1500 MG/1
40 TABLET, EXTENDED RELEASE ORAL PRN
Status: DISCONTINUED | OUTPATIENT
Start: 2024-07-07 | End: 2024-07-10 | Stop reason: HOSPADM

## 2024-07-07 RX ORDER — LISINOPRIL 20 MG/1
20 TABLET ORAL DAILY
Status: DISCONTINUED | OUTPATIENT
Start: 2024-07-07 | End: 2024-07-10 | Stop reason: HOSPADM

## 2024-07-07 RX ORDER — POTASSIUM CHLORIDE 7.45 MG/ML
10 INJECTION INTRAVENOUS PRN
Status: DISCONTINUED | OUTPATIENT
Start: 2024-07-07 | End: 2024-07-10 | Stop reason: HOSPADM

## 2024-07-07 RX ADMIN — ENOXAPARIN SODIUM 40 MG: 100 INJECTION SUBCUTANEOUS at 20:55

## 2024-07-07 RX ADMIN — ASPIRIN 81 MG 324 MG: 81 TABLET ORAL at 11:35

## 2024-07-07 RX ADMIN — METOPROLOL TARTRATE 25 MG: 25 TABLET, FILM COATED ORAL at 20:52

## 2024-07-07 RX ADMIN — POTASSIUM BICARBONATE 20 MEQ: 782 TABLET, EFFERVESCENT ORAL at 13:01

## 2024-07-07 RX ADMIN — SODIUM CHLORIDE, PRESERVATIVE FREE 10 ML: 5 INJECTION INTRAVENOUS at 20:58

## 2024-07-07 RX ADMIN — HYDRALAZINE HYDROCHLORIDE 5 MG: 20 INJECTION INTRAMUSCULAR; INTRAVENOUS at 16:25

## 2024-07-07 RX ADMIN — IOPAMIDOL 75 ML: 755 INJECTION, SOLUTION INTRAVENOUS at 13:33

## 2024-07-07 RX ADMIN — ATORVASTATIN CALCIUM 40 MG: 40 TABLET, FILM COATED ORAL at 20:51

## 2024-07-07 RX ADMIN — LISINOPRIL 20 MG: 20 TABLET ORAL at 17:32

## 2024-07-07 ASSESSMENT — PAIN SCALES - GENERAL
PAINLEVEL_OUTOF10: 3
PAINLEVEL_OUTOF10: 2
PAINLEVEL_OUTOF10: 3

## 2024-07-07 ASSESSMENT — LIFESTYLE VARIABLES
HOW OFTEN DO YOU HAVE A DRINK CONTAINING ALCOHOL: NEVER
HOW MANY STANDARD DRINKS CONTAINING ALCOHOL DO YOU HAVE ON A TYPICAL DAY: PATIENT DOES NOT DRINK

## 2024-07-07 ASSESSMENT — PAIN DESCRIPTION - ORIENTATION
ORIENTATION: MID
ORIENTATION: MID

## 2024-07-07 ASSESSMENT — PAIN DESCRIPTION - PAIN TYPE
TYPE: ACUTE PAIN
TYPE: ACUTE PAIN

## 2024-07-07 ASSESSMENT — PAIN - FUNCTIONAL ASSESSMENT: PAIN_FUNCTIONAL_ASSESSMENT: 0-10

## 2024-07-07 ASSESSMENT — PAIN DESCRIPTION - DESCRIPTORS
DESCRIPTORS: ACHING;DULL
DESCRIPTORS: DISCOMFORT;TIGHTNESS

## 2024-07-07 ASSESSMENT — HEART SCORE: ECG: NORMAL

## 2024-07-07 ASSESSMENT — PAIN DESCRIPTION - FREQUENCY: FREQUENCY: INTERMITTENT

## 2024-07-07 ASSESSMENT — PAIN DESCRIPTION - LOCATION
LOCATION: CHEST
LOCATION: HEAD

## 2024-07-07 NOTE — PROGRESS NOTES
Patient arrived room 4108 from ED. Ambulated to bed with steady gait.  Denies chest pain. Placed on telemetry NSR . BP high , orders for prn hydralazine.

## 2024-07-07 NOTE — H&P
Hospital Medicine History & Physical      PCP: Marvel Arceo DO    Date of Admission: 7/7/2024    Date of Service: Pt seen/examined on 7/7/24 and Placed in Observation.    Chief Complaint: Chest pain      History Of Present Illness:      63 y.o. female with history of psoriatic arthritis, hypothyroidism, hyperlipidemia presents emergency room with chest pain.  The patient has been having intermittent episodes of substernal chest pain/pressure over the past several months but was recently..  Pain across both at rest and with exertion... She had an episode last night that woke her up from sleep as well as this morning lasting 30 minutes prompting her to come to the ED..  She has no associated fevers, chills or rigors.  No palpitations, dyspnea.  Endorses ongoing fatigue for the past 1 month..  No fevers, night sweats, weight loss  In the ED, she was noted to have elevated blood pressures SBP 150s to 180s..  No prior history of hypertension  EKG and troponins negative for ischemia, chest x-ray was clear  CTA chest showed no PE/pneumonia, very subtle apical nodular infiltrate      Noted to have epicardial, angel hepatis and retroperitoneal lymphadenopathy plus splenomegaly      Past Medical History:          Diagnosis Date    Arthralgia of hand, right     Chronic seasonal allergic rhinitis due to pollen     Edema of both legs     Hyperlipidemia, mixed     past hx BORDERLINE    Hypothyroidism (acquired)     Non morbid obesity due to excess calories     Psoriasis (a type of skin inflammation)     Psoriatic arthritis (HCC)        Past Surgical History:          Procedure Laterality Date    ACHILLES TENDON SURGERY Left 2/2/2021    EXCISION POSTERIOR CALCANEAL EXOSTOSIS LEFT, DEBRIDEMENT AND REPAIR LEFT ACHILLES performed by Austin Duarte DPM at [facility] OR    BRONCHOSCOPY  06/05/2017    CHOLECYSTECTOMY, LAPAROSCOPIC  10/12/2016    with milena / Dr. Archibald.     FOOT SURGERY Left     Left cyst removal    HEEL SPUR  Pressure Father     Cancer Father         Liver    High Cholesterol Father     Asthma Sister     Lupus Sister     Heart Disease Brother         Atrial Fibrillation    High Blood Pressure Brother     Alcohol Abuse Brother     Diabetes Brother     High Blood Pressure Brother     High Blood Pressure Brother     Heart Disease Maternal Grandmother         Pacemaker    Thyroid Disease Paternal Grandmother         Hypothyroid    Diabetes Paternal Grandfather        REVIEW OF SYSTEMS COMPLETED:   Pertinent positives as noted in the HPI. All other systems reviewed and negative.    PHYSICAL EXAM PERFORMED:    BP (!) 182/99   Pulse 77   Temp 98.4 °F (36.9 °C) (Oral)   Resp 17   Ht 1.715 m (5' 7.5\")   Wt 94.3 kg (207 lb 14.3 oz)   SpO2 95%   BMI 32.08 kg/m²     General appearance:  No apparent distress, appears stated age and cooperative.  HEENT:  Normal cephalic, atraumatic without obvious deformity. Pupils equal, round, and reactive to light.  Extra ocular muscles intact. Conjunctivae/corneas clear.  Neck: Supple, with full range of motion. No jugular venous distention. Trachea midline.  Respiratory:  Normal respiratory effort. Clear to auscultation, bilaterally without Rales/Wheezes/Rhonchi.  Cardiovascular:  Regular rate and rhythm with normal S1/S2 without murmurs, rubs or gallops.  Abdomen: Soft, non-tender, non-distended with normal bowel sounds.  Musculoskeletal:  No clubbing, cyanosis or edema bilaterally.  Full range of motion without deformity.  Neurologic:  Neurovascularly intact without any focal sensory/motor deficits. Cranial nerves: II-XII intact, grossly non-focal.  Psychiatric:  Alert and oriented, thought content appropriate, normal insight  Capillary Refill: Brisk,3 seconds, normal  Peripheral Pulses: +2 palpable, equal bilaterally       Labs:     Recent Labs     07/07/24  1126   WBC 8.5   HGB 12.5   HCT 37.4        Recent Labs     07/07/24  1126      K 3.4*      CO2 23   BUN 18  hematology/oncology    Psoriatic arthritis--on Otezla/meloxicam    Hypothyroidism--on Synthroid-obtain TSH          DVT Prophylaxis: Lovenox  Diet: Regular, n.p.o. to midnight  Code Status: Full jewell,          Daniel Abraham MD    Thank you Marvel Arceo DO for the opportunity to be involved in this patient's care. If you have any questions or concerns please feel free to contact me at [phone].    Comment: Please note this report has been produced using speech recognition software and may contain errors related to that system including errors in grammar, punctuation, and spelling, as well as words and phrases that may be inappropriate. If there are any questions or concerns, please feel free to contact the dictating provider for clarification.

## 2024-07-07 NOTE — ED PROVIDER NOTES
lymphoma.   4. Splenomegaly with diffusely heterogeneous attenuation.   5. Sigmoid and colonic diverticulosis.  No evidence of diverticulitis.   6. Focal luminal narrowing of the celiac trunk at its origin.  Please   correlate with clinical symptoms of arcuate ligament syndrome.         XR CHEST (2 VW)   Final Result   No acute pulmonary findings.           XR CHEST (2 VW)    Result Date: 7/7/2024  EXAMINATION: TWO XRAY VIEWS OF THE CHEST 7/7/2024 11:13 am COMPARISON: None. HISTORY: ORDERING SYSTEM PROVIDED HISTORY: Chest Pain TECHNOLOGIST PROVIDED HISTORY: \"IF\" patient is in hallway or waiting room. Reason for exam:->Chest Pain Reason for Exam: pain FINDINGS: Lungs: Clear. Pleura: No effusion or pneumothorax. Cardiomediastinal silhouette: Tortuosity of the thoracic aorta. Normal heart size. Bones: No acute bony findings.  Anchors are noted at the left glenoid. Soft tissues: Normal.     No acute pulmonary findings.       No results found.    PROCEDURES   Unless otherwise noted below, none     Procedures    CRITICAL CARE TIME (.cctime)       PAST MEDICAL HISTORY      has a past medical history of Arthralgia of hand, right, Chronic seasonal allergic rhinitis due to pollen, Edema of both legs, Hyperlipidemia, mixed, Hypothyroidism (acquired), Non morbid obesity due to excess calories, Psoriasis (a type of skin inflammation), and Psoriatic arthritis (HCC).     Chronic Conditions affecting Care:     EMERGENCY DEPARTMENT COURSE and DIFFERENTIAL DIAGNOSIS/MDM:   Vitals:    Vitals:    07/07/24 1057 07/07/24 1315   BP: (!) 158/77 (!) 182/99   Pulse: 82 77   Resp: 17 17   Temp: 98.4 °F (36.9 °C)    TempSrc: Oral    SpO2: 95%    Weight: 94.3 kg (207 lb 14.3 oz)    Height: 1.715 m (5' 7.5\")        Patient was given the following medications:  Medications   aspirin chewable tablet 324 mg (324 mg Oral Given 7/7/24 1135)   potassium bicarb-citric acid (EFFER-K) effervescent tablet 20 mEq (20 mEq Oral Given 7/7/24 1301)   iopamidol  sensitive troponin x 2 within normal range, D-dimer was obtained as patient endorsed chest pain and her chest x-ray showed slightly tortuous aorta which is new for her, her D-dimer is positive therefore she had CTA chest abdomen pelvis with contrast which does not show any evidence of thoracic aorta intramural hematoma dissection or aneurysm.  Incidentally there are multiple 2 to 3 mm centrilobular and tree-in-bud nodules in the upper lobes of the lungs to favor inflammatory versus infectious etiology I have low concern overall for infectious source as she is without cough or other a irritation to the lungs.  I favor inflammatory etiology.  She also was noted to have epicardial angel hepatic and retroperitoneal adenopathy, she does not have a known primary malignancy, lymphoma remains in differential per radiology.  Additionally she has splenomegaly with diffusely heterogeneous attenuation, sigmoid and colonic diverticulosis without evidence of diverticulitis and focal luminal narrowing of the celiac trunk at its origin however she has no clinical symptoms of arcuate ligament syndrome.  Her EKG was obtained and is normal.  Ultimately, patient has a heart score of 4 I recommend that she be admitted for continued chest pain rule out and also for workup of the concern for her adenopathy, concern for lymphoma  which could be the source of her progressive fatigue.     Disposition Considerations (include 1 Tests not done, Shared Decision Making, Pt Expectation of Test or Tx.): All information including ED workup, results, treatment, diagnosis has been reviewed and discussed  with Hospitalist who is the admitting physician. Pt will be admitted in stable condition. Pt advised of admission and is in full agreement.          I am the Primary Clinician of Record.    FINAL IMPRESSION      1. Chest pain with high risk of acute coronary syndrome    2. Adenopathy    3. Lung nodules          DISPOSITION/PLAN     DISPOSITION  Decision To Admit 07/07/2024 02:35:15 PM      PATIENT REFERRED TO:  No follow-up provider specified.    DISCHARGE MEDICATIONS:  New Prescriptions    No medications on file       DISCONTINUED MEDICATIONS:  Discontinued Medications    No medications on file              (Please note that portions of this note were completed with a voice recognition program.  Efforts were made to edit the dictations but occasionally words are mis-transcribed.)    RAJEEV Wolf (electronically signed)            Molly Beltrán PA  07/07/24 5949

## 2024-07-07 NOTE — ED TRIAGE NOTES
Patient arrived to the ED ambulatory from home w/ complaints of chest pain.     Patient/EMS reports states Woke her up out of her sleep @ 0430 w/ a mid sternal tightness; reports that it only lasted about 30 minutes. Currently just a little tight in the chest during triage. No SOB or N/V/D. Reports this has been going on over the past couple months and has a PCP next week for this but was told that if she had a chest pain feeling to come to ED for sooner eval. Reports a-fib and pacemakers needs run in her family but she has never been DX w/ anything cardiac related.    Patient A&O x 4, VSS w/ exception of elevated BP (does have known HX HTN),

## 2024-07-07 NOTE — ED NOTES
Pt arrived from [facility] via wheelchair. Introduced self to pt and placed pt on cardiac monitor. Instructed pt on how to use call light and left within reach. Pt denies further needs at this time.

## 2024-07-07 NOTE — PROGRESS NOTES
4 Eyes Skin Assessment     NAME:  Sarah Thapa  YOB: 1960  MEDICAL RECORD NUMBER:  [id number]    The patient is being assessed for  Admission    I agree that at least one RN has performed a thorough Head to Toe Skin Assessment on the patient. ALL assessment sites listed below have been assessed.      Areas assessed by both nurses:    Head, Face, Ears, Shoulders, Back, Chest, Arms, Elbows, Hands, Sacrum. Buttock, Coccyx, Ischium, Legs. Feet and Heels, and Under Medical Devices         Does the Patient have a Wound? No noted wound(s)       Roshan Prevention initiated by RN: No  Wound Care Orders initiated by RN: No    Pressure Injury (Stage 3,4, Unstageable, DTI, NWPT, and Complex wounds) if present, place Wound referral order by RN under : No    New Ostomies, if present place, Ostomy referral order under : No     Nurse 1 eSignature: Electronically signed by Shavonne Canseco RN on 7/7/24 at 5:36 PM EDT    **SHARE this note so that the co-signing nurse can place an eSignature**    Nurse 2 eSignature: {Esignature:[id number]}

## 2024-07-08 ENCOUNTER — HOSPITAL ENCOUNTER (INPATIENT)
Age: 64
Discharge: HOME | DRG: 264 | End: 2024-07-10
Payer: COMMERCIAL

## 2024-07-08 ENCOUNTER — APPOINTMENT (OUTPATIENT)
Dept: NUCLEAR MEDICINE | Age: 64
DRG: 264 | End: 2024-07-08
Payer: COMMERCIAL

## 2024-07-08 LAB
CREAT SERPL-MCNC: 1 MG/DL (ref 0.6–1.2)
DEPRECATED RDW RBC AUTO: 13.3 % (ref 12.4–15.4)
ECHO BSA: 2.12 M2
EKG ATRIAL RATE: 76 BPM
EKG DIAGNOSIS: NORMAL
EKG P AXIS: 34 DEGREES
EKG P-R INTERVAL: 132 MS
EKG Q-T INTERVAL: 378 MS
EKG QRS DURATION: 82 MS
EKG QTC CALCULATION (BAZETT): 425 MS
EKG R AXIS: 9 DEGREES
EKG T AXIS: 14 DEGREES
EKG VENTRICULAR RATE: 76 BPM
GFR SERPLBLD CREATININE-BSD FMLA CKD-EPI: 63 ML/MIN/{1.73_M2}
HCT VFR BLD AUTO: 34 % (ref 36–48)
HGB BLD-MCNC: 11.6 G/DL (ref 12–16)
MCH RBC QN AUTO: 30.5 PG (ref 26–34)
MCHC RBC AUTO-ENTMCNC: 34.1 G/DL (ref 31–36)
MCV RBC AUTO: 89.3 FL (ref 80–100)
NUC REST EJECTION FRACTION: 80 %
PLATELET # BLD AUTO: 268 K/UL (ref 135–450)
PMV BLD AUTO: 7.7 FL (ref 5–10.5)
RBC # BLD AUTO: 3.81 M/UL (ref 4–5.2)
STRESS ANGINA INDEX: 1
STRESS BASELINE DIAS BP: 86 MMHG
STRESS BASELINE HR: 84 BPM
STRESS BASELINE SYS BP: 135 MMHG
STRESS ESTIMATED WORKLOAD: 4.6 METS
STRESS EXERCISE DUR MIN: 3 MIN
STRESS EXERCISE DUR SEC: 10 SEC
STRESS PEAK DIAS BP: 110 MMHG
STRESS PEAK SYS BP: 197 MMHG
STRESS PERCENT HR ACHIEVED: 90 %
STRESS POST PEAK HR: 142 BPM
STRESS RATE PRESSURE PRODUCT: NORMAL BPM*MMHG
STRESS TARGET HR: 157 BPM
TID: 0.9
WBC # BLD AUTO: 7.9 K/UL (ref 4–11)

## 2024-07-08 PROCEDURE — 93018 CV STRESS TEST I&R ONLY: CPT | Performed by: INTERNAL MEDICINE

## 2024-07-08 PROCEDURE — 94760 N-INVAS EAR/PLS OXIMETRY 1: CPT

## 2024-07-08 PROCEDURE — 36415 COLL VENOUS BLD VENIPUNCTURE: CPT

## 2024-07-08 PROCEDURE — 2580000003 HC RX 258: Performed by: HOSPITALIST

## 2024-07-08 PROCEDURE — 1200000000 HC SEMI PRIVATE

## 2024-07-08 PROCEDURE — 78452 HT MUSCLE IMAGE SPECT MULT: CPT

## 2024-07-08 PROCEDURE — G0378 HOSPITAL OBSERVATION PER HR: HCPCS

## 2024-07-08 PROCEDURE — 82565 ASSAY OF CREATININE: CPT

## 2024-07-08 PROCEDURE — 85027 COMPLETE CBC AUTOMATED: CPT

## 2024-07-08 PROCEDURE — 6370000000 HC RX 637 (ALT 250 FOR IP): Performed by: HOSPITALIST

## 2024-07-08 PROCEDURE — A9502 TC99M TETROFOSMIN: HCPCS | Performed by: HOSPITALIST

## 2024-07-08 PROCEDURE — 3430000000 HC RX DIAGNOSTIC RADIOPHARMACEUTICAL: Performed by: HOSPITALIST

## 2024-07-08 PROCEDURE — 93016 CV STRESS TEST SUPVJ ONLY: CPT | Performed by: INTERNAL MEDICINE

## 2024-07-08 PROCEDURE — 78452 HT MUSCLE IMAGE SPECT MULT: CPT | Performed by: INTERNAL MEDICINE

## 2024-07-08 PROCEDURE — 93010 ELECTROCARDIOGRAM REPORT: CPT | Performed by: INTERNAL MEDICINE

## 2024-07-08 PROCEDURE — 93017 CV STRESS TEST TRACING ONLY: CPT

## 2024-07-08 RX ORDER — ACETAMINOPHEN 325 MG/1
650 TABLET ORAL EVERY 4 HOURS PRN
Status: DISCONTINUED | OUTPATIENT
Start: 2024-07-08 | End: 2024-07-10 | Stop reason: HOSPADM

## 2024-07-08 RX ADMIN — ATORVASTATIN CALCIUM 40 MG: 40 TABLET, FILM COATED ORAL at 20:35

## 2024-07-08 RX ADMIN — ACETAMINOPHEN 325MG 650 MG: 325 TABLET ORAL at 22:15

## 2024-07-08 RX ADMIN — LISINOPRIL 20 MG: 20 TABLET ORAL at 11:18

## 2024-07-08 RX ADMIN — TETROFOSMIN 12.1 MILLICURIE: 1.38 INJECTION, POWDER, LYOPHILIZED, FOR SOLUTION INTRAVENOUS at 07:00

## 2024-07-08 RX ADMIN — TETROFOSMIN 32.8 MILLICURIE: 1.38 INJECTION, POWDER, LYOPHILIZED, FOR SOLUTION INTRAVENOUS at 08:30

## 2024-07-08 RX ADMIN — METOPROLOL TARTRATE 25 MG: 25 TABLET, FILM COATED ORAL at 11:18

## 2024-07-08 RX ADMIN — LEVOTHYROXINE SODIUM 112 MCG: 0.11 TABLET ORAL at 05:15

## 2024-07-08 RX ADMIN — ASPIRIN 81 MG: 81 TABLET, CHEWABLE ORAL at 11:18

## 2024-07-08 RX ADMIN — METOPROLOL TARTRATE 25 MG: 25 TABLET, FILM COATED ORAL at 20:35

## 2024-07-08 RX ADMIN — ACETAMINOPHEN 325MG 650 MG: 325 TABLET ORAL at 12:38

## 2024-07-08 RX ADMIN — SODIUM CHLORIDE, PRESERVATIVE FREE 10 ML: 5 INJECTION INTRAVENOUS at 20:35

## 2024-07-08 ASSESSMENT — PAIN SCALES - GENERAL
PAINLEVEL_OUTOF10: 4
PAINLEVEL_OUTOF10: 0
PAINLEVEL_OUTOF10: 3

## 2024-07-08 ASSESSMENT — PAIN DESCRIPTION - LOCATION
LOCATION: HEAD
LOCATION: HEAD

## 2024-07-08 ASSESSMENT — PAIN DESCRIPTION - DESCRIPTORS
DESCRIPTORS: ACHING
DESCRIPTORS: ACHING

## 2024-07-08 ASSESSMENT — PAIN - FUNCTIONAL ASSESSMENT
PAIN_FUNCTIONAL_ASSESSMENT: ACTIVITIES ARE NOT PREVENTED
PAIN_FUNCTIONAL_ASSESSMENT: ACTIVITIES ARE NOT PREVENTED

## 2024-07-08 ASSESSMENT — PAIN DESCRIPTION - ORIENTATION: ORIENTATION: MID

## 2024-07-08 NOTE — PLAN OF CARE
Problem: Discharge Planning  Goal: Discharge to home or other facility with appropriate resources  7/8/2024 1140 by Brooke Perez, RN  Outcome: Progressing  7/7/2024 2157 by Ghazal Reaves, RN  Outcome: Progressing     Problem: Pain  Goal: Verbalizes/displays adequate comfort level or baseline comfort level  7/8/2024 1140 by Brooke Perez, RN  Outcome: Progressing  7/7/2024 2157 by Ghazal Reaves, RN  Outcome: Progressing

## 2024-07-08 NOTE — PROGRESS NOTES
V2.0    [facility] Progress Note      Name:  Sarah Thapa /Age/Sex: 1960  (63 y.o. female)   MRN & CSN:  [id number] & [id number] Encounter Date/Time: 2024 8:57 AM EDT   Location:  [id number]/4108-01 PCP: Marvel rAceo DO     Attending:Daniel Abraham MD       Hospital Day: 2      HPI :   Chief Complaint:     Sarah Thapa is a 63 y.o. female who presents with intermittent chest pain      Subjective:   No chest pain this morning.      Review of Systems:      Pertinent positives and negatives discussed in HPI    Objective:     Intake/Output Summary (Last 24 hours) at 2024 0840  Last data filed at 2024 0514  Gross per 24 hour   Intake 840 ml   Output --   Net 840 ml      Vitals:   Vitals:    24 1837 24 2045 24 0000 24 0400   BP: (!) 175/86 114/66 123/76 129/68   Pulse:  73 71 68   Resp: 18 17 20 17   Temp:  98.6 °F (37 °C) 98.9 °F (37.2 °C) 98.7 °F (37.1 °C)   TempSrc:  Oral Oral Oral   SpO2:  96% 95% 93%   Weight:    95.1 kg (209 lb 10.5 oz)   Height:             Physical Exam:      Physical Exam Performed:    /68   Pulse 68   Temp 98.7 °F (37.1 °C) (Oral)   Resp 17   Ht 1.715 m (5' 7.5\")   Wt 95.1 kg (209 lb 10.5 oz)   SpO2 93%   BMI 32.35 kg/m²     General appearance: No apparent distress, appears stated age and cooperative.  HEENT: Pupils equal, round, and reactive to light. Conjunctivae/corneas clear.  Neck: Supple, with full range of motion. No jugular venous distention. Trachea midline.  Respiratory:  Normal respiratory effort. Clear to auscultation, bilaterally without Rales/Wheezes/Rhonchi.  Cardiovascular: Regular rate and rhythm with normal S1/S2 without murmurs, rubs or gallops.  Abdomen: Soft, non-tender, non-distended with normal bowel sounds.  Musculoskeletal: No clubbing, cyanosis or edema bilaterally.  Full range of motion without deformity.  Neurologic:  Neurovascularly intact without any focal sensory/motor deficits. Cranial  are again demonstrated compatible with fibrotic lung process.  Overall this appears similar compared to the prior exam. Organs: The liver, pancreas, spleen, kidneys, and adrenals reveal no acute findings. No inflammatory change identified in the gallbladder fossa. GI/Bowel: Liquid stool is present throughout the colon.  The sigmoid colon takes an unusual course towards the right abdomen and there is mild swirling of the mesentery noted without twisting to suggest volvulus.  Loops of colon are mildly distended with gas.  No significant small bowel distension is appreciated.  No wall thickening or inflammatory change.  No pneumatosis or portal venous gas. Pelvis: Mild diffuse bladder wall thickening. Peritoneum/Retroperitoneum: No free air or free fluid.  The aorta is normal in caliber.  The visceral branches are patent. Calcified atheromatous plaque is present.  No lymphadenopathy. Bones/Soft Tissues: Small fat containing umbilical hernia.  Severe compression deformity of L2 is again noted.     1.  Findings compatible diarrheal process.  Mild colonic distension is noted with mild mesenteric swirling but no evidence for volvulus.  This may be due to a partial obstructing process due to an underlying internal hernia.  No significant small bowel distension. 2.  Additional chronic and benign findings, as described.       CBC:   Recent Labs     07/07/24  1126 07/08/24  0514   WBC 8.5 7.9   HGB 12.5 11.6*    268     BMP:    Recent Labs     07/07/24  1126      K 3.4*      CO2 23   BUN 18   CREATININE 1.1   GLUCOSE 85     Hepatic:   Recent Labs     07/07/24  1126   AST 22   ALT 21   BILITOT 0.4   ALKPHOS 77     Lipids:   Lab Results   Component Value Date/Time    CHOL 229 04/18/2024 08:40 AM    HDL 39 04/18/2024 08:40 AM    TRIG 183 04/18/2024 08:40 AM     Hemoglobin A1C:   Lab Results   Component Value Date/Time    LABA1C 4.6 02/26/2020 09:44 AM     TSH:   Lab Results   Component Value Date/Time    TSH  2.96 07/07/2024 12:48 PM    TSH 0.56 04/11/2022 08:56 AM     Troponin: No results found for: \"TROPONINT\"  Lactic Acid: No results for input(s): \"LACTA\" in the last 72 hours.  BNP:   No results for input(s): \"PROBNP\" in the last 72 hours.  UA:  Lab Results   Component Value Date/Time    WBCUA 1 05/31/2017 03:21 PM    RBCUA 2 05/31/2017 03:21 PM     Urine Cultures: No results found for: \"LABURIN\"  Blood Cultures:   Lab Results   Component Value Date/Time    BC No growth after 5 days of incubation. 05/31/2017 03:21 PM     Lab Results   Component Value Date/Time    BLOODCULT2 No growth after 5 days of incubation. 05/31/2017 03:20 PM     Organism: No results found for: \"ORG\"      Assessment and Recommendations   Sarah Thapa is a 63 y.o. female who presents with chest pain    Chest pain  Patient reports intermittent substernal chest pain/pressure for months..  Currently asymptomatic     Chest x-ray, CTA chest with no acute findings.  EKG and troponins negative  Await results of stress test done today     Elevated blood pressure without prior history of hypertension, likely essential hypertension  Blood pressures persistently on admission..  BP improved with lisinopril and metoprolol     Abnormal CT  Splenomegaly, epicardial, angel hepatis, retroperitoneum lymphadenopathy, subtle apical lung nodules  hematology/oncology consult appreciated  GI consulted and plans on EUS/FNA for abdominal lymphadenopathy tomorrow     Psoriatic arthritis--on Otezla/meloxicam     Hypothyroidism--on Synthroid-obtain TSH         Diet Diet NPO     DVT Prophylaxis [x] Lovenox, []  Heparin, [] SCDs, [] Ambulation,  [] Eliquis, [] Xarelto  [] Coumadin   Code Status Full Code   Disposition From: Home  Expected Disposition: Home  Estimated Date of Discharge: 24 hours  Patient requires continued admission due to EUS in 24 hours   Surrogate Decision Maker/ POA       Personally reviewed Lab Studies and Imaging        Medical Decision Making:  The

## 2024-07-08 NOTE — PROGRESS NOTES
IR Consulted for CT Guided LN Biopsy:    - Small left para aortic lymph node may possibly be accessible by percutaneous approach.  - More bulky abnormal lymph nodes in the angel hepatic and para duodenal region.  - Recommend GI consult for possible endoscopic biopsy of angel hepatis lymph node.    Berkley Farias MD  Vascular and Interventional Radiology

## 2024-07-08 NOTE — CONSULTS
Oncology Consult    Patient currently not in room.    Will order CT-guided lymph node biopsy assuming they are accessible.    Pankaj Best MD

## 2024-07-08 NOTE — CONSULTS
Oncology [facility]    Consult Note      Requesting Physician:  alannaitilist     CHIEF COMPLAINT:  lymphadenopathy       HISTORY OF PRESENT ILLNESS:    Ms. Thapa  is a 63 y.o. female we are seeing in consultation for epicardial, angel hepatic, and retroperitoneal lymphadenopathy found on CTA of the chest abd and pelvis after presenting to the ER for chest pain. She reports she hasn't been feeling herself the last month or so, increased fatigue, intermittent cp and lightheadedness. No SOB, abd pain, n/v/d. No weight loss or night sweats.     Last mammogram August 2023 benign  Cologaurd feb 2022 negative     Nonsmoker  Rarely drinks alcohol   No street drugs    Lives at home with her . Good family and friend support. They have a home in [place] they vacation in the winter.     ICD-10-CM    1. Chest pain with high risk of acute coronary syndrome  R07.9       2. Adenopathy  R59.9       3. Lung nodules  R91.8       4. Chest pain, unspecified type  R07.9 Nuclear stress test with myocardial perfusion     Nuclear stress test with myocardial perfusion             Past Medical History:  Past Medical History:   Diagnosis Date    Arthralgia of hand, right     Chronic seasonal allergic rhinitis due to pollen     Edema of both legs     Hyperlipidemia, mixed     past hx BORDERLINE    Hypothyroidism (acquired)     Non morbid obesity due to excess calories     Psoriasis (a type of skin inflammation)     Psoriatic arthritis (HCC)        Past Surgical History:  Past Surgical History:   Procedure Laterality Date    ACHILLES TENDON SURGERY Left 2/2/2021    EXCISION POSTERIOR CALCANEAL EXOSTOSIS LEFT, DEBRIDEMENT AND REPAIR LEFT ACHILLES performed by Austin Duarte DPM at [facility] OR    BRONCHOSCOPY  06/05/2017    CHOLECYSTECTOMY, LAPAROSCOPIC  10/12/2016    with milena / Dr. Archibald.     FOOT SURGERY Left      without complication    Edema of both legs    Hypothyroidism (acquired)    Non morbid obesity due to excess calories    Psoriasis (a type of skin inflammation)    Calculus of gallbladder with acute cholecystitis without obstruction    Hyperlipidemia, mixed    Chronic seasonal allergic rhinitis due to pollen    De Quervain's tenosynovitis, right    Carpal tunnel syndrome, right    Chest pain       IMPRESSION/RECOMMENDATIONS:  Lymphadenopathy. Incidentally found on CTA of chest abd and pelvis. Epicardial, angel hepatic, retroperitoneal. IR reviewed case did not feel they could obtain biopsy and rec GI eval for EUS with biopsy of the angel hepatis LN.     Chest pain. Intermittent. Stress test this morning.     Psoriatic arthritis. Otezla and meloxicam since Sept 2020. Managed by Rheumatology.         Thank you for asking me to see the patient.       Taylor Naranjo, APRN - CNP  Please Contact Through [employer]

## 2024-07-08 NOTE — CONSULTS
GASTROENTEROLOGY INPATIENT CONSULTATION        IDENTIFYING DATA/REASON FOR CONSULTATION   PATIENT:  Sarah Thapa  MRN:  [id number]  ADMIT DATE: 7/7/2024  TIME OF EVALUATION: 7/8/2024 12:26 PM  HOSPITAL STAY:   LOS: 0 days     REASON FOR CONSULTATION:  EUS with FNA of angel hepatis LN    HISTORY OF PRESENT ILLNESS   Sarah Thapa is a 63 y.o. female with a PMH of HLD, hypothyroidism, psoriatic arthritis who presented on 7/7/2024 with chest pain.  She reports it has been occurring off and on for the past couple months but was significantly worse yesterday morning waking her up from sleep.  She describes a tight pressure-like pain located beneath her sternum.  Denies associated shortness of breath, nausea, vomiting, fevers or chills.  Workup in the ED with a CT scan of the chest/abdomen/pelvis showed  epicardial, angel hepatic and retroperitoneal adenopathy, multiple 2-3mm centrilobular and tree-in-bud nodules in the upper lobes of the lungs, splenomegaly, focal luminal narrowing of the celiac trunk.  We have been consulted for EUS with FNA of the angel hepatis lymph nodes.  Patient denies any unexplained weight loss, anorexia, dysphagia, family history of GI malignancies.  She has never had an EGD or colonoscopy procedure          PAST MEDICAL, SURGICAL, FAMILY, and SOCIAL HISTORY     Past Medical History:   Diagnosis Date    Arthralgia of hand, right     Chronic seasonal allergic rhinitis due to pollen     Edema of both legs     Hyperlipidemia, mixed     past hx BORDERLINE    Hypothyroidism (acquired)     Non morbid obesity due to excess calories     Psoriasis (a type of skin inflammation)     Psoriatic arthritis (HCC)      Past Surgical History:   Procedure Laterality Date    ACHILLES TENDON SURGERY Left 2/2/2021    EXCISION POSTERIOR CALCANEAL EXOSTOSIS LEFT, DEBRIDEMENT AND REPAIR LEFT ACHILLES performed by Austin Duarte DPM at [facility] OR    BRONCHOSCOPY  06/05/2017    CHOLECYSTECTOMY, LAPAROSCOPIC

## 2024-07-08 NOTE — CARE COORDINATION
Discharge Planning:      (CM) reviewed the patient's chart to assess needs. Patient's Readmission Risk Score is 4%  . Patient's medical insurance is  Payor: AETNA / Plan: AET[employer] NAP CHOICE POS II / Product Type: *No Product type* / .  Patient's PCP is Marvel Arceo DO .  No needs anticipated, at this time. CM team to follow. Staff to inform CM if additional discharge needs arise.    Pts preferred pharmacy is   [id number] - [place], [place] - [address] [address] [facility] - P [phone] - F [phone]  [address] [facility] [address]  Phone: [phone] Fax: [phone]    [id number] - [place], [place] - [address] [place] [address] - P [phone] - F [phone]  [address] [facility] [address]  Phone: [phone] Fax: [phone]    [facility] - [place], [place] - [facility] [facility] - P [phone] - F [phone]  [address] [facility] [address]  Phone: [phone] Fax: [phone]    Please consult SW/CM if a d/c need should arise.   JAUN Parkinson  [phone]  Electronically signed by JAUN Lipscomb on 7/8/2024 at 9:18 AM

## 2024-07-09 ENCOUNTER — ANESTHESIA (OUTPATIENT)
Dept: ENDOSCOPY | Age: 64
End: 2024-07-09
Payer: COMMERCIAL

## 2024-07-09 ENCOUNTER — ANESTHESIA EVENT (OUTPATIENT)
Dept: ENDOSCOPY | Age: 64
End: 2024-07-09
Payer: COMMERCIAL

## 2024-07-09 PROBLEM — R59.9 ADENOPATHY: Status: ACTIVE | Noted: 2024-07-09

## 2024-07-09 LAB
ANION GAP SERPL CALCULATED.3IONS-SCNC: 9 MMOL/L (ref 3–16)
BASOPHILS # BLD: 0.1 K/UL (ref 0–0.2)
BASOPHILS NFR BLD: 1.2 %
BUN SERPL-MCNC: 17 MG/DL (ref 7–20)
CALCIUM SERPL-MCNC: 10.5 MG/DL (ref 8.3–10.6)
CHLORIDE SERPL-SCNC: 107 MMOL/L (ref 99–110)
CO2 SERPL-SCNC: 24 MMOL/L (ref 21–32)
CREAT SERPL-MCNC: 0.9 MG/DL (ref 0.6–1.2)
DEPRECATED RDW RBC AUTO: 13.1 % (ref 12.4–15.4)
EOSINOPHIL # BLD: 0.4 K/UL (ref 0–0.6)
EOSINOPHIL NFR BLD: 5.1 %
GFR SERPLBLD CREATININE-BSD FMLA CKD-EPI: 72 ML/MIN/{1.73_M2}
GLUCOSE SERPL-MCNC: 114 MG/DL (ref 70–99)
HCT VFR BLD AUTO: 33.2 % (ref 36–48)
HGB BLD-MCNC: 11.2 G/DL (ref 12–16)
LYMPHOCYTES # BLD: 2.5 K/UL (ref 1–5.1)
LYMPHOCYTES NFR BLD: 29.4 %
MCH RBC QN AUTO: 30.7 PG (ref 26–34)
MCHC RBC AUTO-ENTMCNC: 33.8 G/DL (ref 31–36)
MCV RBC AUTO: 90.9 FL (ref 80–100)
MONOCYTES # BLD: 1.5 K/UL (ref 0–1.3)
MONOCYTES NFR BLD: 18.1 %
NEUTROPHILS # BLD: 3.9 K/UL (ref 1.7–7.7)
NEUTROPHILS NFR BLD: 46.2 %
PLATELET # BLD AUTO: 271 K/UL (ref 135–450)
PMV BLD AUTO: 7.7 FL (ref 5–10.5)
POTASSIUM SERPL-SCNC: 3.3 MMOL/L (ref 3.5–5.1)
POTASSIUM SERPL-SCNC: 3.8 MMOL/L (ref 3.5–5.1)
RBC # BLD AUTO: 3.65 M/UL (ref 4–5.2)
SODIUM SERPL-SCNC: 140 MMOL/L (ref 136–145)
WBC # BLD AUTO: 8.4 K/UL (ref 4–11)

## 2024-07-09 PROCEDURE — 7100000000 HC PACU RECOVERY - FIRST 15 MIN: Performed by: INTERNAL MEDICINE

## 2024-07-09 PROCEDURE — 6370000000 HC RX 637 (ALT 250 FOR IP): Performed by: INTERNAL MEDICINE

## 2024-07-09 PROCEDURE — 84132 ASSAY OF SERUM POTASSIUM: CPT

## 2024-07-09 PROCEDURE — 88177 CYTP FNA EVAL EA ADDL: CPT

## 2024-07-09 PROCEDURE — 079D8ZX DRAINAGE OF AORTIC LYMPHATIC, VIA NATURAL OR ARTIFICIAL OPENING ENDOSCOPIC APPROACH, DIAGNOSTIC: ICD-10-PCS | Performed by: INTERNAL MEDICINE

## 2024-07-09 PROCEDURE — 88173 CYTOPATH EVAL FNA REPORT: CPT

## 2024-07-09 PROCEDURE — 1200000000 HC SEMI PRIVATE

## 2024-07-09 PROCEDURE — 80048 BASIC METABOLIC PNL TOTAL CA: CPT

## 2024-07-09 PROCEDURE — 2580000003 HC RX 258: Performed by: NURSE ANESTHETIST, CERTIFIED REGISTERED

## 2024-07-09 PROCEDURE — 88305 TISSUE EXAM BY PATHOLOGIST: CPT

## 2024-07-09 PROCEDURE — 36415 COLL VENOUS BLD VENIPUNCTURE: CPT

## 2024-07-09 PROCEDURE — 88172 CYTP DX EVAL FNA 1ST EA SITE: CPT

## 2024-07-09 PROCEDURE — 07BB4ZX EXCISION OF MESENTERIC LYMPHATIC, PERCUTANEOUS ENDOSCOPIC APPROACH, DIAGNOSTIC: ICD-10-PCS | Performed by: INTERNAL MEDICINE

## 2024-07-09 PROCEDURE — 88185 FLOWCYTOMETRY/TC ADD-ON: CPT

## 2024-07-09 PROCEDURE — 2720000010 HC SURG SUPPLY STERILE: Performed by: INTERNAL MEDICINE

## 2024-07-09 PROCEDURE — 6360000002 HC RX W HCPCS: Performed by: NURSE ANESTHETIST, CERTIFIED REGISTERED

## 2024-07-09 PROCEDURE — 88184 FLOWCYTOMETRY/ TC 1 MARKER: CPT

## 2024-07-09 PROCEDURE — 3609020800 HC EGD W/EUS FNA: Performed by: INTERNAL MEDICINE

## 2024-07-09 PROCEDURE — 3700000001 HC ADD 15 MINUTES (ANESTHESIA): Performed by: INTERNAL MEDICINE

## 2024-07-09 PROCEDURE — 2500000003 HC RX 250 WO HCPCS: Performed by: NURSE ANESTHETIST, CERTIFIED REGISTERED

## 2024-07-09 PROCEDURE — 6370000000 HC RX 637 (ALT 250 FOR IP): Performed by: HOSPITALIST

## 2024-07-09 PROCEDURE — 2709999900 HC NON-CHARGEABLE SUPPLY: Performed by: INTERNAL MEDICINE

## 2024-07-09 PROCEDURE — 94760 N-INVAS EAR/PLS OXIMETRY 1: CPT

## 2024-07-09 PROCEDURE — 7100000001 HC PACU RECOVERY - ADDTL 15 MIN: Performed by: INTERNAL MEDICINE

## 2024-07-09 PROCEDURE — 85025 COMPLETE CBC W/AUTO DIFF WBC: CPT

## 2024-07-09 PROCEDURE — 2580000003 HC RX 258: Performed by: INTERNAL MEDICINE

## 2024-07-09 PROCEDURE — 3700000000 HC ANESTHESIA ATTENDED CARE: Performed by: INTERNAL MEDICINE

## 2024-07-09 PROCEDURE — 2580000003 HC RX 258: Performed by: HOSPITALIST

## 2024-07-09 RX ORDER — PROPOFOL 10 MG/ML
INJECTION, EMULSION INTRAVENOUS PRN
Status: DISCONTINUED | OUTPATIENT
Start: 2024-07-09 | End: 2024-07-09 | Stop reason: SDUPTHER

## 2024-07-09 RX ORDER — SODIUM CHLORIDE 9 MG/ML
INJECTION, SOLUTION INTRAVENOUS PRN
Status: DISCONTINUED | OUTPATIENT
Start: 2024-07-09 | End: 2024-07-09

## 2024-07-09 RX ORDER — SODIUM CHLORIDE 0.9 % (FLUSH) 0.9 %
5-40 SYRINGE (ML) INJECTION EVERY 12 HOURS SCHEDULED
Status: DISCONTINUED | OUTPATIENT
Start: 2024-07-09 | End: 2024-07-09

## 2024-07-09 RX ORDER — FENTANYL CITRATE 0.05 MG/ML
50 INJECTION, SOLUTION INTRAMUSCULAR; INTRAVENOUS EVERY 5 MIN PRN
Status: DISCONTINUED | OUTPATIENT
Start: 2024-07-09 | End: 2024-07-09

## 2024-07-09 RX ORDER — SODIUM CHLORIDE 9 MG/ML
INJECTION, SOLUTION INTRAVENOUS CONTINUOUS PRN
Status: DISCONTINUED | OUTPATIENT
Start: 2024-07-09 | End: 2024-07-09 | Stop reason: SDUPTHER

## 2024-07-09 RX ORDER — LIDOCAINE HYDROCHLORIDE 20 MG/ML
INJECTION, SOLUTION EPIDURAL; INFILTRATION; INTRACAUDAL; PERINEURAL PRN
Status: DISCONTINUED | OUTPATIENT
Start: 2024-07-09 | End: 2024-07-09 | Stop reason: SDUPTHER

## 2024-07-09 RX ORDER — ONDANSETRON 2 MG/ML
4 INJECTION INTRAMUSCULAR; INTRAVENOUS
Status: DISCONTINUED | OUTPATIENT
Start: 2024-07-09 | End: 2024-07-09

## 2024-07-09 RX ORDER — NALOXONE HYDROCHLORIDE 0.4 MG/ML
INJECTION, SOLUTION INTRAMUSCULAR; INTRAVENOUS; SUBCUTANEOUS PRN
Status: DISCONTINUED | OUTPATIENT
Start: 2024-07-09 | End: 2024-07-09

## 2024-07-09 RX ORDER — FENTANYL CITRATE 0.05 MG/ML
25 INJECTION, SOLUTION INTRAMUSCULAR; INTRAVENOUS EVERY 5 MIN PRN
Status: DISCONTINUED | OUTPATIENT
Start: 2024-07-09 | End: 2024-07-09

## 2024-07-09 RX ORDER — MEPERIDINE HYDROCHLORIDE 25 MG/ML
12.5 INJECTION INTRAMUSCULAR; INTRAVENOUS; SUBCUTANEOUS EVERY 5 MIN PRN
Status: DISCONTINUED | OUTPATIENT
Start: 2024-07-09 | End: 2024-07-09

## 2024-07-09 RX ORDER — SODIUM CHLORIDE 0.9 % (FLUSH) 0.9 %
5-40 SYRINGE (ML) INJECTION PRN
Status: DISCONTINUED | OUTPATIENT
Start: 2024-07-09 | End: 2024-07-09

## 2024-07-09 RX ADMIN — POTASSIUM CHLORIDE 40 MEQ: 1500 TABLET, EXTENDED RELEASE ORAL at 05:33

## 2024-07-09 RX ADMIN — SODIUM CHLORIDE, PRESERVATIVE FREE 10 ML: 5 INJECTION INTRAVENOUS at 08:20

## 2024-07-09 RX ADMIN — ATORVASTATIN CALCIUM 40 MG: 40 TABLET, FILM COATED ORAL at 21:04

## 2024-07-09 RX ADMIN — SODIUM CHLORIDE, PRESERVATIVE FREE 10 ML: 5 INJECTION INTRAVENOUS at 21:05

## 2024-07-09 RX ADMIN — METOPROLOL TARTRATE 25 MG: 25 TABLET, FILM COATED ORAL at 21:04

## 2024-07-09 RX ADMIN — PROPOFOL 180 MCG/KG/MIN: 10 INJECTION, EMULSION INTRAVENOUS at 15:40

## 2024-07-09 RX ADMIN — ACETAMINOPHEN 325MG 650 MG: 325 TABLET ORAL at 21:06

## 2024-07-09 RX ADMIN — LIDOCAINE HYDROCHLORIDE 100 MG: 20 INJECTION, SOLUTION EPIDURAL; INFILTRATION; INTRACAUDAL; PERINEURAL at 15:39

## 2024-07-09 RX ADMIN — ACETAMINOPHEN 325MG 650 MG: 325 TABLET ORAL at 08:20

## 2024-07-09 RX ADMIN — LEVOTHYROXINE SODIUM 112 MCG: 0.11 TABLET ORAL at 05:33

## 2024-07-09 RX ADMIN — SODIUM CHLORIDE: 9 INJECTION, SOLUTION INTRAVENOUS at 15:31

## 2024-07-09 RX ADMIN — PROPOFOL 90 MG: 10 INJECTION, EMULSION INTRAVENOUS at 15:39

## 2024-07-09 RX ADMIN — LISINOPRIL 20 MG: 20 TABLET ORAL at 08:20

## 2024-07-09 RX ADMIN — METOPROLOL TARTRATE 25 MG: 25 TABLET, FILM COATED ORAL at 08:20

## 2024-07-09 ASSESSMENT — PAIN DESCRIPTION - PAIN TYPE
TYPE: ACUTE PAIN
TYPE: ACUTE PAIN

## 2024-07-09 ASSESSMENT — PAIN DESCRIPTION - LOCATION
LOCATION: CHEST

## 2024-07-09 ASSESSMENT — PAIN DESCRIPTION - DESCRIPTORS
DESCRIPTORS: ACHING;DISCOMFORT
DESCRIPTORS: TIGHTNESS
DESCRIPTORS: ACHING;DISCOMFORT;TIGHTNESS
DESCRIPTORS: TIGHTNESS
DESCRIPTORS: ACHING;DISCOMFORT;TIGHTNESS

## 2024-07-09 ASSESSMENT — PAIN - FUNCTIONAL ASSESSMENT
PAIN_FUNCTIONAL_ASSESSMENT: NONE - DENIES PAIN
PAIN_FUNCTIONAL_ASSESSMENT: ACTIVITIES ARE NOT PREVENTED
PAIN_FUNCTIONAL_ASSESSMENT: PREVENTS OR INTERFERES SOME ACTIVE ACTIVITIES AND ADLS
PAIN_FUNCTIONAL_ASSESSMENT: ACTIVITIES ARE NOT PREVENTED
PAIN_FUNCTIONAL_ASSESSMENT: 0-10
PAIN_FUNCTIONAL_ASSESSMENT: ACTIVITIES ARE NOT PREVENTED

## 2024-07-09 ASSESSMENT — PAIN DESCRIPTION - FREQUENCY
FREQUENCY: CONTINUOUS
FREQUENCY: CONTINUOUS
FREQUENCY: INTERMITTENT

## 2024-07-09 ASSESSMENT — PAIN DESCRIPTION - ONSET
ONSET: ON-GOING
ONSET: ON-GOING

## 2024-07-09 ASSESSMENT — PAIN SCALES - GENERAL
PAINLEVEL_OUTOF10: 0
PAINLEVEL_OUTOF10: 4

## 2024-07-09 ASSESSMENT — ENCOUNTER SYMPTOMS: SHORTNESS OF BREATH: 0

## 2024-07-09 NOTE — PROGRESS NOTES
ONCOLOGY HEMATOLOGY CARE PROGRESS NOTE      SUBJECTIVE:  Patient still having intermittent lower sternal back/upper abdomen pain.  She denies any shortness of breath.    ROS:     Constitutional:  No weight loss, No fever, No chills, No night sweats.  Energy level good.  Eyes:  No impairment or change in vision  ENT / Mouth:  No pain, abnormal ulceration, bleeding, nasal drip or change in voice or hearing  Cardiovascular:  No chest pain, palpitations, new edema, or calf discomfort  Respiratory:  No pain, hemoptysis, change to breathing  Breast:  No pain, discharge, change in appearance or texture  Gastrointestinal:  No pain, cramping, jaundice, change to eating and bowel habits  Urinary:  No pain, bleeding or change in continence  Genitalia: No pain, bleeding or discharge  Musculoskeletal:  No redness, pain, edema or weakness  Skin:  No pruritus, rash, change to nodules or lesions  Neurologic:  No discomfort, change in mental status, speech, sensory or motor activity  Psychiatric:  No change in concentration or change to affect or mood  Endocrine:  No hot flashes, increased thirst, or change to urine production  Hematologic: No petechiae, ecchymosis or bleeding  Lymphatic:  No lymphadenopathy or lymphedema  Allergy / Immunologic:  No eczema, hives, frequent or recurrent infections    OBJECTIVE        Physical    VITALS:  Patient Vitals for the past 24 hrs:   BP Temp Temp src Pulse Resp SpO2 Weight   07/09/24 0841 -- -- -- -- -- 96 % --   07/09/24 0815 (!) 155/80 -- -- 58 -- -- --   07/09/24 0730 (!) 168/82 97.5 °F (36.4 °C) Oral 60 17 96 % --   07/09/24 0530 -- -- -- -- -- -- 93.7 kg (206 lb 9.1 oz)   07/09/24 0507 (!) 151/75 98.2 °F (36.8 °C) Oral 53 16 95 % --   07/09/24 0002 (!) 149/81 98.3 °F (36.8 °C) Oral 60 16 94 % --   07/08/24 2345 124/62 98 °F (36.7 °C) Oral 73 19 95 % --   07/08/24 2030 134/88 98.5 °F (36.9 °C) Oral 71 17 93 % --   07/08/24 1610 (!) 143/81 98.3 °F  (36.8 °C) Oral 65 18 93 % --   07/08/24 1143 136/87 98.3 °F (36.8 °C) Oral 61 18 96 % --   07/08/24 1113 (!) 151/83 -- -- 76 -- -- --       24HR INTAKE/OUTPUT:    Intake/Output Summary (Last 24 hours) at 7/9/2024 0912  Last data filed at 7/8/2024 2032  Gross per 24 hour   Intake 300 ml   Output --   Net 300 ml       CONSTITUTIONAL: awake, alert, cooperative, no apparent distress   EYES: pupils equal, round and reactive to light, sclera clear and conjunctiva normal  ENT: Normocephalic, without obvious abnormality, atraumatic  NECK: supple, symmetrical, no jugular venous distension and no carotid bruits   HEMATOLOGIC/LYMPHATIC: no cervical, supraclavicular or axillary lymphadenopathy   LUNGS: no increased work of breathing and clear to auscultation   CARDIOVASCULAR: regular rate and rhythm, normal S1 and S2, no murmur noted  ABDOMEN: normal bowel sounds x 4, soft, non-distended, non-tender, no masses palpated, no hepatosplenomgaly   MUSCULOSKELETAL: full range of motion noted, tone is normal  NEUROLOGIC: awake, alert, oriented to name, place and time. Motor skills grossly intact.   SKIN: Normal skin color, texture, turgor and no jaundice. appears intact   EXTREMITIES: no LE edema     DATA:  CBC:    Recent Labs     07/09/24  0158 07/08/24  0514 07/07/24  1126   WBC 8.4 7.9 8.5   NEUTROABS 3.9  --  5.2   LYMPHOPCT 29.4  --  19.6   RBC 3.65* 3.81* 4.15   HGB 11.2* 11.6* 12.5   HCT 33.2* 34.0* 37.4   MCV 90.9 89.3 90.1   MCH 30.7 30.5 30.1   MCHC 33.8 34.1 33.4   RDW 13.1 13.3 13.1    268 305       PT/INR:  No results for input(s): \"INR\" in the last 720 hours.    Invalid input(s): \"PROT\"  PTT:  No results for input(s): \"APTT\" in the last 720 hours.    CMP:    Recent Labs     07/09/24  0157 07/08/24  0514 07/07/24  1126     --  141   K 3.3*  --  3.4*     --  106   CO2 24  --  23   GLUCOSE 114*  --  85   BUN 17  --  18   CREATININE 0.9 1.0 1.1   LABGLOM 72 63 56*   CALCIUM 10.5  --  10.4   AGRATIO  --

## 2024-07-09 NOTE — FLOWSHEET NOTE
Patient Potassium was 3.3 PRN Potassium replacement administered per order and annelise-op check list initiated and CHG wipes, new gown and socks given to patient to complete later due to being scheduled for 2:30 P.M.

## 2024-07-09 NOTE — PROGRESS NOTES
4 Eyes Skin Assessment     NAME:  Sarah Thapa  YOB: 1960  MEDICAL RECORD NUMBER:  [id number]    The patient is being assessed for  Post-Op Surgical    I agree that at least one RN has performed a thorough Head to Toe Skin Assessment on the patient. ALL assessment sites listed below have been assessed.      Areas assessed by both nurses:    Head, Face, Ears, Shoulders, Back, Chest, Arms, Elbows, Hands, Sacrum. Buttock, Coccyx, Ischium, and Legs. Feet and Heels        Does the Patient have a Wound? No noted wound(s)       Roshan Prevention initiated by RN: No  Wound Care Orders initiated by RN: No    Pressure Injury (Stage 3,4, Unstageable, DTI, NWPT, and Complex wounds) if present, place Wound referral order by RN under : No    New Ostomies, if present place, Ostomy referral order under : No     Nurse 1 eSignature: Electronically signed by Britta Barone RN on 7/9/24 at 6:28 PM EDT    **SHARE this note so that the co-signing nurse can place an eSignature**    Nurse 2 eSignature: Electronically signed by Sami Borden RN on 7/9/24 at 6:41 PM EDT

## 2024-07-09 NOTE — PROGRESS NOTES
Patient returned from PACU. Ambulated to bed without complication. VSS on room air. Denies pain. Tearful but states that is normal for her after anesthesia. Diet tray ordered. Water and jello provided. In bed in locked and lowest position. Call light within reach. Electronically signed by Britta Barone RN on 7/9/2024 at 5:01 PM

## 2024-07-09 NOTE — PROGRESS NOTES
Endoscopic Procedure Performed:    ENDOSCOPIC BRONCHIAL ULTRASOUND WITH: Fine Needle Aspiration of    Lymph Node Station(s):     []  Not Applicable  [] 1 (Supraclavicular zone nodes)  [] 2R (Right Upper Paratracheal)   [] 2L (Left Upper Paratracheal)  [] 3 (Prevascular and Prevertabral nodes)  [] 4R (Right Lower Paratracheal)     [] 4L (Left Lower Paratracheal)   [] 5 (Subaortic)   [] 6 (Para-aortic nodes)  []  7 (Subcarinal)  []  8 (Paraesophageal)  []  9 (Pulmonary ligament)   []  10 (Hilar)    []  11 L (Interlobar)              []  11 R (Interlobar)                      *** Bernadette portal lymph node x4 passes      (0) Jars of Cytolyte labeled with patient label and site of FNA/FNB and delivered to cytology     All FNA specimens managed by cytotechnician.

## 2024-07-09 NOTE — ANESTHESIA POSTPROCEDURE EVALUATION
[facility] Department of Anesthesiology  Post-Anesthesia Note       Name:  Saarh Thapa                                  Age:  63 y.o.  MRN:  [id number]     Last Vitals & Oxygen Saturation: BP (!) 144/85   Pulse 64   Temp 97 °F (36.1 °C) (Temporal)   Resp 20   Ht 1.715 m (5' 7.5\")   Wt 93.7 kg (206 lb 9.1 oz)   SpO2 96%   BMI 31.88 kg/m²   Patient Vitals for the past 4 hrs:   BP Temp Temp src Pulse Resp SpO2   07/09/24 1630 (!) 144/85 -- -- 64 20 96 %   07/09/24 1625 133/81 -- -- 67 26 97 %   07/09/24 1623 124/80 -- -- 65 20 98 %   07/09/24 1620 124/80 97 °F (36.1 °C) Temporal 70 18 98 %   07/09/24 1329 (!) 158/87 98 °F (36.7 °C) Temporal 63 16 96 %       Level of consciousness:  Awake, alert    Respiratory: Respirations easy, no distress. Stable.    Cardiovascular: Hemodynamically stable.    Hydration: Adequate.    PONV: Adequately managed.    Post-op pain: Adequately controlled.    Post-op assessment: Tolerated anesthetic well without complication.    Complications:  None.    Darrell Brunson MD  July 9, 2024   4:40 PM

## 2024-07-09 NOTE — PROGRESS NOTES
[place]2.0    [facility] [place]      Name:  Sarah Thapa /Age/Sex: 1960  (63 y.o. female)   MRN & CSN:  [id number] & [id number] Encounter Date/Time: 2024 8:57 AM EDT   Location:  Y8O-4297/4108-01 PCP: Marvel Arceo DO     Attending:Daniel Abraham MD       Hospital Day: 3      HPI :   Chief Complaint:     Sarah Thapa is a 63 y.o. female who presents with intermittent chest pain      Subjective:     Endorses ongoing chest discomfort, no abdominal pain, nausea or vomiting.  No fevers      Review of Systems:      Pertinent positives and negatives discussed in HPI    Objective:     Intake/Output Summary (Last 24 hours) at 2024 1203  Last data filed at 2024  Gross per 24 hour   Intake 300 ml   Output --   Net 300 ml        Vitals:   Vitals:    24 0730 24 0815 24 0841 24 1106   BP: (!) 168/82 (!) 155/80  136/88   Pulse: 60 58  55   Resp: 17   16   Temp: 97.5 °F (36.4 °C)   98.4 °F (36.9 °C)   TempSrc: Oral   Oral   SpO2: 96%  96% 95%   Weight:       Height:             Physical Exam:      Physical Exam Performed:    /88   Pulse 55   Temp 98.4 °F (36.9 °C) (Oral)   Resp 16   Ht 1.715 m (5' 7.5\")   Wt 93.7 kg (206 lb 9.1 oz)   SpO2 95%   BMI 31.88 kg/m²     General appearance: No apparent distress, appears stated age and cooperative.  HEENT: Pupils equal, round, and reactive to light. Conjunctivae/corneas clear.  Neck: Supple, with full range of motion. No jugular venous distention. Trachea midline.  Respiratory:  Normal respiratory effort. Clear to auscultation, bilaterally without Rales/Wheezes/Rhonchi.  Cardiovascular: Regular rate and rhythm with normal S1/S2 without murmurs, rubs or gallops.  Abdomen: Soft, non-tender, non-distended with normal bowel sounds.  Musculoskeletal: No clubbing, cyanosis or edema bilaterally.  Full range of motion without deformity.  Neurologic:  Neurovascularly intact without any focal sensory/motor  Discharge: Within 24 hours  Patient requires continued admission due to EUS-GI clearance   Surrogate Decision Maker/ POA       Personally reviewed Lab Studies and Imaging        Medical Decision Making:  The following items were considered in medical decision making:  Discussion of patient care with other providers  Reviewed clinical lab tests  Reviewed radiology tests  Reviewed other diagnostic tests/interventions  Independent review of radiologic images  Microbiology cultures and other micro tests reviewed        Electronically signed by Daniel Abraham MD on 7/9/2024 at 12:03 PM  Comment: Please note this report has been produced using speech recognition software and may contain errors related to that system including errors in grammar, punctuation, and spelling, as well as words and phrases that may be inappropriate. If there are any questions or concerns, please feel free to contact the dictating provider for clarification.

## 2024-07-09 NOTE — OP NOTE
Esophagogastroduodenoscopy Note    Patient:   Sarah Thapa    :    1960    Facility:   [facility]    Heart PERRL, conjunctiva normal          Referring/PCP: Marvel Arceo DO    Procedure:   Esophagogastroduodenoscopy   Date:     2024   Endoscopist:  Florentin Nj MD     Preoperative Diagnosis:    63-year-old female presents for evaluation of retroperitoneal and periportal lymph nodes.  Patient presented to the hospital with chest discomfort.  A CT scan of the abdomen and pelvis was obtained revealing adenopathy and splenomegaly.  She presents for upper endoscopy and endoscopic ultrasound with fine-needle biopsy.    Postoperative Diagnosis:  1.  Normal esophageal mucosa  2.  Normal gastroesophageal junction  3.  Normal gastric mucosa  4.  Normal duodenal mucosa  5.  Normal ampulla of Vater.  6.  Conglomerate of abnormally enlarged, hypoechoic periportal and celiac lymph nodes.  Fine-needle biopsies obtained from periportal lymph nodes.  Initial interpretation showing small lymphocytes.  Specimen sent for pathology and flow cytometry.    Anesthesia:  MAC    Estimated blood loss: Minimal    Complications: None    Specimen: Fine-needle biopsy of periportal lymph nodes    Instrument: , GF [id number]    Description of Procedure:  Informed consent was obtained from the patient after explanation of the procedure including indications, description of the procedure,  benefits and possible risks and complications of the procedure, and alternatives. Questions were answered.  The patient's history was reviewed and a directed physical examination was performed prior to the procedure.    Patient was monitored throughout the procedure with pulse oximetry and periodic assessment of vital signs. Patient was sedated as noted above. With the patient in the left lateral decubitus position, the Olympus videoendoscope was placed in the patient's mouth and under direct

## 2024-07-09 NOTE — PLAN OF CARE
Problem: Discharge Planning  Goal: Discharge to home or other facility with appropriate resources  7/9/2024 1021 by Britta Barone, RN  Outcome: Progressing  Flowsheets (Taken 7/9/2024 0820)  Discharge to home or other facility with appropriate resources: Identify barriers to discharge with patient and caregiver  7/8/2024 2111 by Ghazal Reaves, RN  Outcome: Progressing     Problem: Pain  Goal: Verbalizes/displays adequate comfort level or baseline comfort level  7/9/2024 1021 by Britta Barone, RN  Outcome: Progressing  7/8/2024 2111 by Ghazal Reaves, RN  Outcome: Progressing

## 2024-07-09 NOTE — PLAN OF CARE
Problem: Discharge Planning  Goal: Discharge to home or other facility with appropriate resources  7/8/2024 2111 by Ghazal Reaves, RN  Outcome: Progressing  7/8/2024 1140 by Brooke Perez, RN  Outcome: Progressing     Problem: Pain  Goal: Verbalizes/displays adequate comfort level or baseline comfort level  7/8/2024 2111 by Ghazal Reaves, RN  Outcome: Progressing  7/8/2024 1140 by Brooke Perez, RN  Outcome: Progressing      Minoxidil Counseling: Minoxidil is a topical medication which can increase blood flow where it is applied. It is uncertain how this medication increases hair growth. Side effects are uncommon and include stinging and allergic reactions.

## 2024-07-09 NOTE — ANESTHESIA PRE PROCEDURE
involved discussed with patient.  Patient verbalized an understanding and agrees to proceed.      Shar Harris MD  July 9, 2024  1:32 PM

## 2024-07-10 ENCOUNTER — TELEPHONE (OUTPATIENT)
Dept: FAMILY MEDICINE CLINIC | Age: 64
End: 2024-07-10

## 2024-07-10 VITALS
OXYGEN SATURATION: 95 % | DIASTOLIC BLOOD PRESSURE: 72 MMHG | HEART RATE: 62 BPM | SYSTOLIC BLOOD PRESSURE: 163 MMHG | RESPIRATION RATE: 16 BRPM | TEMPERATURE: 98 F | HEIGHT: 68 IN | WEIGHT: 205 LBS | BODY MASS INDEX: 31.07 KG/M2

## 2024-07-10 LAB
ANION GAP SERPL CALCULATED.3IONS-SCNC: 10 MMOL/L (ref 3–16)
BASOPHILS # BLD: 0.1 K/UL (ref 0–0.2)
BASOPHILS NFR BLD: 1.3 %
BUN SERPL-MCNC: 15 MG/DL (ref 7–20)
CALCIUM SERPL-MCNC: 10.1 MG/DL (ref 8.3–10.6)
CHLORIDE SERPL-SCNC: 107 MMOL/L (ref 99–110)
CO2 SERPL-SCNC: 24 MMOL/L (ref 21–32)
CREAT SERPL-MCNC: 0.9 MG/DL (ref 0.6–1.2)
DEPRECATED RDW RBC AUTO: 13.5 % (ref 12.4–15.4)
EOSINOPHIL # BLD: 0.4 K/UL (ref 0–0.6)
EOSINOPHIL NFR BLD: 4.7 %
GFR SERPLBLD CREATININE-BSD FMLA CKD-EPI: 72 ML/MIN/{1.73_M2}
GLUCOSE SERPL-MCNC: 92 MG/DL (ref 70–99)
HCT VFR BLD AUTO: 34.5 % (ref 36–48)
HGB BLD-MCNC: 11.8 G/DL (ref 12–16)
LYMPHOCYTES # BLD: 2.3 K/UL (ref 1–5.1)
LYMPHOCYTES NFR BLD: 26.3 %
MCH RBC QN AUTO: 30.6 PG (ref 26–34)
MCHC RBC AUTO-ENTMCNC: 34.1 G/DL (ref 31–36)
MCV RBC AUTO: 89.6 FL (ref 80–100)
MONOCYTES # BLD: 1.4 K/UL (ref 0–1.3)
MONOCYTES NFR BLD: 16.1 %
NEUTROPHILS # BLD: 4.6 K/UL (ref 1.7–7.7)
NEUTROPHILS NFR BLD: 51.6 %
PLATELET # BLD AUTO: 292 K/UL (ref 135–450)
PMV BLD AUTO: 8.1 FL (ref 5–10.5)
POTASSIUM SERPL-SCNC: 3.6 MMOL/L (ref 3.5–5.1)
RBC # BLD AUTO: 3.85 M/UL (ref 4–5.2)
SODIUM SERPL-SCNC: 141 MMOL/L (ref 136–145)
WBC # BLD AUTO: 8.9 K/UL (ref 4–11)

## 2024-07-10 PROCEDURE — 6370000000 HC RX 637 (ALT 250 FOR IP): Performed by: INTERNAL MEDICINE

## 2024-07-10 PROCEDURE — 80048 BASIC METABOLIC PNL TOTAL CA: CPT

## 2024-07-10 PROCEDURE — 2580000003 HC RX 258: Performed by: INTERNAL MEDICINE

## 2024-07-10 PROCEDURE — 36415 COLL VENOUS BLD VENIPUNCTURE: CPT

## 2024-07-10 PROCEDURE — 85025 COMPLETE CBC W/AUTO DIFF WBC: CPT

## 2024-07-10 RX ORDER — LISINOPRIL 20 MG/1
20 TABLET ORAL DAILY
Qty: 30 TABLET | Refills: 3 | Status: SHIPPED | OUTPATIENT
Start: 2024-07-11 | End: 2024-07-15 | Stop reason: SDUPTHER

## 2024-07-10 RX ORDER — HYDROCODONE BITARTRATE AND ACETAMINOPHEN 5; 325 MG/1; MG/1
1 TABLET ORAL EVERY 6 HOURS PRN
Qty: 9 TABLET | Refills: 0 | Status: SHIPPED | OUTPATIENT
Start: 2024-07-10 | End: 2024-07-15

## 2024-07-10 RX ORDER — METOPROLOL TARTRATE 25 MG/1
25 TABLET, FILM COATED ORAL 2 TIMES DAILY
Qty: 60 TABLET | Refills: 3 | Status: SHIPPED | OUTPATIENT
Start: 2024-07-10 | End: 2024-07-15 | Stop reason: SDUPTHER

## 2024-07-10 RX ORDER — HYDROCODONE BITARTRATE AND ACETAMINOPHEN 5; 325 MG/1; MG/1
1 TABLET ORAL EVERY 6 HOURS PRN
Status: DISCONTINUED | OUTPATIENT
Start: 2024-07-10 | End: 2024-07-10 | Stop reason: HOSPADM

## 2024-07-10 RX ORDER — ATORVASTATIN CALCIUM 40 MG/1
40 TABLET, FILM COATED ORAL NIGHTLY
Qty: 30 TABLET | Refills: 3 | Status: SHIPPED | OUTPATIENT
Start: 2024-07-10 | End: 2024-07-15 | Stop reason: SDUPTHER

## 2024-07-10 RX ADMIN — LEVOTHYROXINE SODIUM 112 MCG: 0.11 TABLET ORAL at 05:46

## 2024-07-10 RX ADMIN — ASPIRIN 81 MG: 81 TABLET, CHEWABLE ORAL at 08:31

## 2024-07-10 RX ADMIN — METOPROLOL TARTRATE 25 MG: 25 TABLET, FILM COATED ORAL at 08:31

## 2024-07-10 RX ADMIN — ACETAMINOPHEN 325MG 650 MG: 325 TABLET ORAL at 05:47

## 2024-07-10 RX ADMIN — LISINOPRIL 20 MG: 20 TABLET ORAL at 08:31

## 2024-07-10 RX ADMIN — SODIUM CHLORIDE, PRESERVATIVE FREE 10 ML: 5 INJECTION INTRAVENOUS at 08:33

## 2024-07-10 RX ADMIN — HYDROCODONE BITARTRATE AND ACETAMINOPHEN 1 TABLET: 5; 325 TABLET ORAL at 08:31

## 2024-07-10 ASSESSMENT — PAIN SCALES - GENERAL
PAINLEVEL_OUTOF10: 6
PAINLEVEL_OUTOF10: 0
PAINLEVEL_OUTOF10: 0
PAINLEVEL_OUTOF10: 4

## 2024-07-10 ASSESSMENT — PAIN DESCRIPTION - DESCRIPTORS
DESCRIPTORS: ACHING;DISCOMFORT;TIGHTNESS
DESCRIPTORS: SQUEEZING

## 2024-07-10 ASSESSMENT — PAIN DESCRIPTION - LOCATION
LOCATION: CHEST
LOCATION: CHEST

## 2024-07-10 ASSESSMENT — PAIN - FUNCTIONAL ASSESSMENT: PAIN_FUNCTIONAL_ASSESSMENT: ACTIVITIES ARE NOT PREVENTED

## 2024-07-10 NOTE — PLAN OF CARE
Problem: Discharge Planning  Goal: Discharge to home or other facility with appropriate resources  7/10/2024 1026 by Bibiana Russo, RN  Outcome: Completed  7/10/2024 0850 by Bibiana Russo RN  Outcome: Progressing  7/10/2024 0226 by Jacobo Colindres RN  Outcome: Progressing  Flowsheets (Taken 7/9/2024 1936)  Discharge to home or other facility with appropriate resources:   Identify barriers to discharge with patient and caregiver   Arrange for needed discharge resources and transportation as appropriate   Identify discharge learning needs (meds, wound care, etc)     Problem: Pain  Goal: Verbalizes/displays adequate comfort level or baseline comfort level  7/10/2024 1026 by Bibiana Russo RN  Outcome: Completed  7/10/2024 0850 by Bibiana Russo RN  Outcome: Progressing  7/10/2024 0226 by Jacobo Colindres RN  Outcome: Progressing  Flowsheets (Taken 7/9/2024 1936)  Verbalizes/displays adequate comfort level or baseline comfort level:   Encourage patient to monitor pain and request assistance   Assess pain using appropriate pain scale   Administer analgesics based on type and severity of pain and evaluate response   Implement non-pharmacological measures as appropriate and evaluate response

## 2024-07-10 NOTE — DISCHARGE INSTR - COC
Continuity of Care Form    Patient Name: Sarah Thapa   :  1960  MRN:  [id number]    Admit date:  2024  Discharge date:  ***    Code Status Order: Full Code   Advance Directives:   Advance Care Flowsheet Documentation       Date/Time Healthcare Directive Type of Healthcare Directive Copy in Chart Healthcare Agent Appointed Healthcare Agent's Name Healthcare Agent's Phone Number    24 0824 Yes, patient has an advance directive for healthcare treatment Durable power of  for health care Yes, copy in chart -- -- --    24 0541 Yes, patient has an advance directive for healthcare treatment Durable power of  for health care Yes, copy in chart Healthcare power of  Spouse --            Admitting Physician:  Daniel Abraham MD  PCP: Marvel Arceo DO    Discharging Nurse: ***  Discharging Hospital Unit/Room#: I6W-2016/4108-01  Discharging Unit Phone Number: ***    Emergency Contact:   Extended Emergency Contact Information  Primary Emergency Contact: ElierIbrahima  Address: [address] [place] [address]  Home Phone: [phone]  Mobile Phone: [phone]  Relation: Spouse  Secondary Emergency Contact: ElierMary alicea   [facility]  Home Phone: [phone]  Relation: Child    Past Surgical History:  Past Surgical History:   Procedure Laterality Date    ACHILLES TENDON SURGERY Left 2021    EXCISION POSTERIOR CALCANEAL EXOSTOSIS LEFT, DEBRIDEMENT AND REPAIR LEFT ACHILLES performed by Austin Duarte DPM at [facility] OR    BRONCHOSCOPY  2017    CHOLECYSTECTOMY, LAPAROSCOPIC  10/12/2016    with milena / Dr. Archibald.     FOOT SURGERY Left     Left cyst removal    HEEL SPUR SURGERY Left 2021    Dr. Duarte    HYSTERECTOMY (CERVIX STATUS UNKNOWN)  1987    Partial    SHOULDER SURGERY      Right x 3 / Rotator Cuff and Bone Spurs    SHOULDER SURGERY      Left / Rotator Cuff and Bone Spur    SINUS ENDOSCOPY

## 2024-07-10 NOTE — TELEPHONE ENCOUNTER
Care Transitions Initial Follow Up Call    Outreach made within 2 business days of discharge: Yes    Patient: Sarah Thapa Patient : 1960   MRN: [id number]  Reason for Admission: There are no discharge diagnoses documented for the most recent discharge.  Discharge Date: 7/10/24       Spoke with: Sarah     Discharge department/facility: [facility] Interactive Patient Contact:  Was patient able to fill all prescriptions: Yes  Was patient instructed to bring all medications to the follow-up visit: Yes  Is patient taking all medications as directed in the discharge summary? Yes  Does patient understand their discharge instructions: Yes  Does patient have questions or concerns that need addressed prior to 7-14 day follow up office visit: no    Scheduled appointment with PCP within 7-14 days    Follow Up  Future Appointments   Date Time Provider Department Center   7/15/2024  4:30 PM Marvel Arceo DO [place]  Priscila Moser MA

## 2024-07-10 NOTE — PLAN OF CARE
Problem: Discharge Planning  Goal: Discharge to home or other facility with appropriate resources  7/10/2024 0850 by Bibiana Russo, RN  Outcome: Progressing  7/10/2024 0226 by Jacobo Colindres RN  Outcome: Progressing  Flowsheets (Taken 7/9/2024 1936)  Discharge to home or other facility with appropriate resources:   Identify barriers to discharge with patient and caregiver   Arrange for needed discharge resources and transportation as appropriate   Identify discharge learning needs (meds, wound care, etc)     Problem: Pain  Goal: Verbalizes/displays adequate comfort level or baseline comfort level  7/10/2024 0850 by Bibiana Russo, RN  Outcome: Progressing  7/10/2024 0226 by Jacobo Colindres RN  Outcome: Progressing  Flowsheets (Taken 7/9/2024 1936)  Verbalizes/displays adequate comfort level or baseline comfort level:   Encourage patient to monitor pain and request assistance   Assess pain using appropriate pain scale   Administer analgesics based on type and severity of pain and evaluate response   Implement non-pharmacological measures as appropriate and evaluate response

## 2024-07-10 NOTE — PLAN OF CARE
Problem: Discharge Planning  Goal: Discharge to home or other facility with appropriate resources  Outcome: Progressing  Flowsheets (Taken 7/9/2024 1936)  Discharge to home or other facility with appropriate resources:   Identify barriers to discharge with patient and caregiver   Arrange for needed discharge resources and transportation as appropriate   Identify discharge learning needs (meds, wound care, etc)     Problem: Pain  Goal: Verbalizes/displays adequate comfort level or baseline comfort level  Outcome: Progressing  Flowsheets (Taken 7/9/2024 1936)  Verbalizes/displays adequate comfort level or baseline comfort level:   Encourage patient to monitor pain and request assistance   Assess pain using appropriate pain scale   Administer analgesics based on type and severity of pain and evaluate response   Implement non-pharmacological measures as appropriate and evaluate response

## 2024-07-10 NOTE — DISCHARGE SUMMARY
V2.0  Discharge Summary    Name:  Sarah Thapa /Age/Sex: 1960 (63 y.o. female)   Admit Date: 2024  Discharge Date: 7/10/24    MRN & CSN:  [id number] & [id number] Encounter Date and Time 7/10/24 12:01 PM EDT    Attending:  No att. providers found Discharging Provider: Daniel Abraham MD       Hospital Course:     Brief HPI: Sarah Thapa is a 63 y.o. female with history of psoriatic arthritis, hypothyroidism, hyperlipidemia presents emergency room with chest pain.  The patient has been having intermittent episodes of substernal chest pain/pressure over the past several months but was recently..  Pain across both at rest and with exertion... She had an episode last night that woke her up from sleep as well as this morning lasting 30 minutes prompting her to come to the ED..  She has no associated fevers, chills or rigors.  No palpitations, dyspnea.  Endorses ongoing fatigue for the past 1 month..  No fevers, night sweats, weight loss  In the ED, she was noted to have elevated blood pressures SBP 150s to 180s..  No prior history of hypertension  EKG and troponins negative for ischemia, chest x-ray was clear  CTA chest showed no PE/pneumonia, very subtle apical nodular infiltrate      Noted to have epicardial, angel hepatis and retroperitoneal lymphadenopathy plus splenomegaly    Brief Problem Based Course:   She was treated for the following  Atypical chest pain/unspecified  Patient reports intermittent substernal chest pain/pressure for months..       Chest x-ray, CTA chest with no acute findings.  EKG and troponins negative  Stress test negative  - EGD with no acute issues  Continue as needed.Analgesics, outpatient follow-up with PCP     Elevated blood pressure without prior history of hypertension, likely essential hypertension  Blood pressures persistently on admission..  BP improved with lisinopril and metoprolol-close follow-up with PCP recommended     Abnormal CT  Splenomegaly,  epicardial, angel hepatis, retroperitoneum lymphadenopathy, subtle apical lung nodules  hematology/oncology consult appreciated  S/p EGD with EUS/FNA 7/9  1.  Normal esophageal mucosa  2.  Normal gastroesophageal junction  3.  Normal gastric mucosa  4.  Normal duodenal mucosa  5.  Normal ampulla of Vater.  6.  Conglomerate of abnormally enlarged, hypoechoic periportal and celiac lymph nodes.  Fine-needle biopsies obtained from periportal lymph nodes.  Initial interpretation showing small lymphocytes.  Specimen sent for pathology and flow cytometry.      Follow-up with oncology outpatient    Psoriatic arthritis--on Otezla/meloxicam     Hypothyroidism--on Synthroid-     Hypokalemia--repleted    Hyperlipidemia-statin initiated      The patient expressed appropriate understanding of, and agreement with the discharge recommendations, medications, and plan.     Consults this admission:  IP CONSULT TO ONCOLOGY  IP CONSULT TO GI  IP CONSULT TO GI    Discharge Diagnosis:   Chest pain        Discharge Instruction:   Follow up appointments:   Primary care physician: Marvel Arceo DO within 1 week  Diet: regular diet   Activity: activity as tolerated  Disposition: Discharged to:   [x]Home, [][facility], []SNF, []Acute Rehab, []Hospice   Condition on discharge: Stable  Labs and Tests to be Followed up as an outpatient by PCP or Specialist:     Discharge Medications:        Medication List        START taking these medications      atorvastatin 40 MG tablet  Commonly known as: LIPITOR  Take 1 tablet by mouth nightly     HYDROcodone-acetaminophen 5-325 MG per tablet  Commonly known as: NORCO  Take 1 tablet by mouth every 6 hours as needed for Pain for up to 3 days. Max Daily Amount: 4 tablets     lisinopril 20 MG tablet  Commonly known as: PRINIVIL;ZESTRIL  Take 1 tablet by mouth daily  Start taking on: July 11, 2024     metoprolol tartrate 25 MG tablet  Commonly known as: LOPRESSOR  Take 1 tablet by mouth 2 times daily

## 2024-07-10 NOTE — PROGRESS NOTES
Pt continues to complain of midsternal  to epigastric chest pain intermittently . Has received Tylenol as needed. States it does help ease the discomfort. Discomfort is exacerbated with inspiration.

## 2024-07-10 NOTE — PROGRESS NOTES
ONCOLOGY HEMATOLOGY CARE PROGRESS NOTE      SUBJECTIVE:  Patient still having substernal chest pain.  She denies any headaches or any new bone aches    ROS:     Constitutional:  No weight loss, No fever, No chills, No night sweats.  Energy level good.  Eyes:  No impairment or change in vision  ENT / Mouth:  No pain, abnormal ulceration, bleeding, nasal drip or change in voice or hearing  Cardiovascular:  No chest pain, palpitations, new edema, or calf discomfort  Respiratory:  No pain, hemoptysis, change to breathing  Breast:  No pain, discharge, change in appearance or texture  Gastrointestinal:  No pain, cramping, jaundice, change to eating and bowel habits  Urinary:  No pain, bleeding or change in continence  Genitalia: No pain, bleeding or discharge  Musculoskeletal:  No redness, pain, edema or weakness  Skin:  No pruritus, rash, change to nodules or lesions  Neurologic:  No discomfort, change in mental status, speech, sensory or motor activity  Psychiatric:  No change in concentration or change to affect or mood  Endocrine:  No hot flashes, increased thirst, or change to urine production  Hematologic: No petechiae, ecchymosis or bleeding  Lymphatic:  No lymphadenopathy or lymphedema  Allergy / Immunologic:  No eczema, hives, frequent or recurrent infections    OBJECTIVE        Physical    VITALS:  Patient Vitals for the past 24 hrs:   BP Temp Temp src Pulse Resp SpO2 Weight   07/10/24 0417 -- -- -- -- -- -- 93 kg (205 lb)   07/09/24 1936 (!) 145/88 98.1 °F (36.7 °C) Oral 72 17 94 % --   07/09/24 1645 (!) 151/86 97.5 °F (36.4 °C) Oral 63 18 93 % --   07/09/24 1630 (!) 144/85 -- -- 64 20 96 % --   07/09/24 1625 133/81 -- -- 67 26 97 % --   07/09/24 1623 124/80 -- -- 65 20 98 % --   07/09/24 1620 124/80 97 °F (36.1 °C) Temporal 70 18 98 % --   07/09/24 1329 (!) 158/87 98 °F (36.7 °C) Temporal 63 16 96 % --   07/09/24 1106 136/88 98.4 °F (36.9 °C) Oral 55 16 95 % --   07/09/24  0841 -- -- -- -- -- 96 % --   07/09/24 0815 (!) 155/80 -- -- 58 -- -- --       24HR INTAKE/OUTPUT:    Intake/Output Summary (Last 24 hours) at 7/10/2024 0758  Last data filed at 7/9/2024 1616  Gross per 24 hour   Intake 500 ml   Output --   Net 500 ml       CONSTITUTIONAL: awake, alert, cooperative, no apparent distress   EYES: pupils equal, round and reactive to light, sclera clear and conjunctiva normal  ENT: Normocephalic, without obvious abnormality, atraumatic  NECK: supple, symmetrical, no jugular venous distension and no carotid bruits   HEMATOLOGIC/LYMPHATIC: no cervical, supraclavicular or axillary lymphadenopathy   LUNGS: no increased work of breathing and clear to auscultation   CARDIOVASCULAR: regular rate and rhythm, normal S1 and S2, no murmur noted  ABDOMEN: normal bowel sounds x 4, soft, non-distended, non-tender, no masses palpated, no hepatosplenomgaly   MUSCULOSKELETAL: full range of motion noted, tone is normal  NEUROLOGIC: awake, alert, oriented to name, place and time. Motor skills grossly intact.   SKIN: Normal skin color, texture, turgor and no jaundice. appears intact   EXTREMITIES: no LE edema     DATA:  CBC:    Recent Labs     07/10/24  0520 07/09/24  0158 07/08/24  0514 07/07/24  1126   WBC 8.9 8.4 7.9 8.5   NEUTROABS 4.6 3.9  --  5.2   LYMPHOPCT 26.3 29.4  --  19.6   RBC 3.85* 3.65* 3.81* 4.15   HGB 11.8* 11.2* 11.6* 12.5   HCT 34.5* 33.2* 34.0* 37.4   MCV 89.6 90.9 89.3 90.1   MCH 30.6 30.7 30.5 30.1   MCHC 34.1 33.8 34.1 33.4   RDW 13.5 13.1 13.3 13.1    271 268 305       PT/INR:  No results for input(s): \"INR\" in the last 720 hours.    Invalid input(s): \"PROT\"  PTT:  No results for input(s): \"APTT\" in the last 720 hours.    CMP:    Recent Labs     07/10/24  0520 07/09/24  0825 07/09/24  0157 07/08/24  0514 07/07/24  1126     --  140  --  141   K 3.6 3.8 3.3*  --  3.4*     --  107  --  106   CO2 24  --  24  --  23   GLUCOSE 92  --  114*  --  85   BUN 15  --  17  --

## 2024-07-12 PROBLEM — M25.541 ARTHRALGIA OF HAND, RIGHT: Status: ACTIVE | Noted: 2024-07-12

## 2024-07-12 PROBLEM — I10 ESSENTIAL HYPERTENSION: Status: ACTIVE | Noted: 2024-07-12

## 2024-07-12 NOTE — PROGRESS NOTES
Subjective:      Patient ID: Sarah Thapa is a 63 y.o. female.    [facility]    Hospital Follow Up / Atypical Chest Pain / Hypertension / Epicardial Lincoln Hepatis / Splenomegaly / Retroperitoneal Lymphadenopathy / Apical Lung Nodules / Fatigue:  Patient presented to ER on 7-7-24 with substernal chest pain that awoke her from sleep.  She has had intermittent chest pain for the last couple months.  D-Dimer was elevated.  A CTA was done and was negative for PE, but did show multiple 2-3 mm bilateral apical nodules, epicardial lincoln hepatis and retroperitoneal lymphadenopathy and splenomegaly (possible lymphoma).  She had a negative treadmill GXT.  She was admitted for further work up.  She was seen by GI (Dr. Nj) and underwent upper endoscopy with fine needle biopsy of periportal lymph nodes (pathology pending).  She was found to have a normal esophagus, stomach and duodenum.  She was seen by Hematology / Oncology (Dr. Best) who feels that her chest pain is likely from the epicardial lymph node involvement.  She was started on Metoprolol and Lisinopril for elevated blood pressure.  She was also started on Lipitor for elevated cholesterol.   She was discharged to home on 7-10-24 on [place] 5-325 every 6 hrs as needed for pain.  She only took one Norco and no longer has the pain.   She saw Dr. Best today and was told that the biopsy results were negative for Lymphoma.  The plan is to repeat the lung and abdominal CT scans in 7 weeks and follow up with Dr. Best the following week.         Review of Systems    /82   Ht 1.702 m (5' 7\")   Wt 93 kg (205 lb)   BMI 32.11 kg/m²    Objective:   Physical Exam  Constitutional:       General: She is not in acute distress.     Appearance: She is well-developed.   HENT:      Head: Normocephalic.      Right Ear: External ear normal.      Left Ear: External ear normal.      Mouth/Throat:      Pharynx: No oropharyngeal exudate.   Neck:      Thyroid: No

## 2024-07-15 ENCOUNTER — OFFICE VISIT (OUTPATIENT)
Dept: FAMILY MEDICINE CLINIC | Age: 64
End: 2024-07-15

## 2024-07-15 VITALS
DIASTOLIC BLOOD PRESSURE: 82 MMHG | WEIGHT: 205 LBS | SYSTOLIC BLOOD PRESSURE: 124 MMHG | HEIGHT: 67 IN | BODY MASS INDEX: 32.18 KG/M2

## 2024-07-15 DIAGNOSIS — R16.1 SPLENOMEGALY: ICD-10-CM

## 2024-07-15 DIAGNOSIS — R53.83 OTHER FATIGUE: ICD-10-CM

## 2024-07-15 DIAGNOSIS — Z09 HOSPITAL DISCHARGE FOLLOW-UP: ICD-10-CM

## 2024-07-15 DIAGNOSIS — R07.89 ATYPICAL CHEST PAIN: Primary | ICD-10-CM

## 2024-07-15 DIAGNOSIS — R91.1 NODULE OF APEX OF RIGHT LUNG: ICD-10-CM

## 2024-07-15 DIAGNOSIS — I10 ESSENTIAL HYPERTENSION: ICD-10-CM

## 2024-07-15 DIAGNOSIS — R59.0 RETROPERITONEAL LYMPHADENOPATHY: ICD-10-CM

## 2024-07-15 RX ORDER — LISINOPRIL 20 MG/1
20 TABLET ORAL DAILY
Qty: 90 TABLET | Refills: 1 | Status: SHIPPED | OUTPATIENT
Start: 2024-07-15

## 2024-07-15 RX ORDER — ATORVASTATIN CALCIUM 40 MG/1
40 TABLET, FILM COATED ORAL NIGHTLY
Qty: 90 TABLET | Refills: 1 | Status: SHIPPED | OUTPATIENT
Start: 2024-07-15

## 2024-07-15 SDOH — ECONOMIC STABILITY: FOOD INSECURITY: WITHIN THE PAST 12 MONTHS, THE FOOD YOU BOUGHT JUST DIDN'T LAST AND YOU DIDN'T HAVE MONEY TO GET MORE.: NEVER TRUE

## 2024-07-15 SDOH — ECONOMIC STABILITY: HOUSING INSECURITY
IN THE LAST 12 MONTHS, WAS THERE A TIME WHEN YOU DID NOT HAVE A STEADY PLACE TO SLEEP OR SLEPT IN A SHELTER (INCLUDING NOW)?: NO

## 2024-07-15 SDOH — ECONOMIC STABILITY: FOOD INSECURITY: WITHIN THE PAST 12 MONTHS, YOU WORRIED THAT YOUR FOOD WOULD RUN OUT BEFORE YOU GOT MONEY TO BUY MORE.: NEVER TRUE

## 2024-07-15 SDOH — ECONOMIC STABILITY: INCOME INSECURITY: HOW HARD IS IT FOR YOU TO PAY FOR THE VERY BASICS LIKE FOOD, HOUSING, MEDICAL CARE, AND HEATING?: NOT HARD AT ALL

## 2024-07-22 NOTE — PROGRESS NOTES
Physician Progress Note      PATIENT:               CARSON SMITH  [facility] #:                  [id number]  :                       1960  ADMIT DATE:       2024 11:02 AM  DISCH DATE:        7/10/2024 10:48 AM  RESPONDING  PROVIDER #:        Daniel Otoole MD          QUERY TEXT:    Pt admitted with chest pain.  Noted documentation of \" I do feel that her pain   is likely due to the epicardial lymph node involvement.  There was some   suggestion that she may have arcuate ligament syndrome.\" by oncology on 07/10.    If possible, please document in progress notes and discharge summary:    The medical record reflects the following:  Risk Factors: hypothyroidism, psoriatic arthritis  Clinical Indicators:  Oncology 07/10-  \"  Lymphadenopathy  splenomegaly......I do feel that her pain is likely due to the epicardial   lymph node involvement.  There was some suggestion that she may have arcuate   ligament syndrome.\"  CTA Chest/ABD/Pelvis -  \"4.Splenomegaly with diffusely heterogeneous attenuation.  5.Sigmoid and colonic diverticulosis.No evidence of diverticulitis.  6.Focal luminal narrowing of the celiac trunk at its origin.Please  correlate with clinical symptoms of arcuate ligament syndrome.\"  Treatment: CXR, CTA, EKG, stress test, EGD with FNA, serial troponins,   supportive care    Thank you,  Elba Medina, RN, BSN, CRCR  Options provided:  -- Chest pain likely due to epicardial lymph node involvement and possible   arcuate ligament syndrome  -- Chest pain likely due to other, and unrelated to epicardial lymph node   involvement and possible arcuate ligament syndrome, Please specify likely   cause of chest pain  -- Other - I will add my own diagnosis  -- Disagree - Not applicable / Not valid  -- Disagree - Clinically unable to determine / Unknown  -- Refer to Clinical Documentation Reviewer    PROVIDER RESPONSE TEXT:    Unspecified chest pain    Query created by: Elba Medina on 2024 8:17

## 2024-07-26 ENCOUNTER — TELEPHONE (OUTPATIENT)
Dept: FAMILY MEDICINE CLINIC | Age: 64
End: 2024-07-26

## 2024-07-26 NOTE — TELEPHONE ENCOUNTER
Patient called stating she placed on BP medications. She has been camping and just got home and checked her /102  She states her BP has been increasing since starting meds. States no energy.    She states she has not missed any doses. Please advise.

## 2024-07-26 NOTE — TELEPHONE ENCOUNTER
The lisinopril and metoprolol would not increase her blood pressure, but it may take a month or more to adequately control her BP.  Her last office BP reading was normal.   I would just advise her to continue the medications and monitor her BP for now.

## 2024-07-29 ENCOUNTER — HOSPITAL ENCOUNTER (OUTPATIENT)
Dept: CT IMAGING | Age: 64
Discharge: HOME | End: 2024-07-29
Attending: INTERNAL MEDICINE
Payer: COMMERCIAL

## 2024-07-29 DIAGNOSIS — R59.1 LYMPHADENOPATHY: ICD-10-CM

## 2024-07-29 PROCEDURE — 71260 CT THORAX DX C+: CPT

## 2024-07-29 PROCEDURE — 6360000004 HC RX CONTRAST MEDICATION: Performed by: INTERNAL MEDICINE

## 2024-07-29 RX ADMIN — DIATRIZOATE MEGLUMINE AND DIATRIZOATE SODIUM 30 ML: 660; 100 LIQUID ORAL; RECTAL at 06:45

## 2024-07-29 RX ADMIN — IOMEPROL INJECTION 100 ML: 714 INJECTION, SOLUTION INTRAVASCULAR at 07:56

## 2024-08-13 ASSESSMENT — ENCOUNTER SYMPTOMS: SHORTNESS OF BREATH: 0

## 2024-08-13 NOTE — PROGRESS NOTES
Subjective:      Patient ID: Sarah Thapa is a 63 y.o. female.    Hypertension  This is a chronic problem. The current episode started more than 1 month ago. The problem has been gradually improving since onset. The problem is controlled. Associated symptoms include chest pain and peripheral edema. Pertinent negatives include no palpitations or shortness of breath. (She had a GXT in the hospital in July 2024 and it was normal. ) Agents associated with hypertension include thyroid hormones and NSAIDs. Risk factors for coronary artery disease include dyslipidemia, family history, obesity and post-menopausal state. Past treatments include ACE inhibitors and beta blockers. The current treatment provides significant improvement. There are no compliance problems.    Her BP is running occasionally high at home.      Hyperlipidemia:  Patient is tolerating and compliant with Lipitor 40 mg daily.   She is fasting today for a lipid recheck.      Hypothyroidism:  Patient is tolerating and compliant with Synthroid 112 mcg daily.   Her TSH was 2.96 on 7-7-24.      Psoriatic Arthritis:  Patient sees Dr. Mitchell and is on Otezla BID, and Mobic 15 mg daily.      Allergic Rhinitis:  Patient takes Zyrtec 10 mg daily as needed and feels that the medication works well to control her allergy symptoms.      Retroperitoneal Lymphadenopathy / Splenomegaly / Apical Lung Nodules:  Patient was admitted 7-7-24 for chest pain.  Work up included CT scans which showed retroperitoneal lymphadenopathy and splenomegaly.  She had an EGD per Dr. Nj with a biopsy of the periportal lymph nodes that was negative for lymphoma.   The scans also showed apical lung nodules.  She has followed up with Dr. Best and a repeat chest CT showed stable nodules and lymphadenopathy and splenomegaly.   She will follow up with ID (Dr. Fairchild) next week.  She will have another chest CT in September and follow up with Dr. Best.      Review of

## 2024-08-15 ENCOUNTER — OFFICE VISIT (OUTPATIENT)
Dept: FAMILY MEDICINE CLINIC | Age: 64
End: 2024-08-15
Payer: COMMERCIAL

## 2024-08-15 VITALS — WEIGHT: 207.4 LBS | SYSTOLIC BLOOD PRESSURE: 170 MMHG | BODY MASS INDEX: 32.48 KG/M2 | DIASTOLIC BLOOD PRESSURE: 96 MMHG

## 2024-08-15 DIAGNOSIS — I10 ESSENTIAL HYPERTENSION: Primary | ICD-10-CM

## 2024-08-15 DIAGNOSIS — R91.8 LUNG NODULES: ICD-10-CM

## 2024-08-15 DIAGNOSIS — R59.0 RETROPERITONEAL LYMPHADENOPATHY: ICD-10-CM

## 2024-08-15 DIAGNOSIS — L40.50 PSORIATIC ARTHRITIS (HCC): ICD-10-CM

## 2024-08-15 DIAGNOSIS — E03.9 HYPOTHYROIDISM (ACQUIRED): ICD-10-CM

## 2024-08-15 DIAGNOSIS — J30.1 CHRONIC SEASONAL ALLERGIC RHINITIS DUE TO POLLEN: ICD-10-CM

## 2024-08-15 DIAGNOSIS — E78.2 HYPERLIPIDEMIA, MIXED: ICD-10-CM

## 2024-08-15 DIAGNOSIS — R16.1 SPLENOMEGALY: ICD-10-CM

## 2024-08-15 LAB
ALT SERPL-CCNC: 22 U/L (ref 10–40)
ANION GAP SERPL CALCULATED.3IONS-SCNC: 10 MMOL/L (ref 3–16)
AST SERPL-CCNC: 23 U/L (ref 15–37)
BUN SERPL-MCNC: 13 MG/DL (ref 7–20)
CALCIUM SERPL-MCNC: 10.6 MG/DL (ref 8.3–10.6)
CHLORIDE SERPL-SCNC: 101 MMOL/L (ref 99–110)
CHOLEST SERPL-MCNC: 118 MG/DL (ref 0–199)
CO2 SERPL-SCNC: 25 MMOL/L (ref 21–32)
CREAT SERPL-MCNC: 1 MG/DL (ref 0.6–1.2)
GFR SERPLBLD CREATININE-BSD FMLA CKD-EPI: 63 ML/MIN/{1.73_M2}
GLUCOSE SERPL-MCNC: 86 MG/DL (ref 70–99)
HDLC SERPL-MCNC: 31 MG/DL (ref 40–60)
LDLC SERPL CALC-MCNC: 56 MG/DL
POTASSIUM SERPL-SCNC: 3.4 MMOL/L (ref 3.5–5.1)
SODIUM SERPL-SCNC: 136 MMOL/L (ref 136–145)
TRIGL SERPL-MCNC: 153 MG/DL (ref 0–150)
VLDLC SERPL CALC-MCNC: 31 MG/DL

## 2024-08-15 PROCEDURE — 3077F SYST BP >= 140 MM HG: CPT | Performed by: FAMILY MEDICINE

## 2024-08-15 PROCEDURE — 99214 OFFICE O/P EST MOD 30 MIN: CPT | Performed by: FAMILY MEDICINE

## 2024-08-15 PROCEDURE — 3080F DIAST BP >= 90 MM HG: CPT | Performed by: FAMILY MEDICINE

## 2024-08-15 PROCEDURE — 36415 COLL VENOUS BLD VENIPUNCTURE: CPT | Performed by: FAMILY MEDICINE

## 2024-08-15 RX ORDER — LOSARTAN POTASSIUM AND HYDROCHLOROTHIAZIDE 25; 100 MG/1; MG/1
1 TABLET ORAL DAILY
Qty: 90 TABLET | Refills: 5 | Status: SHIPPED | OUTPATIENT
Start: 2024-08-15

## 2024-08-15 RX ORDER — POTASSIUM CHLORIDE 750 MG/1
10 TABLET, EXTENDED RELEASE ORAL DAILY
Qty: 90 TABLET | Refills: 0 | Status: SHIPPED | OUTPATIENT
Start: 2024-08-15

## 2024-08-15 RX ORDER — POTASSIUM CHLORIDE 750 MG/1
10 TABLET, EXTENDED RELEASE ORAL DAILY
Qty: 30 TABLET | Refills: 5 | Status: SHIPPED | OUTPATIENT
Start: 2024-08-15 | End: 2024-08-15

## 2024-08-15 NOTE — TELEPHONE ENCOUNTER
Last office visit 8/15/2024       Next office visit scheduled Visit date not found    Requested Prescriptions     Pending Prescriptions Disp Refills    potassium chloride (KLOR-CON M) 10 MEQ extended release tablet [Pharmacy Med Name: POTASSIUM CL MICRO 10MEQ ER TABS] 90 tablet      Sig: Take 1 tablet by mouth daily

## 2024-08-21 ENCOUNTER — OFFICE VISIT (OUTPATIENT)
Dept: INFECTIOUS DISEASES | Age: 64
End: 2024-08-21
Payer: COMMERCIAL

## 2024-08-21 VITALS
HEIGHT: 65 IN | SYSTOLIC BLOOD PRESSURE: 126 MMHG | DIASTOLIC BLOOD PRESSURE: 78 MMHG | TEMPERATURE: 96.1 F | WEIGHT: 200.1 LBS | BODY MASS INDEX: 33.34 KG/M2 | HEART RATE: 70 BPM | OXYGEN SATURATION: 97 %

## 2024-08-21 DIAGNOSIS — R50.9 FUO (FEVER OF UNKNOWN ORIGIN): ICD-10-CM

## 2024-08-21 DIAGNOSIS — R59.0 LYMPHADENOPATHY, ABDOMINAL: ICD-10-CM

## 2024-08-21 DIAGNOSIS — R16.1 SPLENOMEGALY: ICD-10-CM

## 2024-08-21 DIAGNOSIS — R59.1 GENERALIZED LYMPHADENOPATHY: ICD-10-CM

## 2024-08-21 DIAGNOSIS — R93.5 ABNORMAL CT OF THE ABDOMEN: ICD-10-CM

## 2024-08-21 DIAGNOSIS — L40.50 PSORIATIC ARTHRITIS (HCC): ICD-10-CM

## 2024-08-21 DIAGNOSIS — R91.8 PULMONARY NODULES: ICD-10-CM

## 2024-08-21 DIAGNOSIS — R59.0 LYMPHADENOPATHY, ABDOMINAL: Primary | ICD-10-CM

## 2024-08-21 LAB — ERYTHROCYTE [SEDIMENTATION RATE] IN BLOOD BY WESTERGREN METHOD: 44 MM/HR (ref 0–30)

## 2024-08-21 PROCEDURE — 99204 OFFICE O/P NEW MOD 45 MIN: CPT | Performed by: INTERNAL MEDICINE

## 2024-08-21 PROCEDURE — 3074F SYST BP LT 130 MM HG: CPT | Performed by: INTERNAL MEDICINE

## 2024-08-21 PROCEDURE — 3078F DIAST BP <80 MM HG: CPT | Performed by: INTERNAL MEDICINE

## 2024-08-21 NOTE — PROGRESS NOTES
Infectious Diseases Clinic Consult Note       Reason for Consult:  Evaluation of Lymph adenopathy and splenomegaly   Requesting Physician:        Primary Care Physician:  Marvel Arceo DO  History Obtained From:   Patient , Medical Records     CHIEF COMPLAINT:    Chief Complaint   Patient presents with    New Patient     Lymphadenopathy/Fevers -nk  Referred by: Dr. Pankaj Best MD       HISTORY OF PRESENT ILLNESS: 63 y.o. female history of psoriatic arthritis currently on Otezla, hypothyroidism, hyperlipidemia was recently admitted to [facility] with chest pain and chest pressure workup included CT chest and CT abdomen pelvis noted to have retroperitoneal as well as angel hepatis adenopathy patient underwent a esophagogastroduodenoscopy with the EUS noted to have abnormal study there was periportal and celiac lymphadenopathy was noted and this underwent fine-needle aspiration showing small lymphocytes, cytology no malignant cells noted, flow cytometry was negative, she was complaining of low-grade temperatures hence referred here for further evaluation.  CT chest abdomen pelvis report indicated pulmonary nodules borderline right hilar lymphadenopathy with retroperitoneal lymphadenopathy noted splenomegaly with multiple small nodules noted.  She denies any rashes no unusual exposures no traveling she reports some family history of lupus.  She is here with her  in the clinic today for further evaluation    Past Medical History:    Past Medical History:   Diagnosis Date    Arthralgia of hand, right     Chronic seasonal allergic rhinitis due to pollen     Edema of both legs     Essential hypertension     Hyperlipidemia, mixed     past hx BORDERLINE    Hypothyroidism (acquired)     Non morbid obesity due to excess calories     Psoriasis (a type of skin inflammation)     Psoriatic arthritis (HCC)        Past Surgical History:    Past Surgical History:   Procedure Laterality Date

## 2024-08-22 LAB
ANA SER QL IA: NEGATIVE
CRP SERPL-MCNC: 4 MG/L (ref 0–5.1)
CRYPTOC AG SER QL: NEGATIVE
HIV 1+2 AB+HIV1 P24 AG SERPL QL IA: NORMAL
HIV 2 AB SERPL QL IA: NORMAL
HIV1 AB SERPL QL IA: NORMAL
HIV1 P24 AG SERPL QL IA: NORMAL
LDH SERPL L TO P-CCNC: 186 U/L (ref 100–190)

## 2024-08-23 LAB
(1,3)-BETA-D-GLUCAN (FUNGITELL) INTERPRETATION: NEGATIVE
1,3 BETA GLUCAN SER-MCNC: <31 PG/ML
GALACTOMANNAN AG SERPL IA-ACNC: 0.05
GALACTOMANNAN AG SERPL QL IA: NEGATIVE
H CAPSUL AG UR IA-ACNC: NOT DETECTED NG/ML
H CAPSUL AG UR QL IA: NOT DETECTED
MISCELLANEOUS LAB TEST ORDER: NORMAL
T GONDII IGG SER-ACNC: 132 IU/ML
T GONDII IGM SER-ACNC: <3 AU/ML

## 2024-08-25 LAB
ASPERGILLUS AB SER QL ID: NOT DETECTED
B DERMAT AB SER QL ID: NOT DETECTED
B HENSELAE IGG TITR SER IF: NORMAL {TITER}
B HENSELAE IGM TITR SER IF: NORMAL {TITER}
COCCIDIOIDES AB SPEC QL ID: NOT DETECTED
H CAPSUL AB TITR SER ID: NOT DETECTED {TITER}

## 2024-08-26 LAB
H CAPSUL AG SER IA-ACNC: NOT DETECTED
H CAPSUL AG SER QL IA: NOT DETECTED

## 2024-09-03 ENCOUNTER — PATIENT MESSAGE (OUTPATIENT)
Dept: INFECTIOUS DISEASES | Age: 64
End: 2024-09-03

## 2024-09-03 NOTE — TELEPHONE ENCOUNTER
So far the tests I Send to check for any Bacterial infection or Fungal infection have come back Normal  We also checked test for Lupus screen was Negative  At this time testing in regards to infectious screen have been negative  Does she have any new symptoms to review    There was a plan for repeat CT scan per  would encourage to complete that and if any changes we may have to re look at things at this time we do not have any particular diagnosis

## 2024-09-05 PROBLEM — R59.0 LYMPHADENOPATHY, ABDOMINAL: Status: ACTIVE | Noted: 2024-09-05

## 2024-09-05 PROBLEM — R16.1 SPLENOMEGALY: Status: ACTIVE | Noted: 2024-09-05

## 2024-09-13 ENCOUNTER — HOSPITAL ENCOUNTER (OUTPATIENT)
Dept: CT IMAGING | Age: 64
Discharge: HOME | End: 2024-09-13
Attending: INTERNAL MEDICINE
Payer: COMMERCIAL

## 2024-09-13 DIAGNOSIS — R59.1 LYMPHADENOPATHY: ICD-10-CM

## 2024-09-13 PROCEDURE — 2500000003 HC RX 250 WO HCPCS: Performed by: INTERNAL MEDICINE

## 2024-09-13 PROCEDURE — 6360000004 HC RX CONTRAST MEDICATION: Performed by: INTERNAL MEDICINE

## 2024-09-13 PROCEDURE — 71260 CT THORAX DX C+: CPT

## 2024-09-13 RX ADMIN — BARIUM SULFATE 900 ML: 20 SUSPENSION ORAL at 10:30

## 2024-09-13 RX ADMIN — IOMEPROL INJECTION 100 ML: 714 INJECTION, SOLUTION INTRAVASCULAR at 11:38

## 2024-09-20 ENCOUNTER — HOSPITAL ENCOUNTER (INPATIENT)
Age: 64
LOS: 4 days | Discharge: HOME | DRG: 629 | End: 2024-09-24
Attending: INTERNAL MEDICINE | Admitting: INTERNAL MEDICINE
Payer: COMMERCIAL

## 2024-09-20 ENCOUNTER — HOSPITAL ENCOUNTER (OUTPATIENT)
Dept: WOMENS IMAGING | Age: 64
Discharge: HOME | End: 2024-09-20
Payer: COMMERCIAL

## 2024-09-20 VITALS — BODY MASS INDEX: 34.15 KG/M2 | HEIGHT: 64 IN | WEIGHT: 200 LBS

## 2024-09-20 DIAGNOSIS — R59.1 LYMPHADENOPATHY: ICD-10-CM

## 2024-09-20 DIAGNOSIS — N17.9 AKI (ACUTE KIDNEY INJURY) (HCC): Primary | ICD-10-CM

## 2024-09-20 DIAGNOSIS — E87.6 HYPOKALEMIA: ICD-10-CM

## 2024-09-20 DIAGNOSIS — E83.52 HYPERCALCEMIA: ICD-10-CM

## 2024-09-20 DIAGNOSIS — Z12.31 BREAST CANCER SCREENING BY MAMMOGRAM: ICD-10-CM

## 2024-09-20 PROBLEM — E87.8 ELECTROLYTE ABNORMALITY: Status: ACTIVE | Noted: 2024-09-20

## 2024-09-20 LAB
ALBUMIN SERPL-MCNC: 4.1 G/DL (ref 3.4–5)
ALBUMIN/GLOB SERPL: 1 {RATIO} (ref 1.1–2.2)
ALBUMIN: 4.1 G/DL (ref 3.5–5.7)
ALBUMIN: NORMAL G/DL
ALP BLD-CCNC: 60 IU/L (ref 35–135)
ALP BLD-CCNC: NORMAL IU/L
ALP SERPL-CCNC: 78 U/L (ref 40–129)
ALT SERPL-CCNC: 28 IU/L (ref 10–60)
ALT SERPL-CCNC: 31 U/L (ref 10–40)
ALT SERPL-CCNC: NORMAL IU/L
ANION GAP SERPL CALCULATED.3IONS-SCNC: 11 MMOL/L (ref 3–16)
ANION GAP SERPL CALCULATED.3IONS-SCNC: 8 MMOL/L (ref 4–16)
ANION GAP SERPL CALCULATED.3IONS-SCNC: NORMAL MMOL/L
AST SERPL-CCNC: 22 IU/L (ref 10–40)
AST SERPL-CCNC: 33 U/L (ref 15–37)
AST SERPL-CCNC: NORMAL IU/L
BACTERIA URNS QL MICRO: ABNORMAL /HPF
BASOPHILS # BLD: 0.2 K/UL (ref 0–0.2)
BASOPHILS NFR BLD: 2 %
BILIRUB SERPL-MCNC: 0.3 MG/DL (ref 0–1)
BILIRUB SERPL-MCNC: 0.5 MG/DL (ref 0–1.2)
BILIRUB UR QL STRIP.AUTO: NEGATIVE
BUN BLDV-MCNC: 20 MG/DL (ref 8–26)
BUN BLDV-MCNC: NORMAL MG/DL
BUN SERPL-MCNC: 21 MG/DL (ref 7–20)
C-REACTIVE PROTEIN WIDE RANGE: <5 MG/L
CALCIUM SERPL-MCNC: 13.1 MG/DL (ref 8.5–10.4)
CALCIUM SERPL-MCNC: 13.3 MG/DL (ref 8.3–10.6)
CALCIUM SERPL-MCNC: NORMAL MG/DL
CHLORIDE BLD-SCNC: 103 MMOL/L (ref 98–111)
CHLORIDE BLD-SCNC: NORMAL MMOL/L
CHLORIDE SERPL-SCNC: 101 MMOL/L (ref 99–110)
CLARITY UR: CLEAR
CO2 SERPL-SCNC: 25 MMOL/L (ref 21–32)
CO2: 28 MMOL/L (ref 21–31)
CO2: NORMAL MMOL/L
COLOR UR: YELLOW
CREAT SERPL-MCNC: 1.3 MG/DL (ref 0.6–1.2)
CREAT SERPL-MCNC: 1.51 MG/DL (ref 0.6–1.2)
CREAT SERPL-MCNC: NORMAL MG/DL
CREAT UR-MCNC: 44.3 MG/DL (ref 28–259)
CYCLIC CITRULLINATED PEPTIDE ANTIBODY IGG: 0.7 U/ML
DEPRECATED RDW RBC AUTO: 13.1 % (ref 12.4–15.4)
EGFR (CKD-EPI): 38 ML/MIN/1.73 M2
EGFR (CKD-EPI): NORMAL ML/MIN/1.73 M2
EOSINOPHIL # BLD: 0.5 K/UL (ref 0–0.6)
EOSINOPHIL NFR BLD: 4 %
EPI CELLS #/AREA URNS AUTO: 2 /HPF (ref 0–5)
GFR SERPLBLD CREATININE-BSD FMLA CKD-EPI: 46 ML/MIN/{1.73_M2}
GLUCOSE BLD-MCNC: 88 MG/DL (ref 70–99)
GLUCOSE BLD-MCNC: NORMAL MG/DL
GLUCOSE SERPL-MCNC: 88 MG/DL (ref 70–99)
GLUCOSE UR STRIP.AUTO-MCNC: NEGATIVE MG/DL
HCT VFR BLD AUTO: 34.1 % (ref 36–48)
HCT VFR BLD CALC: 32.9 % (ref 36–46)
HEMOGLOBIN: 11.4 G/DL (ref 12–15.2)
HGB BLD-MCNC: 11.6 G/DL (ref 12–16)
HGB UR QL STRIP.AUTO: NEGATIVE
HYALINE CASTS #/AREA URNS AUTO: 2 /LPF (ref 0–8)
KETONES UR STRIP.AUTO-MCNC: NEGATIVE MG/DL
LEUKOCYTE ESTERASE UR QL STRIP.AUTO: ABNORMAL
LYMPHOCYTES # BLD: 2.6 K/UL (ref 1–5.1)
LYMPHOCYTES NFR BLD: 22 %
MAGNESIUM SERPL-MCNC: 1.44 MG/DL (ref 1.8–2.4)
MCH RBC QN AUTO: 30.3 PG (ref 26–34)
MCH RBC QN AUTO: 30.4 PG (ref 27–33)
MCHC RBC AUTO-ENTMCNC: 34.1 G/DL (ref 31–36)
MCHC RBC AUTO-ENTMCNC: 34.5 G/DL (ref 32–36)
MCV RBC AUTO: 88.1 FL (ref 82–97)
MCV RBC AUTO: 89 FL (ref 80–100)
MONOCYTES # BLD: 1 K/UL (ref 0–1.3)
MONOCYTES NFR BLD: 9 %
MYELOCYTES NFR BLD MANUAL: 1 %
NEUTROPHILS # BLD: 7.3 K/UL (ref 1.7–7.7)
NEUTROPHILS NFR BLD: 61 %
NEUTS BAND NFR BLD MANUAL: 1 % (ref 0–7)
NITRITE UR QL STRIP.AUTO: NEGATIVE
PDW BLD-RTO: 13.5 % (ref 12.3–17)
PH UR STRIP.AUTO: 6.5 [PH] (ref 5–8)
PHOSPHATE SERPL-MCNC: 2.3 MG/DL (ref 2.5–4.9)
PLATELET # BLD AUTO: 306 K/UL (ref 135–450)
PLATELET # BLD: 302 THOU/MCL (ref 140–375)
PLATELET BLD QL SMEAR: ADEQUATE
PMV BLD AUTO: 7.5 FL (ref 7.4–11.5)
PMV BLD AUTO: 8.3 FL (ref 5–10.5)
POTASSIUM SERPL-SCNC: 3.1 MMOL/L (ref 3.5–5.1)
POTASSIUM SERPL-SCNC: 3.2 MMOL/L (ref 3.6–5.1)
POTASSIUM SERPL-SCNC: NORMAL MMOL/L
PROT SERPL-MCNC: 8.1 G/DL (ref 6.4–8.2)
PROT UR STRIP.AUTO-MCNC: NEGATIVE MG/DL
RBC # BLD AUTO: 3.83 M/UL (ref 4–5.2)
RBC # BLD: 3.74 MIL/MCL (ref 3.8–5.2)
RBC CLUMPS #/AREA URNS AUTO: 0 /HPF (ref 0–4)
RBC MORPH BLD: NORMAL
RHEUMATOID FACTOR: <10 IU/ML
SLIDE REVIEW: ABNORMAL
SODIUM BLD-SCNC: 139 MMOL/L (ref 135–145)
SODIUM BLD-SCNC: NORMAL MMOL/L
SODIUM SERPL-SCNC: 137 MMOL/L (ref 136–145)
SODIUM UR-SCNC: 48 MMOL/L
SP GR UR STRIP.AUTO: 1.01 (ref 1–1.03)
T4 FREE SERPL-MCNC: 1.8 NG/DL (ref 0.9–1.8)
TOTAL BILIRUBIN: NORMAL MG/DL
TOTAL PROTEIN: 7.7 G/DL (ref 6–8)
TOTAL PROTEIN: NORMAL G/DL
TSH SERPL DL<=0.005 MIU/L-ACNC: 5.94 UIU/ML (ref 0.27–4.2)
UA COMPLETE W REFLEX CULTURE PNL UR: ABNORMAL
UA DIPSTICK W REFLEX MICRO PNL UR: YES
URN SPEC COLLECT METH UR: ABNORMAL
UROBILINOGEN UR STRIP-ACNC: 0.2 E.U./DL
WBC # BLD AUTO: 11.6 K/UL (ref 4–11)
WBC # BLD: 9.8 THOU/MCL (ref 3.6–10.5)
WBC #/AREA URNS AUTO: 6 /HPF (ref 0–5)

## 2024-09-20 PROCEDURE — 84443 ASSAY THYROID STIM HORMONE: CPT

## 2024-09-20 PROCEDURE — 6360000002 HC RX W HCPCS: Performed by: PHYSICIAN ASSISTANT

## 2024-09-20 PROCEDURE — 1200000000 HC SEMI PRIVATE

## 2024-09-20 PROCEDURE — 80053 COMPREHEN METABOLIC PANEL: CPT

## 2024-09-20 PROCEDURE — 84300 ASSAY OF URINE SODIUM: CPT

## 2024-09-20 PROCEDURE — 77063 BREAST TOMOSYNTHESIS BI: CPT

## 2024-09-20 PROCEDURE — 6370000000 HC RX 637 (ALT 250 FOR IP): Performed by: PHYSICIAN ASSISTANT

## 2024-09-20 PROCEDURE — 83735 ASSAY OF MAGNESIUM: CPT

## 2024-09-20 PROCEDURE — 2580000003 HC RX 258: Performed by: PHYSICIAN ASSISTANT

## 2024-09-20 PROCEDURE — 85025 COMPLETE CBC W/AUTO DIFF WBC: CPT

## 2024-09-20 PROCEDURE — 82570 ASSAY OF URINE CREATININE: CPT

## 2024-09-20 PROCEDURE — 81001 URINALYSIS AUTO W/SCOPE: CPT

## 2024-09-20 PROCEDURE — 96374 THER/PROPH/DIAG INJ IV PUSH: CPT

## 2024-09-20 PROCEDURE — 84439 ASSAY OF FREE THYROXINE: CPT

## 2024-09-20 PROCEDURE — 2580000003 HC RX 258: Performed by: INTERNAL MEDICINE

## 2024-09-20 PROCEDURE — 99285 EMERGENCY DEPT VISIT HI MDM: CPT

## 2024-09-20 PROCEDURE — 84100 ASSAY OF PHOSPHORUS: CPT

## 2024-09-20 PROCEDURE — 83935 ASSAY OF URINE OSMOLALITY: CPT

## 2024-09-20 RX ORDER — POTASSIUM CHLORIDE 7.45 MG/ML
10 INJECTION INTRAVENOUS ONCE
Status: COMPLETED | OUTPATIENT
Start: 2024-09-20 | End: 2024-09-20

## 2024-09-20 RX ORDER — ONDANSETRON 2 MG/ML
4 INJECTION INTRAMUSCULAR; INTRAVENOUS EVERY 6 HOURS PRN
Status: DISCONTINUED | OUTPATIENT
Start: 2024-09-20 | End: 2024-09-24 | Stop reason: HOSPADM

## 2024-09-20 RX ORDER — MAGNESIUM SULFATE IN WATER 40 MG/ML
2000 INJECTION, SOLUTION INTRAVENOUS PRN
Status: DISCONTINUED | OUTPATIENT
Start: 2024-09-20 | End: 2024-09-24 | Stop reason: HOSPADM

## 2024-09-20 RX ORDER — POTASSIUM CHLORIDE 7.45 MG/ML
10 INJECTION INTRAVENOUS PRN
Status: DISCONTINUED | OUTPATIENT
Start: 2024-09-20 | End: 2024-09-24 | Stop reason: HOSPADM

## 2024-09-20 RX ORDER — ATORVASTATIN CALCIUM 40 MG/1
40 TABLET, FILM COATED ORAL NIGHTLY
Status: DISCONTINUED | OUTPATIENT
Start: 2024-09-20 | End: 2024-09-24 | Stop reason: HOSPADM

## 2024-09-20 RX ORDER — SENNA AND DOCUSATE SODIUM 50; 8.6 MG/1; MG/1
1 TABLET, FILM COATED ORAL 2 TIMES DAILY
Status: DISCONTINUED | OUTPATIENT
Start: 2024-09-20 | End: 2024-09-24 | Stop reason: HOSPADM

## 2024-09-20 RX ORDER — ASPIRIN 81 MG/1
81 TABLET ORAL DAILY
Status: DISCONTINUED | OUTPATIENT
Start: 2024-09-21 | End: 2024-09-24 | Stop reason: HOSPADM

## 2024-09-20 RX ORDER — 0.9 % SODIUM CHLORIDE 0.9 %
1000 INTRAVENOUS SOLUTION INTRAVENOUS ONCE
Status: COMPLETED | OUTPATIENT
Start: 2024-09-20 | End: 2024-09-20

## 2024-09-20 RX ORDER — ACETAMINOPHEN 325 MG/1
650 TABLET ORAL EVERY 6 HOURS PRN
Status: DISCONTINUED | OUTPATIENT
Start: 2024-09-20 | End: 2024-09-24 | Stop reason: HOSPADM

## 2024-09-20 RX ORDER — POTASSIUM CHLORIDE 1500 MG/1
40 TABLET, EXTENDED RELEASE ORAL PRN
Status: DISCONTINUED | OUTPATIENT
Start: 2024-09-20 | End: 2024-09-24 | Stop reason: HOSPADM

## 2024-09-20 RX ORDER — POTASSIUM CHLORIDE 1500 MG/1
40 TABLET, EXTENDED RELEASE ORAL ONCE
Status: COMPLETED | OUTPATIENT
Start: 2024-09-20 | End: 2024-09-20

## 2024-09-20 RX ORDER — SODIUM CHLORIDE 0.9 % (FLUSH) 0.9 %
5-40 SYRINGE (ML) INJECTION EVERY 12 HOURS SCHEDULED
Status: DISCONTINUED | OUTPATIENT
Start: 2024-09-20 | End: 2024-09-24 | Stop reason: HOSPADM

## 2024-09-20 RX ORDER — LEVOTHYROXINE SODIUM 112 UG/1
112 TABLET ORAL
Status: DISCONTINUED | OUTPATIENT
Start: 2024-09-21 | End: 2024-09-24 | Stop reason: HOSPADM

## 2024-09-20 RX ORDER — ENOXAPARIN SODIUM 100 MG/ML
40 INJECTION SUBCUTANEOUS DAILY
Status: DISCONTINUED | OUTPATIENT
Start: 2024-09-21 | End: 2024-09-24 | Stop reason: HOSPADM

## 2024-09-20 RX ORDER — POLYETHYLENE GLYCOL 3350 17 G/17G
17 POWDER, FOR SOLUTION ORAL DAILY PRN
Status: DISCONTINUED | OUTPATIENT
Start: 2024-09-20 | End: 2024-09-24 | Stop reason: HOSPADM

## 2024-09-20 RX ORDER — ONDANSETRON 4 MG/1
4 TABLET, ORALLY DISINTEGRATING ORAL EVERY 8 HOURS PRN
Status: DISCONTINUED | OUTPATIENT
Start: 2024-09-20 | End: 2024-09-24 | Stop reason: HOSPADM

## 2024-09-20 RX ORDER — ACETAMINOPHEN 650 MG/1
650 SUPPOSITORY RECTAL EVERY 6 HOURS PRN
Status: DISCONTINUED | OUTPATIENT
Start: 2024-09-20 | End: 2024-09-24 | Stop reason: HOSPADM

## 2024-09-20 RX ORDER — CETIRIZINE HYDROCHLORIDE 10 MG/1
10 TABLET ORAL DAILY PRN
Status: DISCONTINUED | OUTPATIENT
Start: 2024-09-20 | End: 2024-09-24 | Stop reason: HOSPADM

## 2024-09-20 RX ORDER — SODIUM CHLORIDE 9 MG/ML
INJECTION, SOLUTION INTRAVENOUS CONTINUOUS
Status: ACTIVE | OUTPATIENT
Start: 2024-09-20 | End: 2024-09-22

## 2024-09-20 RX ORDER — SODIUM CHLORIDE 0.9 % (FLUSH) 0.9 %
5-40 SYRINGE (ML) INJECTION PRN
Status: DISCONTINUED | OUTPATIENT
Start: 2024-09-20 | End: 2024-09-24 | Stop reason: HOSPADM

## 2024-09-20 RX ORDER — METOPROLOL TARTRATE 25 MG/1
25 TABLET, FILM COATED ORAL 2 TIMES DAILY
Status: DISCONTINUED | OUTPATIENT
Start: 2024-09-20 | End: 2024-09-20

## 2024-09-20 RX ORDER — SODIUM CHLORIDE 9 MG/ML
INJECTION, SOLUTION INTRAVENOUS PRN
Status: DISCONTINUED | OUTPATIENT
Start: 2024-09-20 | End: 2024-09-24 | Stop reason: HOSPADM

## 2024-09-20 RX ADMIN — POTASSIUM CHLORIDE 40 MEQ: 20 TABLET, EXTENDED RELEASE ORAL at 19:42

## 2024-09-20 RX ADMIN — SODIUM CHLORIDE: 9 INJECTION, SOLUTION INTRAVENOUS at 22:46

## 2024-09-20 RX ADMIN — SODIUM CHLORIDE 1000 ML: 9 INJECTION, SOLUTION INTRAVENOUS at 18:55

## 2024-09-20 RX ADMIN — POTASSIUM CHLORIDE 10 MEQ: 7.46 INJECTION, SOLUTION INTRAVENOUS at 19:43

## 2024-09-20 ASSESSMENT — PAIN - FUNCTIONAL ASSESSMENT: PAIN_FUNCTIONAL_ASSESSMENT: NONE - DENIES PAIN

## 2024-09-21 LAB
ALBUMIN SERPL-MCNC: 3.3 G/DL (ref 3.4–5)
ANION GAP SERPL CALCULATED.3IONS-SCNC: 8 MMOL/L (ref 3–16)
BUN SERPL-MCNC: 18 MG/DL (ref 7–20)
CA-I BLD-SCNC: 1.5 MMOL/L (ref 1.12–1.32)
CALCIUM SERPL-MCNC: 11.5 MG/DL (ref 8.3–10.6)
CHLORIDE SERPL-SCNC: 107 MMOL/L (ref 99–110)
CO2 SERPL-SCNC: 23 MMOL/L (ref 21–32)
CREAT SERPL-MCNC: 1.1 MG/DL (ref 0.6–1.2)
GFR SERPLBLD CREATININE-BSD FMLA CKD-EPI: 56 ML/MIN/{1.73_M2}
GLUCOSE SERPL-MCNC: 86 MG/DL (ref 70–99)
MAGNESIUM SERPL-MCNC: 1.34 MG/DL (ref 1.8–2.4)
OSMOLALITY UR: 237 MOSM/KG (ref 390–1070)
PH BLDV: 7.42 [PH] (ref 7.35–7.45)
PHOSPHATE SERPL-MCNC: 1.9 MG/DL (ref 2.5–4.9)
POTASSIUM SERPL-SCNC: 3.5 MMOL/L (ref 3.5–5.1)
PTH-INTACT SERPL-MCNC: 16.3 PG/ML (ref 14–72)
SODIUM SERPL-SCNC: 138 MMOL/L (ref 136–145)

## 2024-09-21 PROCEDURE — 6370000000 HC RX 637 (ALT 250 FOR IP): Performed by: INTERNAL MEDICINE

## 2024-09-21 PROCEDURE — 2580000003 HC RX 258: Performed by: INTERNAL MEDICINE

## 2024-09-21 PROCEDURE — 82164 ANGIOTENSIN I ENZYME TEST: CPT

## 2024-09-21 PROCEDURE — 82330 ASSAY OF CALCIUM: CPT

## 2024-09-21 PROCEDURE — 94760 N-INVAS EAR/PLS OXIMETRY 1: CPT

## 2024-09-21 PROCEDURE — 82542 COL CHROMOTOGRAPHY QUAL/QUAN: CPT

## 2024-09-21 PROCEDURE — 83735 ASSAY OF MAGNESIUM: CPT

## 2024-09-21 PROCEDURE — 36415 COLL VENOUS BLD VENIPUNCTURE: CPT

## 2024-09-21 PROCEDURE — 87086 URINE CULTURE/COLONY COUNT: CPT

## 2024-09-21 PROCEDURE — 82652 VIT D 1 25-DIHYDROXY: CPT

## 2024-09-21 PROCEDURE — 6360000002 HC RX W HCPCS: Performed by: INTERNAL MEDICINE

## 2024-09-21 PROCEDURE — 83970 ASSAY OF PARATHORMONE: CPT

## 2024-09-21 PROCEDURE — 1200000000 HC SEMI PRIVATE

## 2024-09-21 PROCEDURE — 80069 RENAL FUNCTION PANEL: CPT

## 2024-09-21 RX ORDER — LOSARTAN POTASSIUM AND HYDROCHLOROTHIAZIDE 12.5; 5 MG/1; MG/1
1 TABLET ORAL DAILY
Status: DISCONTINUED | OUTPATIENT
Start: 2024-09-21 | End: 2024-09-21

## 2024-09-21 RX ORDER — HYDROCHLOROTHIAZIDE 25 MG/1
12.5 TABLET ORAL DAILY
Status: DISCONTINUED | OUTPATIENT
Start: 2024-09-21 | End: 2024-09-21

## 2024-09-21 RX ORDER — MAGNESIUM SULFATE IN WATER 40 MG/ML
4000 INJECTION, SOLUTION INTRAVENOUS ONCE
Status: DISCONTINUED | OUTPATIENT
Start: 2024-09-21 | End: 2024-09-22

## 2024-09-21 RX ORDER — LOSARTAN POTASSIUM 50 MG/1
50 TABLET ORAL DAILY
Status: DISCONTINUED | OUTPATIENT
Start: 2024-09-21 | End: 2024-09-24 | Stop reason: HOSPADM

## 2024-09-21 RX ORDER — MELOXICAM 7.5 MG/1
15 TABLET ORAL DAILY
Status: DISCONTINUED | OUTPATIENT
Start: 2024-09-21 | End: 2024-09-21

## 2024-09-21 RX ADMIN — SODIUM CHLORIDE, PRESERVATIVE FREE 10 ML: 5 INJECTION INTRAVENOUS at 20:38

## 2024-09-21 RX ADMIN — MELOXICAM 15 MG: 7.5 TABLET ORAL at 08:39

## 2024-09-21 RX ADMIN — POTASSIUM & SODIUM PHOSPHATES POWDER PACK 280-160-250 MG 250 MG: 280-160-250 PACK at 16:13

## 2024-09-21 RX ADMIN — ENOXAPARIN SODIUM 40 MG: 100 INJECTION SUBCUTANEOUS at 08:19

## 2024-09-21 RX ADMIN — MAGNESIUM SULFATE HEPTAHYDRATE 2000 MG: 40 INJECTION, SOLUTION INTRAVENOUS at 08:42

## 2024-09-21 RX ADMIN — MAGNESIUM SULFATE HEPTAHYDRATE 2000 MG: 40 INJECTION, SOLUTION INTRAVENOUS at 10:53

## 2024-09-21 RX ADMIN — SENNOSIDES AND DOCUSATE SODIUM 1 TABLET: 50; 8.6 TABLET ORAL at 08:19

## 2024-09-21 RX ADMIN — ATORVASTATIN CALCIUM 40 MG: 40 TABLET, FILM COATED ORAL at 20:33

## 2024-09-21 RX ADMIN — LEVOTHYROXINE SODIUM 112 MCG: 0.11 TABLET ORAL at 05:52

## 2024-09-21 RX ADMIN — ATORVASTATIN CALCIUM 40 MG: 40 TABLET, FILM COATED ORAL at 00:33

## 2024-09-21 RX ADMIN — SODIUM CHLORIDE: 9 INJECTION, SOLUTION INTRAVENOUS at 06:14

## 2024-09-21 RX ADMIN — ZOLEDRONIC ACID 4 MG: 4 INJECTION, SOLUTION, CONCENTRATE INTRAVENOUS at 13:39

## 2024-09-21 RX ADMIN — SODIUM CHLORIDE: 9 INJECTION, SOLUTION INTRAVENOUS at 18:16

## 2024-09-21 RX ADMIN — SODIUM CHLORIDE, PRESERVATIVE FREE 10 ML: 5 INJECTION INTRAVENOUS at 08:20

## 2024-09-21 RX ADMIN — SENNOSIDES AND DOCUSATE SODIUM 1 TABLET: 50; 8.6 TABLET ORAL at 20:33

## 2024-09-21 RX ADMIN — ASPIRIN 81 MG: 81 TABLET, COATED ORAL at 08:20

## 2024-09-21 ASSESSMENT — PAIN SCALES - GENERAL
PAINLEVEL_OUTOF10: 2
PAINLEVEL_OUTOF10: 0

## 2024-09-21 ASSESSMENT — PAIN DESCRIPTION - FREQUENCY: FREQUENCY: INTERMITTENT

## 2024-09-21 ASSESSMENT — PAIN DESCRIPTION - PAIN TYPE: TYPE: CHRONIC PAIN

## 2024-09-21 ASSESSMENT — PAIN DESCRIPTION - LOCATION: LOCATION: BACK

## 2024-09-21 ASSESSMENT — PAIN DESCRIPTION - ORIENTATION: ORIENTATION: LOWER;POSTERIOR

## 2024-09-21 ASSESSMENT — PAIN DESCRIPTION - DESCRIPTORS: DESCRIPTORS: ACHING

## 2024-09-21 ASSESSMENT — PAIN DESCRIPTION - ONSET: ONSET: ON-GOING

## 2024-09-21 ASSESSMENT — PAIN - FUNCTIONAL ASSESSMENT: PAIN_FUNCTIONAL_ASSESSMENT: ACTIVITIES ARE NOT PREVENTED

## 2024-09-21 NOTE — PROGRESS NOTES
Medication Reconciliation    List of medications patient is currently taking is complete.     Source of information: 1. Conversation with patient at bedside                                      2. EPIC records         Jan Moreno [facility]   9/21/2024  9:15 AM

## 2024-09-21 NOTE — PROGRESS NOTES
4 Eyes Skin Assessment     NAME:  Sarah Thapa  YOB: 1960  MEDICAL RECORD NUMBER:  [id number]    The patient is being assessed for  Admission    I agree that at least one RN has performed a thorough Head to Toe Skin Assessment on the patient. ALL assessment sites listed below have been assessed.      Areas assessed by both nurses:    Head, Face, Ears, Shoulders, Back, Chest, Arms, Elbows, Hands, Sacrum. Buttock, Coccyx, Ischium, Legs. Feet and Heels, and Under Medical Devices         Does the Patient have a Wound? No noted wound(s)       Roshan Prevention initiated by RN: No  Wound Care Orders initiated by RN: No    Pressure Injury (Stage 3,4, Unstageable, DTI, NWPT, and Complex wounds) if present, place Wound referral order by RN under : No    New Ostomies, if present place, Ostomy referral order under : No     Nurse 1 eSignature: Electronically signed by Jessica Roberson RN on 9/21/24 at 3:39 AM EDT    **SHARE this note so that the co-signing nurse can place an eSignature**    Nurse 2 eSignature: Electronically signed by Amber Lyles RN on 9/21/24 at 3:39 AM EDT

## 2024-09-21 NOTE — PROGRESS NOTES
Patient in bed, A&O X4. VSS. PIV flushed, dressing CDI, infusing per orders at this time. Magnesium replacement infusing IVPB per protocol for a Mag of 1.34. Patient reports pain of a 2/10 in her lower back. Patient states pain is chronic in nature. Scheduled Mobic administered. All other AM medications taken without complaint (see eMAR).  Patient tolerating PO intake and appetite adequate. No other needs verbalized at this time. Bed in low and locked position and call light within reach.

## 2024-09-21 NOTE — PROGRESS NOTES
Hospitalist Progress Note  9/21/2024 8:34 AM  Subjective:   Admit Date: 9/20/2024  PCP: Marvel Arceo, DO Status: Inpatient   Interval History: Hospital Day: 2, admitted with hypercalcemia.  Recent diagnosis of splenomegaly with abdominal lymphadenopathy, biopsy negative for lymphoma.  Follows with Dr. Best for Hem/Onc.  Psoriatic arthritis on apremilast (Otezla) 30 mg BID,  brought Hyzaar from home.  Continuing losartan.  Thiazide discontinued by nephrology.     Diet: regular  Right antecubital peripheral IV (9/20, day #2)    Medications:   Sodium chloride at 125 ml/hr  meloxicam  15 mg Oral Daily   enoxaparin  40 mg SubCUTAneous Daily   sennosides-docusate   1 tablet Oral BID   aspirin  81 mg Oral Daily   atorvastatin  40 mg Oral Nightly   levothyroxine  112 mcg Oral QAM AC   apremilast (Otezla)  30 mg Oral BID       Labs:     Recent Labs     09/20/24  1454 09/20/24  1456 09/20/24  1801 09/21/24  0712   WBC  --  9.8 11.6*  --    HGB  --  11.4* 11.6*  --    PLT  --  302 306  --    MCV  --  88.1 89.0  --             --  137 138   K 3.2*  --  3.1* 3.5     --  101 107   CO2 28  --  25 23   BUN 20  --  21* 18   CREATININE 1.51*  --  1.3* 1.1   GLUCOSE 88  --  88 86          MG  --   --  1.44* 1.34*   PHOS  --   --  2.3* 1.9*   CALCIUM 13.1*  --  13.3* 11.5*   ALBUMIN 4.1  --  4.1 3.3*   AST 22  --  33  --    ALT 28  --  31  --    ALKPHOS 60  --  78  --      intact PTH (9/21) 16.3 pg/mL  Ionized Calcium (9/21) 1.50 mmol/L (1.12 - 1.32)    CRP (9/20) < 5.0 mg/L  HIV screen (9/21/24) non-=reactive    CT chest / abd / pelvis (9/14)  Few new solid nodules inferiorly in the right middle lobe measuring up to 3 mm.  Otherwise, numerous randomly distributed solid pulmonary nodules, largest measuring 5 mm in the left lower lobe, are unchanged compared to CT chest abdomen pelvis 06/29/2024. Similar splenomegaly with innumerable small hypodense lesions scattered throughout the spleen.  to goal Mg > 2.0 mg/dL.   Hypothyroid (TSH 5.94 / FT4 1.8) on levothyroxine 112 mcg daily.   Primary hypertension on Hyzaar, held on admission for JAMEY and hypercalcemia.  May restart.   Dyslipidemia on atorvastatin 40 mg daily.     Advance Directive: Full Code  DVT prophylaxis with enoxaparin 40 mg sub-Q daily.   Discharge plannin-2 days inpatient status      KELLI ARREOLA MD  Rounding Hospitalist

## 2024-09-21 NOTE — ED PROVIDER NOTES
[facility]  EMERGENCY DEPARTMENT ENCOUNTER        Pt Name: Sarah Thapa  MRN: [id number]  Birthdate 1960  Date of evaluation: 9/20/2024  Provider: RAJEEV Craven  PCP: Marvel Arceo DO  Note Started: 1:19 AM EDT 9/21/24      DILLAN. I have evaluated this patient.        CHIEF COMPLAINT       Chief Complaint   Patient presents with    Abnormal Lab     Pt to ED with CC of hypercalcemia.  Denies sx.       HISTORY OF PRESENT ILLNESS: 1 or more Elements     History from : Patient    Limitations to history : None    Sarah Thapa is a 63 y.o. female who presents after being instructed to come by her outpatient doctor for hypercalcemia. On 8/15 she was started on losartan-HCTZ. On 8/26 they stopped her metoprolol. Over the past couple of months she has been seeing rheumatology, hematology in her primary care doctor. She tells me she had a lymph node biopsy recently and that this was negative for lymphoma. She tells me she is being treated for psoriatic arthritis so she has pain in her back and joints at baseline but otherwise denies any new pain, vomiting or diarrhea. No known fevers. Does have thyroid disease.    Nursing Notes were all reviewed and agreed with or any disagreements were addressed in the HPI.    REVIEW OF SYSTEMS :      Review of Systems   Constitutional:  Negative for fever.   Respiratory:  Negative for shortness of breath.    Cardiovascular:  Negative for chest pain.   Gastrointestinal:  Negative for abdominal pain, diarrhea and vomiting.   Musculoskeletal:  Positive for arthralgias and back pain. Negative for neck stiffness.   Skin:  Negative for color change and wound.   Neurological:  Negative for weakness and numbness.   Psychiatric/Behavioral:  Negative for agitation, behavioral problems and confusion.        Positives and Pertinent negatives as per HPI.     SURGICAL HISTORY     Past Surgical History:   Procedure Laterality Date    ACHILLES TENDON SURGERY  inflammation), and Psoriatic arthritis (HCC).     EMERGENCY DEPARTMENT COURSE and DIFFERENTIAL DIAGNOSIS/MDM:   Vitals:    Vitals:    09/20/24 1730 09/20/24 2130 09/20/24 2145 09/20/24 2226   BP: (!) 167/89 (!) 152/89 (!) 146/75 (!) 163/67   Pulse: 90 83 76 87   Resp: 16 21 16 17   Temp: 98.6 °F (37 °C)   98.4 °F (36.9 °C)   TempSrc: Oral   Oral   SpO2: 95% 97% 99% 96%   Weight: 90.9 kg (200 lb 6.4 oz)      Height: 1.6 m (5' 3\")          Patient was given the following medications:  Medications   sodium chloride flush 0.9 % injection 5-40 mL ( IntraVENous Canceled Entry 9/21/24 0010)   sodium chloride flush 0.9 % injection 5-40 mL (has no administration in time range)   0.9 % sodium chloride infusion (has no administration in time range)   enoxaparin (LOVENOX) injection 40 mg (has no administration in time range)   ondansetron (ZOFRAN-ODT) disintegrating tablet 4 mg (has no administration in time range)     Or   ondansetron (ZOFRAN) injection 4 mg (has no administration in time range)   acetaminophen (TYLENOL) tablet 650 mg (has no administration in time range)     Or   acetaminophen (TYLENOL) suppository 650 mg (has no administration in time range)   0.9 % sodium chloride infusion ( IntraVENous New Bag 9/20/24 2246)   sennosides-docusate sodium (SENOKOT-S) 8.6-50 MG tablet 1 tablet (1 tablet Oral Not Given 9/21/24 0009)   polyethylene glycol (GLYCOLAX) packet 17 g (has no administration in time range)   sodium phosphate (FLEET) rectal enema 1 enema (has no administration in time range)   aspirin EC tablet 81 mg (has no administration in time range)   atorvastatin (LIPITOR) tablet 40 mg (40 mg Oral Given 9/21/24 0033)   cetirizine (ZYRTEC) tablet 10 mg (has no administration in time range)   levothyroxine (SYNTHROID) tablet 112 mcg (has no administration in time range)   Apremilast TABS 30 mg (Patient Supplied) (30 mg Oral Not Given 9/21/24 0009)   potassium chloride (KLOR-CON M) extended release tablet 40 mEq (has

## 2024-09-21 NOTE — CONSULTS
[facility]  Phone: [phone]  Fax: [phone]  [place]         Reason for Consult: Hypercalcemia  Requesting Physician:  Dr. ARREOLA      History Obtained From:  patient, electronic medical record    History of Present Ilness: 63-year-old white female with history of hypertension, Psoriatec arthritis who was hospitalized in July and discovered to have generalized lymphadenopathy and splenomegaly a EUS guided periportal lymph node biopsy was done that was negative for lymphoma  She has been followed by Dr. Best and Dr. Fairchild  Her calcium was 10.6 mg in July  She had outpatient labs done by her rheumatologist which revealed elevated calcium of 13.1 and she was asked to come to the ER Also noted to have creatinine of 1.51 mg potassium of 3.2  She had recently been started on losartan HCTZ on August 15  She does not take any calcium supplementation  She has low-grade fever off and on, denies night sweats, weight loss  She has had some constipation denies difficulty concentrating    Past Medical History:        Diagnosis Date    Arthralgia of hand, right     Chronic seasonal allergic rhinitis due to pollen     Edema of both legs     Essential hypertension     Hyperlipidemia, mixed     past hx BORDERLINE    Hypothyroidism (acquired)     Non morbid obesity due to excess calories     Psoriasis (a type of skin inflammation)     Psoriatic arthritis (HCC)        Past Surgical History:        Procedure Laterality Date    ACHILLES TENDON SURGERY Left 2/2/2021    EXCISION POSTERIOR CALCANEAL EXOSTOSIS LEFT, DEBRIDEMENT AND REPAIR LEFT ACHILLES performed by Austin Duarte DPM at [facility] OR    BRONCHOSCOPY  06/05/2017    CHOLECYSTECTOMY, LAPAROSCOPIC  10/12/2016    with milena / Dr. Archibald.     FOOT SURGERY Left     Left cyst removal    HEEL SPUR SURGERY Left 02/02/2021    Dr. Duarte    HYSTERECTOMY (CERVIX STATUS UNKNOWN)  1987    Partial    SHOULDER SURGERY      Right x 3 / Rotator Cuff  positives stated above in HPI. All other systems were reviewed and were negative.    Physical exam:   Constitutional:  VITALS:  /72   Pulse 77   Temp 98.3 °F (36.8 °C) (Oral)   Resp 16   Ht 1.6 m (5' 3\")   Wt 90.9 kg (200 lb 6.4 oz)   SpO2 96%   BMI 35.50 kg/m²   Gen: alert, awake, nad  Skin: no rash, turgor wnl  Heent:  eomi, mmm  Neck: no bruits or jvd noted, thyroid normal  Cardiovascular:  S1, S2 without m/r/g  Respiratory: CTA B without w/r/r; respiratory effort normal  Abdomen:  +bs, soft, nt, nd, no hepatosplenomegaly  Ext: trace lower extremity edema  Psychiatric: mood and affect appropriate; judgement and insight intact  Musculoskeletal:  Rom, muscular strength intact; digits, nails normal    Data/  CBC:   Lab Results   Component Value Date/Time    WBC 11.6 09/20/2024 06:01 PM    RBC 3.83 09/20/2024 06:01 PM    HGB 11.6 09/20/2024 06:01 PM    HCT 34.1 09/20/2024 06:01 PM    MCV 89.0 09/20/2024 06:01 PM    MCH 30.3 09/20/2024 06:01 PM    MCHC 34.1 09/20/2024 06:01 PM    RDW 13.1 09/20/2024 06:01 PM     09/20/2024 06:01 PM    MPV 8.3 09/20/2024 06:01 PM     BMP:    Lab Results   Component Value Date/Time     09/21/2024 07:12 AM    K 3.5 09/21/2024 07:12 AM    K 3.1 09/20/2024 06:01 PM     09/21/2024 07:12 AM    CO2 23 09/21/2024 07:12 AM    BUN 18 09/21/2024 07:12 AM    CREATININE 1.1 09/21/2024 07:12 AM    CALCIUM 11.5 09/21/2024 07:12 AM    GFRAA >60 02/01/2022 07:32 AM    GFRAA >60 05/20/2013 04:25 PM    LABGLOM 56 09/21/2024 07:12 AM    LABGLOM 63 04/18/2024 08:40 AM    GLUCOSE 86 09/21/2024 07:12 AM         Assessment/  1-hypercalcemia in the setting of diffuse adenopathy concerning for malignancy  Her PTH is appropriately low at 16.3 ruling out primary hyperparathyroidism patient did start hydrochlorothiazide about a month ago and could be contributing she has also been on meloxicam on a regular basis  2-lymphadenopathy, splenic lesions status post lymph node biopsy in

## 2024-09-21 NOTE — PLAN OF CARE
Problem: Discharge Planning  Goal: Discharge to home or other facility with appropriate resources  Outcome: Progressing  Flowsheets (Taken 9/21/2024 0106)  Discharge to home or other facility with appropriate resources:   Identify barriers to discharge with patient and caregiver   Identify discharge learning needs (meds, wound care, etc)   Refer to discharge planning if patient needs post-hospital services based on physician order or complex needs related to functional status, cognitive ability or social support system   Arrange for needed discharge resources and transportation as appropriate     Problem: Safety - Adult  Goal: Free from fall injury  Outcome: Progressing  Flowsheets (Taken 9/21/2024 0106)  Free From Fall Injury: Instruct family/caregiver on patient safety

## 2024-09-21 NOTE — CONSULTS
[facility]    Consult Note      Requesting Physician:  Rah Cramer MD    CHIEF COMPLAINT:  hypercalcemia      HISTORY OF PRESENT ILLNESS:    Ms. Thapa  is a 63 y.o. female we are seeing in consultation for hypercalcemia.  This is a patient that I follow for lymphadenopathy who presented to the hospital after outpatient blood work revealed severe hypercalcemia with a calcium in the 13 range.  In terms of her history, she was admitted in July for multiple nonspecific symptoms.  CT scans at that time showed epicardial and angel hepatis and retroperitoneal lymphadenopathy as well as splenic lesions.  An EUS guided periportal lymph node biopsy was done that was negative for lymphoma or cancer.  It was decided to follow these and a follow-up CT in on 9/13/2024 showed that her lymphadenopathy and splenic lesions and lung nodules were stable.  Since that initial admission, she has also been seen by infectious disease and more recently rheumatology.  She is feeling better with some fluids.  She denies any recent fevers or chills or sweats.    Past Medical History:  Past Medical History:   Diagnosis Date    Arthralgia of hand, right     Chronic seasonal allergic rhinitis due to pollen     Edema of both legs     Essential hypertension     Hyperlipidemia, mixed     past hx BORDERLINE    Hypothyroidism (acquired)     Non morbid obesity due to excess calories     Psoriasis (a type of skin inflammation)     Psoriatic arthritis (HCC)        Past Surgical History:  Past Surgical History:   Procedure Laterality Date    ACHILLES TENDON SURGERY Left 2/2/2021    EXCISION POSTERIOR CALCANEAL EXOSTOSIS LEFT, DEBRIDEMENT AND REPAIR LEFT ACHILLES performed by Austin Duarte DPM at [facility] OR    BRONCHOSCOPY  06/05/2017    CHOLECYSTECTOMY, LAPAROSCOPIC  10/12/2016    with milena / Dr. Archibald.     FOOT

## 2024-09-21 NOTE — PLAN OF CARE
Problem: Discharge Planning  Goal: Discharge to home or other facility with appropriate resources  9/21/2024 1435 by Jackie Romero RN  Outcome: Progressing  Flowsheets (Taken 9/21/2024 1435)  Discharge to home or other facility with appropriate resources:   Identify barriers to discharge with patient and caregiver   Identify discharge learning needs (meds, wound care, etc)   Refer to discharge planning if patient needs post-hospital services based on physician order or complex needs related to functional status, cognitive ability or social support system   Arrange for needed discharge resources and transportation as appropriate  9/21/2024 0106 by Jessica Roberson RN  Outcome: Progressing  Flowsheets (Taken 9/21/2024 0106)  Discharge to home or other facility with appropriate resources:   Identify barriers to discharge with patient and caregiver   Identify discharge learning needs (meds, wound care, etc)   Refer to discharge planning if patient needs post-hospital services based on physician order or complex needs related to functional status, cognitive ability or social support system   Arrange for needed discharge resources and transportation as appropriate     Problem: Safety - Adult  Goal: Free from fall injury  9/21/2024 1435 by Jackie Romero RN  Outcome: Progressing  Flowsheets (Taken 9/21/2024 1435)  Free From Fall Injury: Instruct family/caregiver on patient safety  9/21/2024 0106 by Jessica Roberson RN  Outcome: Progressing  Flowsheets (Taken 9/21/2024 0106)  Free From Fall Injury: Instruct family/caregiver on patient safety     Problem: Pain  Goal: Verbalizes/displays adequate comfort level or baseline comfort level  Outcome: Progressing  Flowsheets (Taken 9/21/2024 1435)  Verbalizes/displays adequate comfort level or baseline comfort level:   Encourage patient to monitor pain and request assistance   Administer analgesics based on type and severity of pain and evaluate response   Consider cultural

## 2024-09-21 NOTE — PROGRESS NOTES
Patient arrived to room 3121 from ED. Patient is A/Ox4. Oriented patient to room and call light. Fall assessment completed, patient denies any fall history. Instructed patient to call for help out of bed, patient verbalized understanding. Patient denies any further needs at this time, will continue to monitor and assess for needs and comfort.

## 2024-09-21 NOTE — H&P
V2.0  History and Physical      Name:  Sarah Thapa /Age/Sex: 1960  (63 y.o. female)   MRN & CSN:  [id number] & [id number] Encounter Date/Time: 2024 8:00 PM EDT   [facility] PCP: Marvel Arceo DO       Hospital Day: 1    Assessment and Plan:   Sarah Thapa is a 63 y.o. obese female non-smoker with a pmh of psoriasis, psoriatic arthritis, hypertension, recent diagnosis of splenomegaly with abdominal lymphadenopathy the biopsy of which turned back negative for lymphoma who presents with Hypercalcemia.    Hospital Problems             Last Modified POA    * (Principal) Hypercalcemia 2024 Yes    JAMEY (acute kidney injury) (HCC) 2024 Yes    Primary hypertension 2024 Yes    Electrolyte abnormality 2024 Yes        Plan:  Aggressive hydration, follow-up ionized calcium, check PTH and PTH HRP, vitamin D level  Trend creatinine, electrolytes, strict I's and O's, avoid nephrotoxic agents  Replace electrolytes and follow-up    Disposition:   Current Living situation: Home  Expected Disposition: Home  Estimated D/C: 3 days    Diet ADULT DIET; Regular; No Added Salt (3-4 gm)   DVT Prophylaxis [x] Lovenox, []  Heparin, [] SCDs, [] Ambulation,  [] Eliquis, [] Xarelto, [] Coumadin   Code Status Full Code   Surrogate Decision Maker/ POA      Personally reviewed Lab Studies and Imaging     Discussed management of the case with no one who recommended n/a.    EKG interpreted personally and results none done.    Imaging that was interpreted personally includes none and results n/a.    Drugs that require monitoring for toxicity include none and the method of monitoring was n/a.        History from:     patient    History of Present Illness:     Chief Complaint: Abnormal labs  Sarah Thapa is a 63 y.o. female with pmh of hypertension, hypothyroidism, splenomegaly and lymphadenopathy (negative for lymphoma on recent biopsy) followed by heme-onc who presents with

## 2024-09-22 LAB
ALBUMIN SERPL-MCNC: 3.3 G/DL (ref 3.4–5)
ANION GAP SERPL CALCULATED.3IONS-SCNC: 8 MMOL/L (ref 3–16)
BACTERIA UR CULT: NORMAL
BASOPHILS # BLD: 0.1 K/UL (ref 0–0.2)
BASOPHILS NFR BLD: 1.1 %
BUN SERPL-MCNC: 14 MG/DL (ref 7–20)
CALCIUM SERPL-MCNC: 10.9 MG/DL (ref 8.3–10.6)
CHLORIDE SERPL-SCNC: 107 MMOL/L (ref 99–110)
CO2 SERPL-SCNC: 23 MMOL/L (ref 21–32)
CREAT SERPL-MCNC: 1 MG/DL (ref 0.6–1.2)
DEPRECATED RDW RBC AUTO: 13.4 % (ref 12.4–15.4)
EOSINOPHIL # BLD: 0.3 K/UL (ref 0–0.6)
EOSINOPHIL NFR BLD: 3.9 %
GFR SERPLBLD CREATININE-BSD FMLA CKD-EPI: 63 ML/MIN/{1.73_M2}
GLUCOSE SERPL-MCNC: 89 MG/DL (ref 70–99)
HCT VFR BLD AUTO: 28.9 % (ref 36–48)
HGB BLD-MCNC: 10 G/DL (ref 12–16)
KAPPA LC FREE SER-MCNC: 51.4 MG/L (ref 2.37–20.73)
KAPPA LC FREE/LAMBDA FREE SER: 1.41 {RATIO} (ref 0.22–1.74)
LAMBDA LC FREE SERPL-MCNC: 36.5 MG/L (ref 4.23–27.69)
LYMPHOCYTES # BLD: 1.9 K/UL (ref 1–5.1)
LYMPHOCYTES NFR BLD: 22.7 %
MAGNESIUM SERPL-MCNC: 1.87 MG/DL (ref 1.8–2.4)
MCH RBC QN AUTO: 30.7 PG (ref 26–34)
MCHC RBC AUTO-ENTMCNC: 34.8 G/DL (ref 31–36)
MCV RBC AUTO: 88.2 FL (ref 80–100)
MONOCYTES # BLD: 1.4 K/UL (ref 0–1.3)
MONOCYTES NFR BLD: 15.9 %
NEUTROPHILS # BLD: 4.8 K/UL (ref 1.7–7.7)
NEUTROPHILS NFR BLD: 56.4 %
PHOSPHATE SERPL-MCNC: 2.4 MG/DL (ref 2.5–4.9)
PLATELET # BLD AUTO: 256 K/UL (ref 135–450)
PMV BLD AUTO: 7.7 FL (ref 5–10.5)
POTASSIUM SERPL-SCNC: 3.5 MMOL/L (ref 3.5–5.1)
RBC # BLD AUTO: 3.27 M/UL (ref 4–5.2)
RPT COMMENT: ABNORMAL
SODIUM SERPL-SCNC: 138 MMOL/L (ref 136–145)
WBC # BLD AUTO: 8.5 K/UL (ref 4–11)

## 2024-09-22 PROCEDURE — 1200000000 HC SEMI PRIVATE

## 2024-09-22 PROCEDURE — 6360000002 HC RX W HCPCS: Performed by: INTERNAL MEDICINE

## 2024-09-22 PROCEDURE — 85025 COMPLETE CBC W/AUTO DIFF WBC: CPT

## 2024-09-22 PROCEDURE — 83735 ASSAY OF MAGNESIUM: CPT

## 2024-09-22 PROCEDURE — 83521 IG LIGHT CHAINS FREE EACH: CPT

## 2024-09-22 PROCEDURE — 6370000000 HC RX 637 (ALT 250 FOR IP): Performed by: INTERNAL MEDICINE

## 2024-09-22 PROCEDURE — 99254 IP/OBS CNSLTJ NEW/EST MOD 60: CPT | Performed by: STUDENT IN AN ORGANIZED HEALTH CARE EDUCATION/TRAINING PROGRAM

## 2024-09-22 PROCEDURE — 6370000000 HC RX 637 (ALT 250 FOR IP): Performed by: NURSE PRACTITIONER

## 2024-09-22 PROCEDURE — 80069 RENAL FUNCTION PANEL: CPT

## 2024-09-22 PROCEDURE — 84165 PROTEIN E-PHORESIS SERUM: CPT

## 2024-09-22 PROCEDURE — 94760 N-INVAS EAR/PLS OXIMETRY 1: CPT

## 2024-09-22 PROCEDURE — 36415 COLL VENOUS BLD VENIPUNCTURE: CPT

## 2024-09-22 PROCEDURE — 84155 ASSAY OF PROTEIN SERUM: CPT

## 2024-09-22 RX ORDER — FUROSEMIDE 10 MG/ML
20 INJECTION INTRAMUSCULAR; INTRAVENOUS ONCE
Status: COMPLETED | OUTPATIENT
Start: 2024-09-22 | End: 2024-09-22

## 2024-09-22 RX ADMIN — POTASSIUM & SODIUM PHOSPHATES POWDER PACK 280-160-250 MG 250 MG: 280-160-250 PACK at 17:49

## 2024-09-22 RX ADMIN — MAGNESIUM HYDROXIDE 30 ML: 400 SUSPENSION ORAL at 02:20

## 2024-09-22 RX ADMIN — ACETAMINOPHEN 325MG 650 MG: 325 TABLET ORAL at 20:45

## 2024-09-22 RX ADMIN — LEVOTHYROXINE SODIUM 112 MCG: 0.11 TABLET ORAL at 05:59

## 2024-09-22 RX ADMIN — ATORVASTATIN CALCIUM 40 MG: 40 TABLET, FILM COATED ORAL at 20:36

## 2024-09-22 RX ADMIN — ASPIRIN 81 MG: 81 TABLET, COATED ORAL at 08:46

## 2024-09-22 RX ADMIN — POTASSIUM & SODIUM PHOSPHATES POWDER PACK 280-160-250 MG 250 MG: 280-160-250 PACK at 08:45

## 2024-09-22 RX ADMIN — LOSARTAN POTASSIUM 50 MG: 50 TABLET, FILM COATED ORAL at 08:46

## 2024-09-22 RX ADMIN — ENOXAPARIN SODIUM 40 MG: 100 INJECTION SUBCUTANEOUS at 08:45

## 2024-09-22 RX ADMIN — FUROSEMIDE 20 MG: 10 INJECTION, SOLUTION INTRAMUSCULAR; INTRAVENOUS at 10:27

## 2024-09-22 RX ADMIN — SENNOSIDES AND DOCUSATE SODIUM 1 TABLET: 50; 8.6 TABLET ORAL at 08:46

## 2024-09-22 NOTE — PROGRESS NOTES
ONCOLOGY HEMATOLOGY CARE PROGRESS NOTE      SUBJECTIVE:  Patient feeling a bit better today.  She denies any shortness of breath or chest pain    ROS:     Constitutional:  No weight loss, No fever, No chills, No night sweats.  Energy level good.  Eyes:  No impairment or change in vision  ENT / Mouth:  No pain, abnormal ulceration, bleeding, nasal drip or change in voice or hearing  Cardiovascular:  No chest pain, palpitations, new edema, or calf discomfort  Respiratory:  No pain, hemoptysis, change to breathing  Breast:  No pain, discharge, change in appearance or texture  Gastrointestinal:  No pain, cramping, jaundice, change to eating and bowel habits  Urinary:  No pain, bleeding or change in continence  Genitalia: No pain, bleeding or discharge  Musculoskeletal:  No redness, pain, edema or weakness  Skin:  No pruritus, rash, change to nodules or lesions  Neurologic:  No discomfort, change in mental status, speech, sensory or motor activity  Psychiatric:  No change in concentration or change to affect or mood  Endocrine:  No hot flashes, increased thirst, or change to urine production  Hematologic: No petechiae, ecchymosis or bleeding  Lymphatic:  No lymphadenopathy or lymphedema  Allergy / Immunologic:  No eczema, hives, frequent or recurrent infections    OBJECTIVE        Physical    VITALS:  Patient Vitals for the past 24 hrs:   BP Temp Temp src Pulse Resp SpO2 Weight   09/22/24 0845 (!) 148/84 98.4 °F (36.9 °C) Oral 85 14 93 % --   09/22/24 0546 -- -- -- -- -- -- 93 kg (205 lb 0.4 oz)   09/21/24 2012 (!) 144/97 98.5 °F (36.9 °C) Oral 80 16 94 % --   09/21/24 1825 119/85 98.7 °F (37.1 °C) Oral (!) 144 18 95 % --       24HR INTAKE/OUTPUT:    Intake/Output Summary (Last 24 hours) at 9/22/2024 1144  Last data filed at 9/22/2024 0845  Gross per 24 hour   Intake 1780 ml   Output --   Net 1780 ml       CONSTITUTIONAL: awake, alert, cooperative, no apparent distress   EYES: pupils  obesity due to excess calories    Psoriasis (a type of skin inflammation)    Calculus of gallbladder with acute cholecystitis without obstruction    Hyperlipidemia, mixed    Chronic seasonal allergic rhinitis due to pollen    De Quervain's tenosynovitis, right    Carpal tunnel syndrome, right    Chest pain    Adenopathy    Primary hypertension    Arthralgia of hand, right    Splenomegaly    Lymphadenopathy, abdominal    Hypercalcemia    JAMEY (acute kidney injury) (HCC)    Electrolyte abnormality       ASSESSMENT AND PLAN:  1.  Hypercalcemia.  She received Zometa.  Her calcium is improving.  Her PTH is normal suggesting that this is not primary hyperparathyroidism.  Her PTH related peptide is pending.  Sarcoidosis is a possibility.    2.  Lymphadenopathy\splenic lesions.  I spoke with general surgery.  He and I both do not want to rush into a splenectomy.  I will order a bone marrow biopsy for tomorrow and I will consult GI for another EUS guided biopsy.  Again this could be lymphoma versus carcinoma versus sarcoidosis.This has all been stable over the last 2 to 3 months.              ONCOLOGIC DISPOSITION:      Pankaj Best Jr, MD  Please contact through [employer]

## 2024-09-22 NOTE — CONSULTS
GENERAL SURGERY  Consult Note    Patient Name: Sarah Thapa  MRN: [id number]  YOB: 1960   Date of Evaluation: 9/22/2024  Primary Care Physician: Marvel Arceo DO    Referred by: Hospitalist    Chief Complaint   Patient presents with    Abnormal Lab     Pt to ED with CC of hypercalcemia.  Denies sx.          SUBJECTIVE:     Sarah is a 63 y.o. year-old female.  She has history of idiopathic lymphadenopathy, for which she has been followed by oncology (Dr. Best).  This was of unclear etiology and was being followed with serial CT scans.  She presented for outpatient blood work and was found to be hypercalcemic, prompting hospital admission and ongoing workup.  Certainly, the hypercalcemia raises concern for underlying malignancy.  Lymphadenopathy has been biopsied in the past via EUS/FNA, and was nondiagnostic.    On interview, the patient has virtually no complaints.  She denies pain.  She has no fever or chills, nausea or vomiting.  She denies weight loss.      REVIEW OF SYSTEMS  A comprehensive review of systems was completed and is negative except for any elements noted above.    Past Medical History:   Diagnosis Date    Arthralgia of hand, right     Chronic seasonal allergic rhinitis due to pollen     Edema of both legs     Essential hypertension     Hyperlipidemia, mixed     past hx BORDERLINE    Hypothyroidism (acquired)     Non morbid obesity due to excess calories     Psoriasis (a type of skin inflammation)     Psoriatic arthritis (HCC)      Past Surgical History:   Procedure Laterality Date    ACHILLES TENDON SURGERY Left 2/2/2021    EXCISION POSTERIOR CALCANEAL EXOSTOSIS LEFT, DEBRIDEMENT AND REPAIR LEFT ACHILLES performed by Austin Duarte DPM at [facility] OR    BRONCHOSCOPY  06/05/2017    CHOLECYSTECTOMY, LAPAROSCOPIC  10/12/2016    with milena / Dr. Archibald.     FOOT SURGERY Left     Left cyst removal    HEEL SPUR SURGERY Left 02/02/2021    Dr. Duarte    HYSTERECTOMY  [place], [place] [address] Phone: [phone]     CT CHEST ABDOMEN PELVIS W CONTRAST Additional Contrast? Oral    Result Date: 9/14/2024  1. Few new solid nodules inferiorly in the right middle lobe measuring up to 3 mm.  Otherwise, numerous randomly distributed solid pulmonary nodules, largest measuring 5 mm in the left lower lobe, are unchanged compared to CT chest abdomen pelvis 06/29/2024. 2. Similar splenomegaly with innumerable small hypodense lesions scattered throughout the spleen. 3. Lymphadenopathy in the chest, abdomen and pelvis is stable from most recent comparison.  No definite new or enlarging lymph nodes.  Again differential considerations include an infectious process (to include atypical/fungal infection), metastatic disease, or lymphoproliferative process to include lymphoma. 4. Additional chronic findings as above. RECOMMENDATIONS: Consider tissue sampling versus continued short interval follow-up imaging.        ASSESSMENT & PLAN:     Sarah Thapa is a 63 y.o. year-old female.  She has history of diffuse lymphadenopathy, which is of unclear etiology and has been followed by oncology (Dr. Best).  More recently, she was found to be hypercalcemic, raising the concern for underlying malignancy.  She was admitted for management of the hypercalcemia.  Recent CT of the chest, abdomen, and pelvis was personally reviewed.  There is diffuse lymphadenopathy, including retroperitoneal lymph nodes, a prominent left axillary lymph node, and splenomegaly with multiple scattered lesions.  A prior GI performed EUS/FNA was nondiagnostic.    Lymphadenopathy  - imaging reviewed, as above  - case discussed with Dr. Best  - PTHrp pending, PTH WNL suggesting against primary hyperparathyroidism  - would recommend against splenectomy for diagnostic purposes at this time, given the fact that there are targetable lymph nodes and asplenia is not benign  - Left axillary lymph node is palpable, and may be a

## 2024-09-22 NOTE — PROGRESS NOTES
[facility]  Phone: [phone]  Fax: [phone]  [employer]         Reason for Consult: Hypercalcemia  Requesting Physician:  Dr. ARREOLA      History Obtained From:  patient, electronic medical record    History of Present Ilness: 63-year-old white female with history of hypertension, Psoriatec arthritis who was hospitalized in July and discovered to have generalized lymphadenopathy and splenomegaly a EUS guided periportal lymph node biopsy was done that was negative for lymphoma  She has been followed by Dr. Best and Dr. Fairchild  Her calcium was 10.6 mg in July  She had outpatient labs done by her rheumatologist which revealed elevated calcium of 13.1 and she was asked to come to the ER Also noted to have creatinine of 1.51 mg potassium of 3.2  She had recently been started on losartan HCTZ on August 15  She does not take any calcium supplementation  She has low-grade fever off and on, denies night sweats, weight loss  She has had some constipation denies difficulty concentrating    Review of Systems:  feels better + constipation   No urinary symptoms Ca++ improving    at bedside    Physical exam:   Constitutional:  VITALS:  /83   Pulse 82   Temp 98.5 °F (36.9 °C) (Oral)   Resp 14   Ht 1.6 m (5' 3\")   Wt 93 kg (205 lb 0.4 oz)   SpO2 95%   BMI 36.32 kg/m²   Gen: alert, awake, nad  Skin: no rash, turgor wnl  Heent:  eomi, mmm  Neck: no bruits or jvd noted, thyroid normal  Cardiovascular:  S1, S2 without m/r/g  Respiratory: CTA B without w/r/r; respiratory effort normal  Abdomen:  +bs, soft, nt  Ext: trace lower extremity edema    Data/  CBC:   Lab Results   Component Value Date/Time    WBC 8.5 09/22/2024 06:02 AM    RBC 3.27 09/22/2024 06:02 AM    HGB 10.0 09/22/2024 06:02 AM    HCT 28.9 09/22/2024 06:02 AM    MCV 88.2 09/22/2024 06:02 AM    MCH 30.7 09/22/2024 06:02 AM    MCHC 34.8 09/22/2024 06:02 AM    RDW 13.4 09/22/2024 06:02 AM     09/22/2024

## 2024-09-22 NOTE — PROGRESS NOTES
Patient resting in bed. A&OX4, VSS. Pt denies pain at this time. AM medications administered as ordered (see eMAR). IV flushed and capped. HTT Assessment complete. Pt denies any further needs at this time. Fall precautions in place.

## 2024-09-22 NOTE — PLAN OF CARE
Instruct family/caregiver on patient safety     Problem: Pain  Goal: Verbalizes/displays adequate comfort level or baseline comfort level  9/22/2024 0133 by Taya Macedo, RN  Outcome: Progressing  Flowsheets (Taken 9/21/2024 1435 by Jackie Romero, RN)  Verbalizes/displays adequate comfort level or baseline comfort level:   Encourage patient to monitor pain and request assistance   Administer analgesics based on type and severity of pain and evaluate response   Consider cultural and social influences on pain and pain management   Assess pain using appropriate pain scale   Implement non-pharmacological measures as appropriate and evaluate response  9/21/2024 1435 by Jackie Romero, RN  Outcome: Progressing  Flowsheets (Taken 9/21/2024 1435)  Verbalizes/displays adequate comfort level or baseline comfort level:   Encourage patient to monitor pain and request assistance   Administer analgesics based on type and severity of pain and evaluate response   Consider cultural and social influences on pain and pain management   Assess pain using appropriate pain scale   Implement non-pharmacological measures as appropriate and evaluate response     Problem: ABCDS Injury Assessment  Goal: Absence of physical injury  Outcome: Progressing  Flowsheets (Taken 9/22/2024 0133)  Absence of Physical Injury: Implement safety measures based on patient assessment

## 2024-09-22 NOTE — PROGRESS NOTES
Page sent to Dr. Cramer patient c/o BLE edema, 5 LB weight gain in one day. Can patient wear MARYLOU Hose or SCDs?    MD ordered knee high marylou hose for patient.

## 2024-09-22 NOTE — PLAN OF CARE
Problem: Discharge Planning  Goal: Discharge to home or other facility with appropriate resources  9/22/2024 1333 by Zehra Landin RN  Outcome: Progressing  Flowsheets (Taken 9/22/2024 0846)  Discharge to home or other facility with appropriate resources: Identify barriers to discharge with patient and caregiver  9/22/2024 0133 by Taya Macedo RN  Outcome: Progressing  Flowsheets (Taken 9/21/2024 1435 by Jackie Romero, RN)  Discharge to home or other facility with appropriate resources:   Identify barriers to discharge with patient and caregiver   Identify discharge learning needs (meds, wound care, etc)   Refer to discharge planning if patient needs post-hospital services based on physician order or complex needs related to functional status, cognitive ability or social support system   Arrange for needed discharge resources and transportation as appropriate     Problem: Safety - Adult  Goal: Free from fall injury  9/22/2024 1333 by Zehra Landin RN  Outcome: Progressing  9/22/2024 0133 by Taya Macedo RN  Outcome: Progressing  Flowsheets  Taken 9/22/2024 0133  Free From Fall Injury:   Instruct family/caregiver on patient safety   Based on caregiver fall risk screen, instruct family/caregiver to ask for assistance with transferring infant if caregiver noted to have fall risk factors  Taken 9/22/2024 0128  Free From Fall Injury:   Based on caregiver fall risk screen, instruct family/caregiver to ask for assistance with transferring infant if caregiver noted to have fall risk factors   Instruct family/caregiver on patient safety     Problem: Pain  Goal: Verbalizes/displays adequate comfort level or baseline comfort level  9/22/2024 1333 by Zehra Landin RN  Outcome: Progressing  9/22/2024 0133 by Taya Macedo RN  Outcome: Progressing  Flowsheets (Taken 9/21/2024 1435 by Jackie Romero, RN)  Verbalizes/displays adequate comfort level or baseline comfort level:   Encourage patient to

## 2024-09-22 NOTE — PROGRESS NOTES
PT AAO x4. VSS.Pt tolerating diet. Pt denies pain just discomfort but tolerable per this shift. Pt has a 5 lb weight gain since admission. Pt stating, \" I feel like I'm retaining fluid, my ankles are more swollen.\" NP NEIL Edwards notified and of c/o constipation. NP giving orders for morning Mds to managed weight gain. Pt having large BM. Pt awake most of shift. No further needs voiced at this time. Fall precautions in place. Bed alarm on. Call light within reach. Will continue care. Electronically signed by Taya Macedo RN on 9/22/2024 at 8:03 AM

## 2024-09-22 NOTE — PROGRESS NOTES
(9/21) 16.3 pg/mL  PTH-related peptide (9/21) pending  Angiotensin Converting Enzyme (9/22) pending     CRP (9/20) < 5.0 mg/L  HIV screen (9/21/24) non-reactive     CT chest / abd / pelvis (9/14)  Few new solid nodules inferiorly in the right middle lobe measuring up to 3 mm.  Otherwise, numerous randomly distributed solid pulmonary nodules, largest measuring 5 mm in the left lower lobe, are unchanged compared to CT chest abdomen pelvis 06/29/2024. Similar splenomegaly with innumerable small hypodense lesions scattered throughout the spleen. Lymphadenopathy in the chest, abdomen and pelvis is stable from most recent comparison.  No definite new or enlarging lymph nodes.  Again differential considerations include an infectious  process (to include atypical/fungal infection), metastatic disease, or lymphoproliferative process to include lymphoma.    Objective:   Vitals:  /84   Pulse 85   Temp 98.4 °F (oral)   Resp 14   Ht 5' 3\"  Wt 93 kg  SpO2 93% on RA  BMI 36.32 kg/m²   General appearance: alert and cooperative with exam  Lungs: clear to auscultation bilaterally  Heart: regular rate and rhythm, S1, S2 normal, no murmur, click, rub or gallop  Abdomen: soft, non-tender; bowel sounds normal; no masses,  no organomegaly  Extremities: extremities normal, atraumatic, no cyanosis or edema  Neurologic: No obvious focal neurologic deficits.    Assessment and Plan:   Hypercalcemia.  Normal iPTH with PTH related peptid pending.  Elevated [place] and [place] free light chains with normal ratio.  Dose of Zometa in addition to volume expansion with IV crystalloid with calcium decreased to 10.9 mg/dL.   Acute kidney injury resolved with IV crystalloid.  Patient takes chronic meloxicam 15 mg daily and wants this to be given in the hospital, restarted.  BUN/Cr normalized.   Splenomegaly with abdominal lymphadenopathy, biopsy negative for lymphoma.  No weight loss. Follows with Dr. Best for Hem/Onc and Dr. Fairchild for  possible infection.  CT abdomen pelvis noted to have retroperitoneal as well as angel hepatis adenopathy.  EGD with EUS with fine-needle aspiration showing small lymphocytes, cytology no malignant cells noted, flow cytometry was negative.    Psoriatic arthritis on apremilast (Otezla) 30 mg BID,  brining in from home.   Stable pulmonary nodules and right borderline hilar lymphadenopathy.   Splenomegaly with multiple small nodules spread throughout the spleen.  Infectious disease workup has been progressing as outpatient.   Hypokalemia:  Potassium chloride oral replacement to goal K+ > 3.5 mmol/L  She had been told her potassium \"runs low\"  Hypomagnesemia:  Replaced with IV magnesium sulfate to goal Mg > 2.0 mg/dL.   Hypothyroid (TSH 5.94 / FT4 1.8) on levothyroxine 112 mcg daily.   Primary hypertension on Hyzaar, held on admission for JAMEY and hypercalcemia.  Restarted losartan but holding on thiazide due to hypercalcemia.  Given dose of furosemide.   Dyslipidemia on atorvastatin 40 mg daily.      Advance Directive: Full Code  DVT prophylaxis with enoxaparin 40 mg sub-Q daily.   Discharge plannin-2 days inpatient status      KELLI ARREOLA MD  [facility] Hospitalist

## 2024-09-23 ENCOUNTER — APPOINTMENT (OUTPATIENT)
Dept: CT IMAGING | Age: 64
DRG: 629 | End: 2024-09-23
Payer: COMMERCIAL

## 2024-09-23 ENCOUNTER — ANESTHESIA (OUTPATIENT)
Dept: OPERATING ROOM | Age: 64
DRG: 629 | End: 2024-09-23
Payer: COMMERCIAL

## 2024-09-23 ENCOUNTER — ANESTHESIA EVENT (OUTPATIENT)
Dept: OPERATING ROOM | Age: 64
DRG: 629 | End: 2024-09-23
Payer: COMMERCIAL

## 2024-09-23 PROBLEM — R59.1 LYMPHADENOPATHY: Status: ACTIVE | Noted: 2024-07-09

## 2024-09-23 LAB
1,25(OH)2D3 SERPL-MCNC: 85 PG/ML (ref 19.9–79.3)
ACE SERPL-CCNC: 115 U/L (ref 16–85)
ALBUMIN SERPL ELPH-MCNC: 2.6 G/DL (ref 3.1–4.9)
ALBUMIN SERPL-MCNC: 3.5 G/DL (ref 3.4–5)
ALPHA1 GLOB SERPL ELPH-MCNC: 0.3 G/DL (ref 0.2–0.4)
ALPHA2 GLOB SERPL ELPH-MCNC: 0.9 G/DL (ref 0.4–1.1)
ANION GAP SERPL CALCULATED.3IONS-SCNC: 11 MMOL/L (ref 3–16)
B-GLOBULIN SERPL ELPH-MCNC: 0.9 G/DL (ref 0.9–1.6)
BASOPHILS # BLD: 0.1 K/UL (ref 0–0.2)
BASOPHILS NFR BLD: 1.2 %
BUN SERPL-MCNC: 12 MG/DL (ref 7–20)
CALCIUM SERPL-MCNC: 10.8 MG/DL (ref 8.3–10.6)
CHLORIDE SERPL-SCNC: 107 MMOL/L (ref 99–110)
CO2 SERPL-SCNC: 22 MMOL/L (ref 21–32)
CREAT SERPL-MCNC: 1 MG/DL (ref 0.6–1.2)
DEPRECATED RDW RBC AUTO: 13.7 % (ref 12.4–15.4)
EOSINOPHIL # BLD: 0.4 K/UL (ref 0–0.6)
EOSINOPHIL NFR BLD: 4.4 %
GAMMA GLOB SERPL ELPH-MCNC: 1.4 G/DL (ref 0.6–1.8)
GFR SERPLBLD CREATININE-BSD FMLA CKD-EPI: 63 ML/MIN/{1.73_M2}
GLUCOSE SERPL-MCNC: 89 MG/DL (ref 70–99)
HCT VFR BLD AUTO: 29 % (ref 36–48)
HCT VFR BLD AUTO: 33.5 % (ref 36–48)
HGB BLD-MCNC: 11.1 G/DL (ref 12–16)
IMMATURE RETIC FRACT: 0.47 (ref 0.21–0.37)
INR PPP: 1.08 (ref 0.85–1.15)
LYMPHOCYTES # BLD: 1.9 K/UL (ref 1–5.1)
LYMPHOCYTES NFR BLD: 20.1 %
MAGNESIUM SERPL-MCNC: 1.68 MG/DL (ref 1.8–2.4)
MCH RBC QN AUTO: 29.6 PG (ref 26–34)
MCHC RBC AUTO-ENTMCNC: 33.1 G/DL (ref 31–36)
MCV RBC AUTO: 89.4 FL (ref 80–100)
MONOCYTES # BLD: 1.5 K/UL (ref 0–1.3)
MONOCYTES NFR BLD: 16 %
NEUTROPHILS # BLD: 5.6 K/UL (ref 1.7–7.7)
NEUTROPHILS NFR BLD: 58.3 %
PHOSPHATE SERPL-MCNC: 3.4 MG/DL (ref 2.5–4.9)
PLATELET # BLD AUTO: 247 K/UL (ref 135–450)
PMV BLD AUTO: 8.1 FL (ref 5–10.5)
POTASSIUM SERPL-SCNC: 3.2 MMOL/L (ref 3.5–5.1)
PROT SERPL-MCNC: 6.1 G/DL (ref 6.4–8.2)
PROTHROMBIN TIME: 14.2 SEC (ref 11.9–14.9)
RBC # BLD AUTO: 3.75 M/UL (ref 4–5.2)
RETICS # AUTO: 0.05 M/UL (ref 0.02–0.1)
RETICS/RBC NFR AUTO: 1.38 % (ref 0.5–2.18)
SODIUM SERPL-SCNC: 140 MMOL/L (ref 136–145)
SPE/IFE INTERPRETATION: NORMAL
WBC # BLD AUTO: 9.6 K/UL (ref 4–11)

## 2024-09-23 PROCEDURE — 88341 IMHCHEM/IMCYTCHM EA ADD ANTB: CPT

## 2024-09-23 PROCEDURE — 2709999900 CT BIOPSY BONE MARROW

## 2024-09-23 PROCEDURE — 2580000003 HC RX 258: Performed by: STUDENT IN AN ORGANIZED HEALTH CARE EDUCATION/TRAINING PROGRAM

## 2024-09-23 PROCEDURE — 7100000001 HC PACU RECOVERY - ADDTL 15 MIN: Performed by: STUDENT IN AN ORGANIZED HEALTH CARE EDUCATION/TRAINING PROGRAM

## 2024-09-23 PROCEDURE — 83735 ASSAY OF MAGNESIUM: CPT

## 2024-09-23 PROCEDURE — 88311 DECALCIFY TISSUE: CPT

## 2024-09-23 PROCEDURE — 6360000002 HC RX W HCPCS

## 2024-09-23 PROCEDURE — 2500000003 HC RX 250 WO HCPCS

## 2024-09-23 PROCEDURE — 88312 SPECIAL STAINS GROUP 1: CPT

## 2024-09-23 PROCEDURE — 6360000002 HC RX W HCPCS: Performed by: STUDENT IN AN ORGANIZED HEALTH CARE EDUCATION/TRAINING PROGRAM

## 2024-09-23 PROCEDURE — 6370000000 HC RX 637 (ALT 250 FOR IP): Performed by: INTERNAL MEDICINE

## 2024-09-23 PROCEDURE — 88185 FLOWCYTOMETRY/TC ADD-ON: CPT

## 2024-09-23 PROCEDURE — 88313 SPECIAL STAINS GROUP 2: CPT

## 2024-09-23 PROCEDURE — 3600000012 HC SURGERY LEVEL 2 ADDTL 15MIN: Performed by: STUDENT IN AN ORGANIZED HEALTH CARE EDUCATION/TRAINING PROGRAM

## 2024-09-23 PROCEDURE — 88305 TISSUE EXAM BY PATHOLOGIST: CPT

## 2024-09-23 PROCEDURE — 3600000002 HC SURGERY LEVEL 2 BASE: Performed by: STUDENT IN AN ORGANIZED HEALTH CARE EDUCATION/TRAINING PROGRAM

## 2024-09-23 PROCEDURE — 07B60ZX EXCISION OF LEFT AXILLARY LYMPHATIC, OPEN APPROACH, DIAGNOSTIC: ICD-10-PCS | Performed by: STUDENT IN AN ORGANIZED HEALTH CARE EDUCATION/TRAINING PROGRAM

## 2024-09-23 PROCEDURE — 88342 IMHCHEM/IMCYTCHM 1ST ANTB: CPT

## 2024-09-23 PROCEDURE — 88184 FLOWCYTOMETRY/ TC 1 MARKER: CPT

## 2024-09-23 PROCEDURE — A4217 STERILE WATER/SALINE, 500 ML: HCPCS | Performed by: STUDENT IN AN ORGANIZED HEALTH CARE EDUCATION/TRAINING PROGRAM

## 2024-09-23 PROCEDURE — 1200000000 HC SEMI PRIVATE

## 2024-09-23 PROCEDURE — 80069 RENAL FUNCTION PANEL: CPT

## 2024-09-23 PROCEDURE — 2580000003 HC RX 258: Performed by: INTERNAL MEDICINE

## 2024-09-23 PROCEDURE — 2709999900 HC NON-CHARGEABLE SUPPLY: Performed by: STUDENT IN AN ORGANIZED HEALTH CARE EDUCATION/TRAINING PROGRAM

## 2024-09-23 PROCEDURE — 85610 PROTHROMBIN TIME: CPT

## 2024-09-23 PROCEDURE — 85025 COMPLETE CBC W/AUTO DIFF WBC: CPT

## 2024-09-23 PROCEDURE — 85045 AUTOMATED RETICULOCYTE COUNT: CPT

## 2024-09-23 PROCEDURE — 6360000002 HC RX W HCPCS: Performed by: RADIOLOGY

## 2024-09-23 PROCEDURE — 7100000000 HC PACU RECOVERY - FIRST 15 MIN: Performed by: STUDENT IN AN ORGANIZED HEALTH CARE EDUCATION/TRAINING PROGRAM

## 2024-09-23 PROCEDURE — 3700000000 HC ANESTHESIA ATTENDED CARE: Performed by: STUDENT IN AN ORGANIZED HEALTH CARE EDUCATION/TRAINING PROGRAM

## 2024-09-23 PROCEDURE — 94760 N-INVAS EAR/PLS OXIMETRY 1: CPT

## 2024-09-23 PROCEDURE — 36415 COLL VENOUS BLD VENIPUNCTURE: CPT

## 2024-09-23 PROCEDURE — 3700000001 HC ADD 15 MINUTES (ANESTHESIA): Performed by: STUDENT IN AN ORGANIZED HEALTH CARE EDUCATION/TRAINING PROGRAM

## 2024-09-23 RX ORDER — SODIUM CHLORIDE, SODIUM LACTATE, POTASSIUM CHLORIDE, CALCIUM CHLORIDE 600; 310; 30; 20 MG/100ML; MG/100ML; MG/100ML; MG/100ML
INJECTION, SOLUTION INTRAVENOUS CONTINUOUS
Status: DISCONTINUED | OUTPATIENT
Start: 2024-09-23 | End: 2024-09-23 | Stop reason: HOSPADM

## 2024-09-23 RX ORDER — FENTANYL CITRATE 50 UG/ML
INJECTION, SOLUTION INTRAMUSCULAR; INTRAVENOUS PRN
Status: COMPLETED | OUTPATIENT
Start: 2024-09-23 | End: 2024-09-23

## 2024-09-23 RX ORDER — SODIUM CHLORIDE 9 MG/ML
INJECTION, SOLUTION INTRAVENOUS PRN
Status: DISCONTINUED | OUTPATIENT
Start: 2024-09-23 | End: 2024-09-23 | Stop reason: HOSPADM

## 2024-09-23 RX ORDER — FENTANYL CITRATE 50 UG/ML
INJECTION, SOLUTION INTRAMUSCULAR; INTRAVENOUS
Status: DISCONTINUED | OUTPATIENT
Start: 2024-09-23 | End: 2024-09-23 | Stop reason: SDUPTHER

## 2024-09-23 RX ORDER — MIDAZOLAM HYDROCHLORIDE 1 MG/ML
INJECTION INTRAMUSCULAR; INTRAVENOUS PRN
Status: COMPLETED | OUTPATIENT
Start: 2024-09-23 | End: 2024-09-23

## 2024-09-23 RX ORDER — ONDANSETRON 2 MG/ML
4 INJECTION INTRAMUSCULAR; INTRAVENOUS
Status: DISCONTINUED | OUTPATIENT
Start: 2024-09-23 | End: 2024-09-23 | Stop reason: HOSPADM

## 2024-09-23 RX ORDER — NALOXONE HYDROCHLORIDE 0.4 MG/ML
INJECTION, SOLUTION INTRAMUSCULAR; INTRAVENOUS; SUBCUTANEOUS PRN
Status: DISCONTINUED | OUTPATIENT
Start: 2024-09-23 | End: 2024-09-23 | Stop reason: HOSPADM

## 2024-09-23 RX ORDER — MAGNESIUM HYDROXIDE 1200 MG/15ML
LIQUID ORAL CONTINUOUS PRN
Status: COMPLETED | OUTPATIENT
Start: 2024-09-23 | End: 2024-09-23

## 2024-09-23 RX ORDER — ONDANSETRON 2 MG/ML
INJECTION INTRAMUSCULAR; INTRAVENOUS
Status: DISCONTINUED | OUTPATIENT
Start: 2024-09-23 | End: 2024-09-23 | Stop reason: SDUPTHER

## 2024-09-23 RX ORDER — SODIUM CHLORIDE 0.9 % (FLUSH) 0.9 %
5-40 SYRINGE (ML) INJECTION PRN
Status: DISCONTINUED | OUTPATIENT
Start: 2024-09-23 | End: 2024-09-23 | Stop reason: HOSPADM

## 2024-09-23 RX ORDER — DEXAMETHASONE SODIUM PHOSPHATE 4 MG/ML
INJECTION, SOLUTION INTRA-ARTICULAR; INTRALESIONAL; INTRAMUSCULAR; INTRAVENOUS; SOFT TISSUE
Status: DISCONTINUED | OUTPATIENT
Start: 2024-09-23 | End: 2024-09-23 | Stop reason: SDUPTHER

## 2024-09-23 RX ORDER — PROPOFOL 10 MG/ML
INJECTION, EMULSION INTRAVENOUS
Status: DISCONTINUED | OUTPATIENT
Start: 2024-09-23 | End: 2024-09-23 | Stop reason: SDUPTHER

## 2024-09-23 RX ORDER — FENTANYL CITRATE 0.05 MG/ML
25 INJECTION, SOLUTION INTRAMUSCULAR; INTRAVENOUS EVERY 5 MIN PRN
Status: DISCONTINUED | OUTPATIENT
Start: 2024-09-23 | End: 2024-09-23 | Stop reason: HOSPADM

## 2024-09-23 RX ORDER — SODIUM CHLORIDE 0.9 % (FLUSH) 0.9 %
5-40 SYRINGE (ML) INJECTION EVERY 12 HOURS SCHEDULED
Status: DISCONTINUED | OUTPATIENT
Start: 2024-09-23 | End: 2024-09-23 | Stop reason: HOSPADM

## 2024-09-23 RX ORDER — LIDOCAINE HYDROCHLORIDE 20 MG/ML
INJECTION, SOLUTION EPIDURAL; INFILTRATION; INTRACAUDAL; PERINEURAL
Status: DISCONTINUED | OUTPATIENT
Start: 2024-09-23 | End: 2024-09-23 | Stop reason: SDUPTHER

## 2024-09-23 RX ORDER — BUPIVACAINE HYDROCHLORIDE 5 MG/ML
INJECTION, SOLUTION EPIDURAL; INTRACAUDAL
Status: COMPLETED | OUTPATIENT
Start: 2024-09-23 | End: 2024-09-23

## 2024-09-23 RX ORDER — ENOXAPARIN SODIUM 100 MG/ML
40 INJECTION SUBCUTANEOUS ONCE
Status: COMPLETED | OUTPATIENT
Start: 2024-09-23 | End: 2024-09-23

## 2024-09-23 RX ADMIN — ONDANSETRON 4 MG: 2 INJECTION INTRAMUSCULAR; INTRAVENOUS at 16:13

## 2024-09-23 RX ADMIN — POTASSIUM CHLORIDE 40 MEQ: 1500 TABLET, EXTENDED RELEASE ORAL at 08:49

## 2024-09-23 RX ADMIN — ACETAMINOPHEN 325MG 650 MG: 325 TABLET ORAL at 11:27

## 2024-09-23 RX ADMIN — SODIUM CHLORIDE: 9 INJECTION, SOLUTION INTRAVENOUS at 16:04

## 2024-09-23 RX ADMIN — FENTANYL CITRATE 25 MCG: 50 INJECTION INTRAMUSCULAR; INTRAVENOUS at 16:09

## 2024-09-23 RX ADMIN — FENTANYL CITRATE 25 MCG: 50 INJECTION INTRAMUSCULAR; INTRAVENOUS at 17:15

## 2024-09-23 RX ADMIN — ATORVASTATIN CALCIUM 40 MG: 40 TABLET, FILM COATED ORAL at 20:14

## 2024-09-23 RX ADMIN — PROPOFOL 200 MG: 10 INJECTION, EMULSION INTRAVENOUS at 16:10

## 2024-09-23 RX ADMIN — SODIUM CHLORIDE 2000 MG: 900 INJECTION INTRAVENOUS at 16:12

## 2024-09-23 RX ADMIN — SODIUM CHLORIDE, POTASSIUM CHLORIDE, SODIUM LACTATE AND CALCIUM CHLORIDE: 600; 310; 30; 20 INJECTION, SOLUTION INTRAVENOUS at 10:58

## 2024-09-23 RX ADMIN — DEXAMETHASONE SODIUM PHOSPHATE 8 MG: 4 INJECTION, SOLUTION INTRAMUSCULAR; INTRAVENOUS at 16:13

## 2024-09-23 RX ADMIN — FENTANYL CITRATE 25 MCG: 50 INJECTION INTRAMUSCULAR; INTRAVENOUS at 16:13

## 2024-09-23 RX ADMIN — SODIUM CHLORIDE, PRESERVATIVE FREE 10 ML: 5 INJECTION INTRAVENOUS at 20:15

## 2024-09-23 RX ADMIN — ACETAMINOPHEN 325MG 650 MG: 325 TABLET ORAL at 22:02

## 2024-09-23 RX ADMIN — LIDOCAINE HYDROCHLORIDE 100 MG: 20 INJECTION, SOLUTION EPIDURAL; INFILTRATION; INTRACAUDAL; PERINEURAL at 16:10

## 2024-09-23 RX ADMIN — FENTANYL CITRATE 25 MCG: 50 INJECTION INTRAMUSCULAR; INTRAVENOUS at 17:03

## 2024-09-23 RX ADMIN — FENTANYL CITRATE 50 MCG: 50 INJECTION INTRAMUSCULAR; INTRAVENOUS at 09:22

## 2024-09-23 RX ADMIN — FENTANYL CITRATE 50 MCG: 50 INJECTION INTRAMUSCULAR; INTRAVENOUS at 16:45

## 2024-09-23 RX ADMIN — FENTANYL CITRATE 50 MCG: 50 INJECTION INTRAMUSCULAR; INTRAVENOUS at 16:23

## 2024-09-23 RX ADMIN — FENTANYL CITRATE 50 MCG: 50 INJECTION INTRAMUSCULAR; INTRAVENOUS at 09:19

## 2024-09-23 RX ADMIN — ENOXAPARIN SODIUM 40 MG: 100 INJECTION SUBCUTANEOUS at 14:20

## 2024-09-23 RX ADMIN — LEVOTHYROXINE SODIUM 112 MCG: 0.11 TABLET ORAL at 08:54

## 2024-09-23 RX ADMIN — SENNOSIDES AND DOCUSATE SODIUM 1 TABLET: 50; 8.6 TABLET ORAL at 20:14

## 2024-09-23 RX ADMIN — LOSARTAN POTASSIUM 50 MG: 50 TABLET, FILM COATED ORAL at 08:55

## 2024-09-23 RX ADMIN — MIDAZOLAM 2 MG: 1 INJECTION INTRAMUSCULAR; INTRAVENOUS at 09:19

## 2024-09-23 RX ADMIN — ACETAMINOPHEN 325MG 650 MG: 325 TABLET ORAL at 05:17

## 2024-09-23 RX ADMIN — SODIUM CHLORIDE, PRESERVATIVE FREE 10 ML: 5 INJECTION INTRAVENOUS at 08:54

## 2024-09-23 ASSESSMENT — PAIN DESCRIPTION - ORIENTATION
ORIENTATION: LEFT
ORIENTATION: POSTERIOR
ORIENTATION: POSTERIOR

## 2024-09-23 ASSESSMENT — PAIN DESCRIPTION - LOCATION
LOCATION: HEAD;NECK
LOCATION: HEAD;NECK
LOCATION: BUTTOCKS

## 2024-09-23 ASSESSMENT — PAIN - FUNCTIONAL ASSESSMENT
PAIN_FUNCTIONAL_ASSESSMENT: ACTIVITIES ARE NOT PREVENTED
PAIN_FUNCTIONAL_ASSESSMENT: ACTIVITIES ARE NOT PREVENTED
PAIN_FUNCTIONAL_ASSESSMENT: 0-10
PAIN_FUNCTIONAL_ASSESSMENT: ACTIVITIES ARE NOT PREVENTED

## 2024-09-23 ASSESSMENT — PAIN SCALES - GENERAL
PAINLEVEL_OUTOF10: 0
PAINLEVEL_OUTOF10: 3
PAINLEVEL_OUTOF10: 6
PAINLEVEL_OUTOF10: 1

## 2024-09-23 ASSESSMENT — PAIN DESCRIPTION - FREQUENCY
FREQUENCY: INTERMITTENT

## 2024-09-23 ASSESSMENT — PAIN DESCRIPTION - PAIN TYPE
TYPE: CHRONIC PAIN
TYPE: CHRONIC PAIN
TYPE: SURGICAL PAIN

## 2024-09-23 ASSESSMENT — PAIN DESCRIPTION - DESCRIPTORS
DESCRIPTORS: ACHING
DESCRIPTORS: ACHING
DESCRIPTORS: THROBBING

## 2024-09-23 ASSESSMENT — PAIN DESCRIPTION - ONSET
ONSET: ON-GOING
ONSET: GRADUAL
ONSET: ON-GOING

## 2024-09-23 NOTE — PROGRESS NOTES
This RN called preop to notify about wedding band that can't be removed from patients finger. Electronically signed by Shirley Beltran RN on 9/23/2024 at 1:38 PM

## 2024-09-23 NOTE — PROGRESS NOTES
ONCOLOGY HEMATOLOGY CARE PROGRESS NOTE      SUBJECTIVE:  Patient is feeling well today.  She denies any shortness of breath or chest pain    ROS:     Constitutional:  No weight loss, No fever, No chills, No night sweats.  Energy level good.  Eyes:  No impairment or change in vision  ENT / Mouth:  No pain, abnormal ulceration, bleeding, nasal drip or change in voice or hearing  Cardiovascular:  No chest pain, palpitations, new edema, or calf discomfort  Respiratory:  No pain, hemoptysis, change to breathing  Breast:  No pain, discharge, change in appearance or texture  Gastrointestinal:  No pain, cramping, jaundice, change to eating and bowel habits  Urinary:  No pain, bleeding or change in continence  Genitalia: No pain, bleeding or discharge  Musculoskeletal:  No redness, pain, edema or weakness  Skin:  No pruritus, rash, change to nodules or lesions  Neurologic:  No discomfort, change in mental status, speech, sensory or motor activity  Psychiatric:  No change in concentration or change to affect or mood  Endocrine:  No hot flashes, increased thirst, or change to urine production  Hematologic: No petechiae, ecchymosis or bleeding  Lymphatic:  No lymphadenopathy or lymphedema  Allergy / Immunologic:  No eczema, hives, frequent or recurrent infections    OBJECTIVE        Physical    VITALS:  Patient Vitals for the past 24 hrs:   BP Temp Temp src Pulse Resp SpO2 Weight   09/23/24 0745 (!) 154/71 98.6 °F (37 °C) Oral 73 15 95 % --   09/23/24 0445 -- -- -- -- -- -- 92.4 kg (203 lb 11.3 oz)   09/22/24 1944 (!) 141/87 99.1 °F (37.3 °C) Oral 86 14 94 % --   09/22/24 1250 134/83 98.5 °F (36.9 °C) Oral 82 14 95 % --   09/22/24 0845 (!) 148/84 98.4 °F (36.9 °C) Oral 85 14 93 % --       24HR INTAKE/OUTPUT:    Intake/Output Summary (Last 24 hours) at 9/23/2024 0822  Last data filed at 9/22/2024 1944  Gross per 24 hour   Intake 950 ml   Output --   Net 950 ml       CONSTITUTIONAL: awake,

## 2024-09-23 NOTE — CONSULTS
Infectious Diseases Inpatient Consult Note      Reason for Consult: Hypercalcemia, lymphadenopathy and splenomegaly    Requesting Physician: Dr. Peters     Primary Care Physician:  Marvel Arceo DO    History Obtained From:  Epic and pt     CHIEF COMPLAINT:     Chief Complaint   Patient presents with    Abnormal Lab     Pt to ED with CC of hypercalcemia.  Denies sx.         HISTORY OF PRESENT ILLNESS:  63 y.o. female with significant history for hypertension, hyperlipidemia, hypothyroidism, psoriasis, psoriatic arthritis, BMI 36  she is currently being evaluated for lymphadenopathy splenomegaly she was recently admitted to [facility] underwent upper GI endoscopy and biopsy of the angel hepatis lymph node was negative for lymphoma it was not conclusive.  CT chest abdomen pelvis indicated multiple lung nodules and splenic lesions she was evaluated in the infectious disease clinic underwent extensive workup in regards to infection have been negative she was supposed to see me in the clinic for follow-up in the meantime she has seen Dr. Mitchell her rheumatologist he has done additional blood work including CMP serum ACE level TB spot.  On routine labs found to have no hypercalcemia and found to be in JAMEY was advised to come into the hospital for further evaluation, for now serum ACE level detected at 125 and vitamin D level is very high.  She also underwent CT-guided bone marrow aspiration.      Past Medical History:    Past Medical History:   Diagnosis Date    Arthralgia of hand, right     Chronic seasonal allergic rhinitis due to pollen     Edema of both legs     Essential hypertension     Hyperlipidemia, mixed     past hx BORDERLINE    Hypothyroidism (acquired)     Non morbid obesity due to excess calories     Psoriasis (a type of skin inflammation)     Psoriatic arthritis (HCC)        Past Surgical History:    Past Surgical History:   Procedure Laterality Date    ACHILLES TENDON SURGERY Left  rhythm, normal S1 and S2, no murmurs, rubs, clicks, or gallops, no carotid bruits  Abdomen: soft, non-tender, non-distended, normal bowel sounds, no masses or organomegaly  Extremities: no cyanosis, clubbing or edema  Musculoskeletal: normal range of motion, no joint swelling, deformity or tenderness  Integumentary: No rashes, no abnormal skin lesions, no petechiae  Neurologic: reflexes normal and symmetric, no cranial nerve deficit  Psych:  Orientation, sensorium, mood normal   Lines: iv     DATA:    CBC:   Lab Results   Component Value Date    WBC 9.6 09/23/2024    HGB 11.1 (L) 09/23/2024    HCT 29.0 (L) 09/23/2024    MCV 89.4 09/23/2024     09/23/2024     RENAL:   Lab Results   Component Value Date    CREATININE 0.9 09/24/2024    BUN 16 09/24/2024     09/24/2024    K 3.8 09/24/2024     09/24/2024    CO2 22 09/24/2024     SED RATE:   Lab Results   Component Value Date/Time    SEDRATE 44 08/21/2024 06:19 PM     CK: No results found for: \"CKTOTAL\"  CRP:   Lab Results   Component Value Date/Time    CRP <5.0 09/20/2024 02:54 PM    CRP 4.0 08/21/2024 06:19 PM     Hepatic Function Panel:   Lab Results   Component Value Date/Time    ALKPHOS 78 09/20/2024 06:01 PM    ALT 31 09/20/2024 06:01 PM    AST 33 09/20/2024 06:01 PM    BILITOT 0.3 09/20/2024 06:01 PM     UA:  Lab Results   Component Value Date/Time    COLORU Yellow 09/20/2024 06:01 PM    CLARITYU Clear 09/20/2024 06:01 PM    GLUCOSEU Negative 09/20/2024 06:01 PM    BILIRUBINUR Negative 09/20/2024 06:01 PM    KETUA Negative 09/20/2024 06:01 PM    BLOODU Negative 09/20/2024 06:01 PM    PHUR 6.5 09/20/2024 06:01 PM    PROTEINU Negative 09/20/2024 06:01 PM    UROBILINOGEN 0.2 09/20/2024 06:01 PM    NITRU Negative 09/20/2024 06:01 PM    LEUKOCYTESUR MODERATE 09/20/2024 06:01 PM    URINETYPE NotGiven 09/20/2024 06:01 PM      Urine Microscopic:   Lab Results   Component Value Date/Time    BACTERIA None Seen 09/20/2024 06:01 PM    HYALCAST 2 09/20/2024

## 2024-09-23 NOTE — PROGRESS NOTES
Surgical consent obtained for left axillary lymph node excisional biopsy w/ Dr. Dobbs. Signed document placed into chart. Patient aware of procedure  time of 1240, procedure time 1340. Patient agreeable, denies further questions at this time.     Patient to IR at this time w/ hospital transport for bone marrow biopsy. Signed consent for bone marrow biopsy not completed per direction of nursing communication. Consent to be obtained by IR. Patient denies needs from this RN prior to departure from unit. Patient in gown w/ snaps & non skid socks. Patient has been NPO since midnight.

## 2024-09-23 NOTE — PROGRESS NOTES
Patient returned from IR.  Patient alert and oriented.  Denied pain at this time. Electronically signed by Shirley Beltran RN on 9/23/2024 at 10:14 AM

## 2024-09-23 NOTE — PLAN OF CARE
Problem: Safety - Adult  Goal: Free from fall injury  9/23/2024 1153 by Shirley Beltran RN  Flowsheets (Taken 9/23/2024 1153)  Free From Fall Injury: Instruct family/caregiver on patient safety  9/22/2024 2254 by Audrey Lundberg RN  Outcome: Progressing  Flowsheets (Taken 9/22/2024 2252)  Free From Fall Injury: Instruct family/caregiver on patient safety     Problem: Pain  Goal: Verbalizes/displays adequate comfort level or baseline comfort level  9/23/2024 1153 by Shirley Beltran RN  Flowsheets (Taken 9/23/2024 1153)  Verbalizes/displays adequate comfort level or baseline comfort level:   Encourage patient to monitor pain and request assistance   Administer analgesics based on type and severity of pain and evaluate response   Consider cultural and social influences on pain and pain management   Assess pain using appropriate pain scale   Implement non-pharmacological measures as appropriate and evaluate response   Notify Licensed Independent Practitioner if interventions unsuccessful or patient reports new pain  9/22/2024 2254 by Audrey Lundberg RN  Outcome: Progressing     Problem: ABCDS Injury Assessment  Goal: Absence of physical injury  9/23/2024 1153 by Shirley Beltran RN  Flowsheets (Taken 9/23/2024 1153)  Absence of Physical Injury: Implement safety measures based on patient assessment  9/22/2024 2254 by Audrey Lundberg RN  Outcome: Progressing  Flowsheets (Taken 9/22/2024 2252)  Absence of Physical Injury: Implement safety measures based on patient assessment

## 2024-09-23 NOTE — PROGRESS NOTES
Patient was picked up by surgery for procedure. Electronically signed by Shirley Beltran RN on 9/23/2024 at 2:06 PM

## 2024-09-23 NOTE — OP NOTE
GENERAL SURGERY  Operative Report    Patient Name: Sarah Thapa  YOB: 1960   MRN: [id number]  Age: 63 y.o.   Sex: female       DATE OF OPERATION: 9/23/24     PREOPERATIVE DIAGNOSIS: Lymphadenopathy [R59.1]    POSTOPERATIVE DIAGNOSIS: Same    PROCEDURE(S) PERFORMED: LEFT  AXILLARY LYMPH NODE EXCISIONAL  BIOPSY     SURGEON: Stefano Dobbs MD  Surgical Assistant: Howie Howell  Scrub Person First: Jennifer Garcia    ANESTHESIA: General      INDICATION:     Sarah Thapa is a 63 y.o. female who presents with diffuse lymphadenopathy, which is of unclear etiology and has been followed by oncology (Dr. Best). More recently, she was found to be hypercalcemic, raising the concern for underlying malignancy. She was admitted for management of the hypercalcemia. Recent CT of the chest, abdomen, and pelvis was personally reviewed. There is diffuse lymphadenopathy, including retroperitoneal lymph nodes, a prominent left axillary lymph node, and splenomegaly with multiple scattered lesions. A prior GI performed EUS/FNA was nondiagnostic.  General surgery was consulted for evaluation for excisional biopsy of an accessible lymph node.    Risks, benefits, and alternatives to the planned surgical procedure were discussed with the patient.  Risks include bleeding, infection, damage to surrounding structures, as well as lymphatic leak.  All questions were answered.  She verbalized understanding and agreed to proceed.    PROCEDURE DETAILS:     After informed consent was obtained, the patient was brought to the operating room and placed in the supine position.  Preoperative antibiotics were given.  General endotracheal anesthesia was induced.  The left axilla was prepped with ChloraPrep and draped in the usual sterile fashion.  Per institutional protocol, a time out was performed for patient and procedure verification.     Physical exam was utilized to palpate the prominent left axillary lymph  node(s), which were located along the lateral chest wall.  A skin incision was made along the medial aspect of the hairbearing area of the left axilla.  This was carried down to the subcutaneous fat using electrocautery.  The clavipectoral fascia was opened, exposing the axillary fat pad.  Again, with digital examination, palpable lymph nodes were noted.  The fat pad was grasped, elevated, and extracorporealized.  Using electrocautery, 2 lymph nodes were dissected from the surrounding tissue.  Metallic clips were placed, both for marking and for control of the lymphatic vessels at the hilum.  The nodes were excised with electrocautery and passed off the field for permanent pathology.    No further bulky lymphadenopathy was palpable.    The wound was copiously irrigated and dried.  Hemostasis was confirmed.  The deep layers were sutured with interrupted 3-0 Vicryl.  The dermis was reapproximated with interrupted 3-0 Vicryl.  The skin was closed with running 4-0 Monocryl.  Skin glue was applied.    The patient tolerated the procedure well.  She emerged from general anesthesia.  All instrument, sponge, needle counts were correct.        FINDING(S): Axillar lymphadenopathy    ESTIMATED BLOOD LOSS: Minimal mL    SPECIMEN:   ID Type Source Tests Collected by Time Destination   A : A- Left Axillary Lymph Node Tissue Tissue SURGICAL PATHOLOGY Stefano Dobbs MD 9/23/2024 9311      IMPLANT(S): * No implants in log *  DRAIN(S): none    DISPOSITION: PACU - hemodynamically stable.           CONDITION: stable        Stefano Dobbs MD  General Surgery [phone]    Electronically signed by Stefano Dobbs MD on 9/23/2024 at 5:13 PM

## 2024-09-23 NOTE — PLAN OF CARE
Problem: Safety - Adult  Goal: Free from fall injury  9/22/2024 2254 by Audrey Lundberg, RN  Outcome: Progressing  Flowsheets (Taken 9/22/2024 2252)  Free From Fall Injury: Instruct family/caregiver on patient safety  9/22/2024 1333 by Zehra Landin RN  Outcome: Progressing     Problem: Pain  Goal: Verbalizes/displays adequate comfort level or baseline comfort level  9/22/2024 2254 by Audrey Lundberg, RN  Outcome: Progressing  9/22/2024 1333 by Zehra Landin RN  Outcome: Progressing     Problem: ABCDS Injury Assessment  Goal: Absence of physical injury  9/22/2024 2254 by Audrey Lundberg, RN  Outcome: Progressing  Flowsheets (Taken 9/22/2024 2252)  Absence of Physical Injury: Implement safety measures based on patient assessment  9/22/2024 1333 by Zehra Landin RN  Outcome: Progressing

## 2024-09-23 NOTE — PROGRESS NOTES
GENERAL SURGERY  Progress Note    Patient Name: Sarah Thapa  MRN: [id number]  YOB: 1960   Date of Evaluation: 9/23/2024  Primary Care Physician: Marvel Arceo DO      Hypercalcemia [E83.52]  Hypokalemia [E87.6]  JAMEY (acute kidney injury) (HCC) [N17.9]    SUBJECTIVE:     Sarah is doing well today.  She is anticipating lymph node biopsy      REVIEW OF SYSTEMS  A comprehensive review of systems was completed and is negative except for any elements noted above.    OBJECTIVE:     BP (!) 173/85   Pulse 70   Temp 98.3 °F (36.8 °C) (Oral)   Resp 16   Ht 1.6 m (5' 3\")   Wt 92.4 kg (203 lb 11.3 oz)   SpO2 97%   BMI 36.08 kg/m²     PHYSICAL EXAM  General/appearance: Awake and alert, in no acute distress  Lungs: Respirations unlabored  Breast: Deferred  Cardiac: Regular rate  Abdomen: soft, non-distended, nontender  Hernia: None  Rectal: Deferred  Incision: None  Extremities: Prominent left axillary lymph node palpable, nontender, rubbery  Neuro: No focal findings  Skin: No rashes or wounds    LABS:     Recent Labs     09/20/24  1801 09/21/24  0712 09/22/24  0602 09/23/24  0422 09/23/24  0428 09/23/24  0749 09/23/24  0750   WBC 11.6*  --  8.5 9.6  --   --   --    HGB 11.6*  --  10.0* 11.1*  --   --   --    HCT 34.1*  --  28.9* 33.5*  --   --  29.0*     --  256 247  --   --   --     138 138  --  140  --   --    K 3.1* 3.5 3.5  --  3.2*  --   --     107 107  --  107  --   --    CO2 25 23 23  --  22  --   --    BUN 21* 18 14  --  12  --   --    CREATININE 1.3* 1.1 1.0  --  1.0  --   --    MG 1.44* 1.34* 1.87  --  1.68*  --   --    PHOS 2.3* 1.9* 2.4*  --  3.4  --   --    CALCIUM 13.3* 11.5* 10.9*  --  10.8*  --   --    INR  --   --   --   --   --  1.08  --    AST 33  --   --   --   --   --   --    ALT 31  --   --   --   --   --   --    BILITOT 0.3  --   --   --   --   --   --    NITRU Negative  --   --   --   --   --   --    COLORU Yellow  --   --   --   --   --   --

## 2024-09-23 NOTE — PROGRESS NOTES
[facility]  Phone: [phone]  Fax: [phone]  [employer]         Reason for Consult: Hypercalcemia  Requesting Physician:  Dr. ARREOLA      History Obtained From:  patient, electronic medical record    History of Present Ilness: 63-year-old white female with history of hypertension, Psoriatic arthritis who was hospitalized in July and discovered to have generalized lymphadenopathy and splenomegaly - a EUS guided periportal lymph node biopsy was done that was negative for lymphoma    She has been followed by Dr. Best and Dr. Fairchild. Her calcium was 10.6 mg in July. She had outpatient labs done by her rheumatologist which revealed elevated calcium of 13.1 and she was asked to come to the ER. Also noted to have creatinine of 1.51 mg potassium of 3.2    She had recently been started on losartan HCTZ on August 15. She does not take any calcium supplementation. She has low-grade fever off and on, denies night sweats, weight loss. She has had some constipation denies difficulty concentrating    Subjective:  In good spirits; NAD;  at bedside    Physical exam:   Constitutional:  VITALS:  /83   Pulse 73   Temp 97.8 °F (36.6 °C) (Oral)   Resp 12   Ht 1.6 m (5' 3\")   Wt 92.4 kg (203 lb 11.3 oz)   SpO2 95%   BMI 36.08 kg/m²     Intake/Output Summary (Last 24 hours) at 9/23/2024 1329  Last data filed at 9/23/2024 1223  Gross per 24 hour   Intake 310 ml   Output --   Net 310 ml       Gen: Alert, awake  Heent:  NCAT  Cardiovascular:  RRR  Respiratory: CTA Bilaterally  Abdomen:  NTND  Ext: trace lower extremity edema    Medications   losartan  50 mg Oral Daily    potassium & sodium phosphates  1 packet Oral BID    sodium chloride flush  5-40 mL IntraVENous 2 times per day    enoxaparin  40 mg SubCUTAneous Daily    sennosides-docusate sodium  1 tablet Oral BID    aspirin  81 mg Oral Daily    atorvastatin  40 mg Oral Nightly    levothyroxine  112 mcg Oral QAM AC     Apremilast  30 mg Oral BID       Data/  CBC:   Lab Results   Component Value Date/Time    WBC 9.6 09/23/2024 04:22 AM    RBC 3.75 09/23/2024 04:22 AM    HGB 11.1 09/23/2024 04:22 AM    HCT 29.0 09/23/2024 07:50 AM    MCV 89.4 09/23/2024 04:22 AM    MCH 29.6 09/23/2024 04:22 AM    MCHC 33.1 09/23/2024 04:22 AM    RDW 13.7 09/23/2024 04:22 AM     09/23/2024 04:22 AM    MPV 8.1 09/23/2024 04:22 AM     BMP:    Lab Results   Component Value Date/Time     09/23/2024 04:28 AM    K 3.2 09/23/2024 04:28 AM    K 3.1 09/20/2024 06:01 PM     09/23/2024 04:28 AM    CO2 22 09/23/2024 04:28 AM    BUN 12 09/23/2024 04:28 AM    CREATININE 1.0 09/23/2024 04:28 AM    CALCIUM 10.8 09/23/2024 04:28 AM    GFRAA >60 02/01/2022 07:32 AM    GFRAA >60 05/20/2013 04:25 PM    LABGLOM 63 09/23/2024 04:28 AM    LABGLOM 63 04/18/2024 08:40 AM    GLUCOSE 89 09/23/2024 04:28 AM     Lab Results   Component Value Date    CALCIUM 10.8 (H) 09/23/2024    PHOS 3.4 09/23/2024         Assessment/Plan     1-hypercalcemia in the setting of diffuse adenopathy concerning for malignancy  - Her PTH is appropriately low at 16.3 ruling out primary hyperparathyroidism   - patient did start hydrochlorothiazide about a month ago and could be contributing but underlying pathology likely the culprit    - Received Zometa - Calcium trending down  - HCTZ stopped   - PTHrp and ACE level pending   - Concern for sarcoidosis     2-lymphadenopathy, splenic lesions status post lymph node biopsy in July 2024 Lymphoma was ruled out r/o Sarcoid     3-history of hypertension controlled     4-history of Psoriatic arthritis on Mobic     5-history of hypothyroidism on replacement therapy    6-JAMEY mild - likely due to volume depletion, hypercalcemia. Resolved    7-Hypokalemia - replace prn

## 2024-09-23 NOTE — PROGRESS NOTES
Patient returned from surgery. Electronically signed by Shirley Beltran RN on 9/23/2024 at 7:21 PM

## 2024-09-23 NOTE — PROGRESS NOTES
Patients mealtray was ordered for them and denies further needs at this time. Electronically signed by Shirley Beltran RN on 9/23/2024 at 6:13 PM

## 2024-09-23 NOTE — PROGRESS NOTES
V2.0    [facility] Progress Note      Name:  Sarah Thapa /Age/Sex: 1960  (63 y.o. female)   MRN & CSN:  [id number] & [id number] Encounter Date/Time: 2024 9:31 AM EDT   Location:  Y4I-8963/3121-01 PCP: Marvel Arceo DO     Attending:Lydia Peters MD       Hospital Day: 4    Assessment and Recommendations   Sarah Thapa is a 63 y.o. female who presents with Hypercalcemia      Plan:     Lymphadenopathy and splenomegaly  - lymphoma suspected with sarcoidosis as a possiblity  - lymph node biopsy on   - bone marrow biopsy on     Hypercalcemia:  - PTH low, so primary hyperparathyroidism unlikely  - holding HCTZ  - s/p Zometa with improvement    Psoriatic arthritis:  - cont Otezla    HTN:  - cont losartan but holding thiazide    HLD:  cont atorvastatin    Diet Diet NPO   DVT Prophylaxis [] Lovenox, []  Heparin, [] SCDs, [] Ambulation,  [] Eliquis, [] Xarelto  [] Coumadin   Code Status Full Code             Personally reviewed Lab Studies and Imaging         Subjective:     Chief Complaint: lymphadenopathy    No pain or other complaints.  Going for both bone marrow and lymph node biopsy today      Review of Systems:      Pertinent positives and negatives discussed in HPI    Objective:     Intake/Output Summary (Last 24 hours) at 2024 0931  Last data filed at 2024 1944  Gross per 24 hour   Intake 550 ml   Output --   Net 550 ml      Vitals:   Vitals:    24 0745 24 0914 24 0915 24 0924   BP: (!) 154/71  (!) 161/88 (!) 150/67   Pulse: 73 86  72   Resp: 15 20  20   Temp: 98.6 °F (37 °C)      TempSrc: Oral      SpO2: 95% 97%  100%   Weight:       Height:             Physical Exam:      General: NAD  Eyes: EOMI  ENT: neck supple  Cardiovascular: Regular rate.  Respiratory: Clear to auscultation  Gastrointestinal: Soft, non tender  Genitourinary: no suprapubic tenderness  Musculoskeletal: No edema  Skin: warm, dry,  axillary lymphadenopathy noted  Neuro:  mammographic evidence for malignancy. BIRADS: BIRADS - CATEGORY 1 Negative, no evidence of malignancy.  Normal interval follow-up is recommended in 12 months. OVERALL ASSESSMENT - NEGATIVE A letter of notification will be sent to the patient regarding the results. The American College of Rad[employer] recommends annual mammograms for women 40 years and older. Performing Facility: [facility] [address] [place], [place] [address] Phone: [phone]       CBC:   Recent Labs     09/20/24  1801 09/22/24  0602 09/23/24  0422   WBC 11.6* 8.5 9.6   HGB 11.6* 10.0* 11.1*    256 247     BMP:    Recent Labs     09/21/24  0712 09/22/24  0602 09/23/24  0428    138 140   K 3.5 3.5 3.2*    107 107   CO2 23 23 22   BUN 18 14 12   CREATININE 1.1 1.0 1.0   GLUCOSE 86 89 89     Hepatic:   Recent Labs     09/20/24  1437 09/20/24  1454 09/20/24  1801   AST  ERROR ROUTING ERROR 22 33   ALT  ERROR ROUTING ERROR 28 31   BILITOT  --  0.5 0.3   ALKPHOS  ERROR ROUTING ERROR 60 78     Lipids:   Lab Results   Component Value Date/Time    CHOL 118 08/15/2024 08:28 AM    HDL 31 08/15/2024 08:28 AM    TRIG 153 08/15/2024 08:28 AM     Hemoglobin A1C:   Lab Results   Component Value Date/Time    LABA1C 4.6 02/26/2020 09:44 AM     TSH:   Lab Results   Component Value Date/Time    TSH 5.94 09/20/2024 06:01 PM     Troponin: No results found for: \"TROPONINT\"  Lactic Acid: No results for input(s): \"LACTA\" in the last 72 hours.  BNP: No results for input(s): \"PROBNP\" in the last 72 hours.  UA:  Lab Results   Component Value Date/Time    NITRU Negative 09/20/2024 06:01 PM    COLORU Yellow 09/20/2024 06:01 PM    PHUR 6.5 09/20/2024 06:01 PM    WBCUA 6 09/20/2024 06:01 PM    RBCUA 0 09/20/2024 06:01 PM    BACTERIA None Seen 09/20/2024 06:01 PM    CLARITYU Clear 09/20/2024 06:01 PM    LEUKOCYTESUR MODERATE 09/20/2024 06:01 PM    UROBILINOGEN 0.2 09/20/2024 06:01 PM    BILIRUBINUR

## 2024-09-23 NOTE — CONSULTS
Gastroenterology Consult Note      Patient: Sarah Thapa  : 1960  Acct#:      Date:  2024    Subjective:       History of Present Illness  Patient is a 63 y.o.  female with PMH of psoriasis and psoriatic arthritis, HTN, hypothyroidism, obesity, who is seen in consult for lymphadenopathy.  The patient had been admitted with hypercalcemia.  She has had some chronic splenomegaly with both thoracic and abdominal lymphadenopathy.  Had prior EUS in 24 with FNA of periportal LAD.   Her biopsy had been negative for lymphoma.  She has been following with Dr. Best.  She does have psoriatic arthritis and has been taking apremilast.  She had prior use of Hyzaar.  She is continuing losartan but had her thiazide diuretic discontinued.  Her calcium was 10.9 on admission.  We are consulted for persistent lymphadenopathy and possible EUS with FNA of these lymph nodes for further diagnostic evaluation.    CT: Lymph Nodes: Multiple enlarged angel hepatis, portacaval and periaortic retroperitoneal lymph nodes are not significantly changed from prior exam. Largest lymph nodes are a 1.5 x 3.1 cm portacaval lymph node (series 7, image 79), 1.2 x 1.6 cm left periaortic lymph node (series 7, image 78), 1.6 x 3.2 cm portacaval lymph node (series 7, image 65) and 1.7 x 1.9 cm angel hepatis lymph node (series 7, image 56).  No definite new or enlarging lymph nodes. Additional unchanged borderline enlarged lymph nodes in the splenic hilum measuring up to 0.9 cm (series 7, image 59).    Past Medical History:   Diagnosis Date    Arthralgia of hand, right     Chronic seasonal allergic rhinitis due to pollen     Edema of both legs     Essential hypertension     Hyperlipidemia, mixed     past hx BORDERLINE    Hypothyroidism (acquired)     Non morbid obesity due to excess calories     Psoriasis (a type of skin inflammation)     Psoriatic arthritis (HCC)       Past Surgical History:   Procedure Laterality

## 2024-09-23 NOTE — PRE SEDATION
Sedation Pre-Procedure Note    Patient Name: Sarah Thapa   YOB: 1960  Room/Bed: P1P-7622/3121-01  Medical Record Number: [id number]  Date: 9/23/2024   Time: 9:08 AM       Indication:  possible lymphoma    Consent: I have discussed with the patient and/or the patient representative the indication, alternatives, and the possible risks and/or complications of the planned procedure and the anesthesia methods. The patient and/or patient representative appear to understand and agree to proceed.    Vital Signs:   Vitals:    09/23/24 0745   BP: (!) 154/71   Pulse: 73   Resp: 15   Temp: 98.6 °F (37 °C)   SpO2: 95%       Past Medical History:   has a past medical history of Arthralgia of hand, right, Chronic seasonal allergic rhinitis due to pollen, Edema of both legs, Essential hypertension, Hyperlipidemia, mixed, Hypothyroidism (acquired), Non morbid obesity due to excess calories, Psoriasis (a type of skin inflammation), and Psoriatic arthritis (HCC).    Past Surgical History:   has a past surgical history that includes Sinus endoscopy (1990); Hysterectomy (1987); shoulder surgery; shoulder surgery; Foot surgery (Left); Toe Surgery (Right, 07/06/2010); [place] tooth extraction; Cholecystectomy, laparoscopic (10/12/2016); bronchoscopy (06/05/2017); Wrist surgery (Right); Heel spur surgery (Left, 02/02/2021); Achilles tendon surgery (Left, 2/2/2021); and Upper gastrointestinal endoscopy (N/A, 7/9/2024).    Medications:   Scheduled Meds:    losartan  50 mg Oral Daily    potassium & sodium phosphates  1 packet Oral BID    sodium chloride flush  5-40 mL IntraVENous 2 times per day    enoxaparin  40 mg SubCUTAneous Daily    sennosides-docusate sodium  1 tablet Oral BID    aspirin  81 mg Oral Daily    atorvastatin  40 mg Oral Nightly    levothyroxine  112 mcg Oral QAM AC    Apremilast  30 mg Oral BID     Continuous Infusions:    sodium chloride       PRN Meds: magnesium hydroxide, sodium chloride flush,

## 2024-09-23 NOTE — PROGRESS NOTES
Patient to pre-op w/ hospital transport via bed. IV fluids disconnected at this time for ease of transport. Patient denies needs prior to departure from unit. Spouse leaving hospital at this time, states he will return after procedure.

## 2024-09-24 VITALS
RESPIRATION RATE: 17 BRPM | TEMPERATURE: 97.8 F | SYSTOLIC BLOOD PRESSURE: 138 MMHG | BODY MASS INDEX: 36.17 KG/M2 | WEIGHT: 204.15 LBS | HEART RATE: 59 BPM | HEIGHT: 63 IN | OXYGEN SATURATION: 94 % | DIASTOLIC BLOOD PRESSURE: 68 MMHG

## 2024-09-24 LAB
ALBUMIN SERPL-MCNC: 3.4 G/DL (ref 3.4–5)
ANION GAP SERPL CALCULATED.3IONS-SCNC: 10 MMOL/L (ref 3–16)
BUN SERPL-MCNC: 16 MG/DL (ref 7–20)
CALCIUM SERPL-MCNC: 10.7 MG/DL (ref 8.3–10.6)
CHLORIDE SERPL-SCNC: 108 MMOL/L (ref 99–110)
CO2 SERPL-SCNC: 22 MMOL/L (ref 21–32)
CREAT SERPL-MCNC: 0.9 MG/DL (ref 0.6–1.2)
GFR SERPLBLD CREATININE-BSD FMLA CKD-EPI: 71 ML/MIN/{1.73_M2}
GLUCOSE SERPL-MCNC: 133 MG/DL (ref 70–99)
MAGNESIUM SERPL-MCNC: 1.76 MG/DL (ref 1.8–2.4)
PHOSPHATE SERPL-MCNC: 2.8 MG/DL (ref 2.5–4.9)
POTASSIUM SERPL-SCNC: 3.8 MMOL/L (ref 3.5–5.1)
SODIUM SERPL-SCNC: 140 MMOL/L (ref 136–145)

## 2024-09-24 PROCEDURE — 94760 N-INVAS EAR/PLS OXIMETRY 1: CPT

## 2024-09-24 PROCEDURE — 99024 POSTOP FOLLOW-UP VISIT: CPT | Performed by: PHYSICIAN ASSISTANT

## 2024-09-24 PROCEDURE — 36415 COLL VENOUS BLD VENIPUNCTURE: CPT

## 2024-09-24 PROCEDURE — 6370000000 HC RX 637 (ALT 250 FOR IP): Performed by: INTERNAL MEDICINE

## 2024-09-24 PROCEDURE — 99222 1ST HOSP IP/OBS MODERATE 55: CPT | Performed by: INTERNAL MEDICINE

## 2024-09-24 PROCEDURE — APPNB15 APP NON BILLABLE TIME 0-15 MINS: Performed by: PHYSICIAN ASSISTANT

## 2024-09-24 PROCEDURE — 83735 ASSAY OF MAGNESIUM: CPT

## 2024-09-24 PROCEDURE — APPSS15 APP SPLIT SHARED TIME 0-15 MINUTES: Performed by: PHYSICIAN ASSISTANT

## 2024-09-24 PROCEDURE — 2580000003 HC RX 258: Performed by: INTERNAL MEDICINE

## 2024-09-24 PROCEDURE — 80069 RENAL FUNCTION PANEL: CPT

## 2024-09-24 RX ORDER — LOSARTAN POTASSIUM 50 MG/1
50 TABLET ORAL DAILY
Qty: 30 TABLET | Refills: 0 | Status: SHIPPED | OUTPATIENT
Start: 2024-09-25

## 2024-09-24 RX ORDER — FUROSEMIDE 20 MG
20 TABLET ORAL DAILY PRN
Qty: 60 TABLET | Refills: 3 | Status: SHIPPED | OUTPATIENT
Start: 2024-09-24

## 2024-09-24 RX ADMIN — LEVOTHYROXINE SODIUM 112 MCG: 0.11 TABLET ORAL at 05:46

## 2024-09-24 RX ADMIN — ASPIRIN 81 MG: 81 TABLET, COATED ORAL at 09:40

## 2024-09-24 RX ADMIN — SENNOSIDES AND DOCUSATE SODIUM 1 TABLET: 50; 8.6 TABLET ORAL at 09:41

## 2024-09-24 RX ADMIN — SODIUM CHLORIDE, PRESERVATIVE FREE 10 ML: 5 INJECTION INTRAVENOUS at 09:43

## 2024-09-24 RX ADMIN — LOSARTAN POTASSIUM 50 MG: 50 TABLET, FILM COATED ORAL at 09:40

## 2024-09-24 ASSESSMENT — PAIN SCALES - GENERAL: PAINLEVEL_OUTOF10: 0

## 2024-09-24 NOTE — PLAN OF CARE
Problem: Discharge Planning  Goal: Discharge to home or other facility with appropriate resources  9/23/2024 2252 by Marie Beltran, RN  Outcome: Progressing  Flowsheets (Taken 9/23/2024 2252)  Discharge to home or other facility with appropriate resources:   Identify barriers to discharge with patient and caregiver   Identify discharge learning needs (meds, wound care, etc)   Arrange for needed discharge resources and transportation as appropriate  9/23/2024 2252 by Marie Beltran, RN  Outcome: Progressing  Flowsheets (Taken 9/23/2024 2252)  Discharge to home or other facility with appropriate resources:   Identify barriers to discharge with patient and caregiver   Identify discharge learning needs (meds, wound care, etc)   Arrange for needed discharge resources and transportation as appropriate  9/23/2024 1153 by Shirley Beltran RN  Flowsheets (Taken 9/23/2024 1153)  Discharge to home or other facility with appropriate resources:   Identify barriers to discharge with patient and caregiver   Identify discharge learning needs (meds, wound care, etc)   Refer to discharge planning if patient needs post-hospital services based on physician order or complex needs related to functional status, cognitive ability or social support system   Arrange for needed discharge resources and transportation as appropriate     Problem: Safety - Adult  Goal: Free from fall injury  9/23/2024 2252 by Marie Beltran, RN  Outcome: Progressing  Flowsheets (Taken 9/23/2024 2252)  Free From Fall Injury:   Instruct family/caregiver on patient safety   Based on caregiver fall risk screen, instruct family/caregiver to ask for assistance with transferring infant if caregiver noted to have fall risk factors  9/23/2024 2252 by Marie Beltran, RN  Outcome: Progressing  Flowsheets (Taken 9/23/2024 2252)  Free From Fall Injury:   Instruct family/caregiver on patient safety   Based on caregiver fall risk screen, instruct

## 2024-09-24 NOTE — PROGRESS NOTES
[facility]  Phone: [phone]  Fax: [phone]  [facility]         Reason for Consult: Hypercalcemia  Requesting Physician:  Dr. ARREOLA      History Obtained From:  patient, electronic medical record    History of Present Ilness: 63-year-old white female with history of hypertension, Psoriatic arthritis who was hospitalized in July and discovered to have generalized lymphadenopathy and splenomegaly - a EUS guided periportal lymph node biopsy was done that was negative for lymphoma    She has been followed by Dr. Best and Dr. Fairchild. Her calcium was 10.6 mg in July. She had outpatient labs done by her rheumatologist which revealed elevated calcium of 13.1 and she was asked to come to the ER. Also noted to have creatinine of 1.51 mg potassium of 3.2    She had recently been started on losartan HCTZ on August 15. She does not take any calcium supplementation. She has low-grade fever off and on, denies night sweats, weight loss. She has had some constipation denies difficulty concentrating    Subjective:  In good spirits; NAD;  at bedside    Physical exam:   Constitutional:  VITALS:  /68   Pulse 59   Temp 97.8 °F (36.6 °C) (Oral)   Resp 17   Ht 1.6 m (5' 3\")   Wt 92.6 kg (204 lb 2.3 oz)   SpO2 94%   BMI 36.16 kg/m²     Intake/Output Summary (Last 24 hours) at 9/24/2024 1425  Last data filed at 9/24/2024 1154  Gross per 24 hour   Intake 1532 ml   Output --   Net 1532 ml       Gen: Alert, awake  Heent:  NCAT  Cardiovascular:  RRR  Respiratory: CTA Bilaterally  Abdomen:  NTND  Ext: trace lower extremity edema    Medications   losartan  50 mg Oral Daily    sodium chloride flush  5-40 mL IntraVENous 2 times per day    enoxaparin  40 mg SubCUTAneous Daily    sennosides-docusate sodium  1 tablet Oral BID    aspirin  81 mg Oral Daily    atorvastatin  40 mg Oral Nightly    levothyroxine  112 mcg Oral QAM AC    Apremilast  30 mg Oral BID       Data/  CBC:   Lab Results

## 2024-09-24 NOTE — PROGRESS NOTES
Patient discharge paperwork was reviewed with patient and patient verbalized understanding.  Patient discharged with belongings.  IV was removed without complication. Electronically signed by Shirley Beltran RN on 9/24/2024 at 2:58 PM

## 2024-09-24 NOTE — PROGRESS NOTES
ONCOLOGY HEMATOLOGY CARE PROGRESS NOTE      SUBJECTIVE:  Patient is doing well.  She denies any shortness of breath or chest pain    ROS:     Constitutional:  No weight loss, No fever, No chills, No night sweats.  Energy level good.  Eyes:  No impairment or change in vision  ENT / Mouth:  No pain, abnormal ulceration, bleeding, nasal drip or change in voice or hearing  Cardiovascular:  No chest pain, palpitations, new edema, or calf discomfort  Respiratory:  No pain, hemoptysis, change to breathing  Breast:  No pain, discharge, change in appearance or texture  Gastrointestinal:  No pain, cramping, jaundice, change to eating and bowel habits  Urinary:  No pain, bleeding or change in continence  Genitalia: No pain, bleeding or discharge  Musculoskeletal:  No redness, pain, edema or weakness  Skin:  No pruritus, rash, change to nodules or lesions  Neurologic:  No discomfort, change in mental status, speech, sensory or motor activity  Psychiatric:  No change in concentration or change to affect or mood  Endocrine:  No hot flashes, increased thirst, or change to urine production  Hematologic: No petechiae, ecchymosis or bleeding  Lymphatic:  No lymphadenopathy or lymphedema  Allergy / Immunologic:  No eczema, hives, frequent or recurrent infections    OBJECTIVE        Physical    VITALS:  Patient Vitals for the past 24 hrs:   BP Temp Temp src Pulse Resp SpO2 Weight   09/24/24 0846 -- -- -- -- -- 94 % --   09/24/24 0709 138/68 97.8 °F (36.6 °C) Oral 59 17 95 % --   09/24/24 0605 -- -- -- -- -- -- 92.6 kg (204 lb 2.3 oz)   09/24/24 0451 121/77 97.6 °F (36.4 °C) -- 70 16 93 % --   09/24/24 0045 109/66 97.5 °F (36.4 °C) -- 57 16 93 % --   09/23/24 2203 -- 98.1 °F (36.7 °C) Oral -- -- -- --   09/23/24 1938 (!) 142/75 97.9 °F (36.6 °C) Oral 78 16 93 % --   09/23/24 1751 (!) 146/62 98.2 °F (36.8 °C) Oral 87 14 93 % --   09/23/24 1740 (!) 150/75 -- -- 84 16 94 % --   09/23/24 1736 (!) 142/70  (acquired)    Non morbid obesity due to excess calories    Psoriasis (a type of skin inflammation)    Calculus of gallbladder with acute cholecystitis without obstruction    Hyperlipidemia, mixed    Chronic seasonal allergic rhinitis due to pollen    De Quervain's tenosynovitis, right    Carpal tunnel syndrome, right    Chest pain    Lymphadenopathy    Essential hypertension    Arthralgia of hand, right    Splenomegaly    Lymphadenopathy, abdominal    Hypercalcemia    JAMEY (acute kidney injury) (HCC)    Electrolyte abnormality       ASSESSMENT AND PLAN:  1.  Hypercalcemia\lymphadenopathy\splenic lesions.  Her ACE level was high suggesting this is sarcoidosis.  Her hypercalcemia has improved with Zometa.  I spoke with Dr. Mitchell yesterday.  He does treat sarcoidosis and will see her later today for an appointment.  Her lymph node biopsy and bone marrow biopsy are pending.  She has a hospital follow-up with me on Thursday.    OK to discharge,            ONCOLOGIC DISPOSITION:      Pankaj Best Jr, MD  Please contact through [employer]

## 2024-09-24 NOTE — PROGRESS NOTES
General and Vascular Surgery                                                           Daily Progress Note                                                             Rafael Mitchell PA-C    Pt Name: Sarah Thapa  Medical Record Number: [id number]  Date of Birth 1960   Today's Date: 9/24/2024    ASSESSMENT/PLAN  S/P(9/23/24): LEFT AXILLARY LYMPH NODE EXCISIONAL BIOPSY   Pain controlled  Incision site looks good. Healing well. Pain controlled  Denies any nausea or emesis  Tolerating diet  OK to D/C home from a general surgery standpoint.      EDUCATION  Patient educated about their illness/diagnosis, stated above, and all questions answered. We discussed the importance of nutrition, medications they are taking, and healthy lifestyle.  SUBJECTIVE  Sarah has improved from yesterday. Pain is well controlled. She has no nausea and no vomiting.  She is tolerating regular diet. Current activity is ad don    OBJECTIVE  VITALS:  height is 1.6 m (5' 3\") and weight is 92.6 kg (204 lb 2.3 oz). Her oral temperature is 97.8 °F (36.6 °C). Her blood pressure is 138/68 and her pulse is 59. Her respiration is 17 and oxygen saturation is 94%. VITALS:  /68   Pulse 59   Temp 97.8 °F (36.6 °C) (Oral)   Resp 17   Ht 1.6 m (5' 3\")   Wt 92.6 kg (204 lb 2.3 oz)   SpO2 94%   BMI 36.16 kg/m²   GENERAL: alert, no distress  LUNGS: clear to ausculation, without wheezes, rales or rhonci  HEART: normal rate and regular rhythm  ABDOMEN: soft, non-tender, non-distended  EXTREMITY: +pain left axillary incision site. Site healing well.  I/O last 3 completed shifts:  In: 1500 [P.O.:1040; I.V.:410; IV Piggyback:50]  Out: -   I/O this shift:  In: 120 [P.O.:120]  Out: -     LABS  Recent Labs     09/23/24  0422 09/23/24  0428 09/23/24  0749 09/23/24  0750 09/24/24  0412   WBC 9.6  --   --   --   --    HGB 11.1*  --   --   --   --    HCT 33.5*  --   --  29.0*  --    PLT  247  --   --   --   --    NA  --    < >  --   --  140   K  --    < >  --   --  3.8   CL  --    < >  --   --  108   CO2  --    < >  --   --  22   BUN  --    < >  --   --  16   CREATININE  --    < >  --   --  0.9   MG  --    < >  --   --  1.76*   PHOS  --    < >  --   --  2.8   CALCIUM  --    < >  --   --  10.7*   INR  --   --  1.08  --   --     < > = values in this interval not displayed.   CBC:   Lab Results   Component Value Date/Time    WBC 9.6 09/23/2024 04:22 AM    RBC 3.75 09/23/2024 04:22 AM    HGB 11.1 09/23/2024 04:22 AM    HCT 29.0 09/23/2024 07:50 AM    MCV 89.4 09/23/2024 04:22 AM    MCH 29.6 09/23/2024 04:22 AM    MCHC 33.1 09/23/2024 04:22 AM    RDW 13.7 09/23/2024 04:22 AM     09/23/2024 04:22 AM    MPV 8.1 09/23/2024 04:22 AM     CMP:    Lab Results   Component Value Date/Time     09/24/2024 04:12 AM    K 3.8 09/24/2024 04:12 AM    K 3.1 09/20/2024 06:01 PM     09/24/2024 04:12 AM    CO2 22 09/24/2024 04:12 AM    BUN 16 09/24/2024 04:12 AM    CREATININE 0.9 09/24/2024 04:12 AM    GFRAA >60 02/01/2022 07:32 AM    GFRAA >60 05/20/2013 04:25 PM    AGRATIO 1.0 09/20/2024 06:01 PM    LABGLOM 71 09/24/2024 04:12 AM    LABGLOM 63 04/18/2024 08:40 AM    GLUCOSE 133 09/24/2024 04:12 AM    CALCIUM 10.7 09/24/2024 04:12 AM    BILITOT 0.3 09/20/2024 06:01 PM    ALKPHOS 78 09/20/2024 06:01 PM    AST 33 09/20/2024 06:01 PM    ALT 31 09/20/2024 06:01 PM         Rafael Mitchell PA-C   Electronically signed 9/24/2024 at 11:22 AM

## 2024-09-24 NOTE — PROGRESS NOTES
Patient tolerated morning medication pass.  Refused lovenox injection as she has been moving around in the room independently.  Denies further needs at this time. Electronically signed by Shirley Beltran RN on 9/24/2024 at 10:27 AM

## 2024-09-24 NOTE — PROGRESS NOTES
CLINICAL PHARMACY NOTE: MEDS TO BEDS    Total # of Prescriptions Filled: 2   The following medications were delivered to the patient:  Current Discharge Medication List        START taking these medications    Details   furosemide (LASIX) 20 MG tablet Take 1 tablet by mouth daily as needed (leg swelling)  Qty: 60 tablet, Refills: 3      losartan (COZAAR) 50 MG tablet Take 1 tablet by mouth daily  Qty: 30 tablet, Refills: 0               Additional Documentation: Tubed tulio Holguin

## 2024-09-24 NOTE — PLAN OF CARE
Problem: Discharge Planning  Goal: Discharge to home or other facility with appropriate resources  9/24/2024 0923 by Shirley Beltran RN  Flowsheets (Taken 9/24/2024 0923)  Discharge to home or other facility with appropriate resources:   Identify barriers to discharge with patient and caregiver   Identify discharge learning needs (meds, wound care, etc)   Refer to discharge planning if patient needs post-hospital services based on physician order or complex needs related to functional status, cognitive ability or social support system   Arrange for needed discharge resources and transportation as appropriate  9/23/2024 2252 by Marie Beltran, RN  Outcome: Progressing  Flowsheets (Taken 9/23/2024 2252)  Discharge to home or other facility with appropriate resources:   Identify barriers to discharge with patient and caregiver   Identify discharge learning needs (meds, wound care, etc)   Arrange for needed discharge resources and transportation as appropriate  9/23/2024 2252 by Marie Beltran, RN  Outcome: Progressing  Flowsheets (Taken 9/23/2024 2252)  Discharge to home or other facility with appropriate resources:   Identify barriers to discharge with patient and caregiver   Identify discharge learning needs (meds, wound care, etc)   Arrange for needed discharge resources and transportation as appropriate     Problem: Safety - Adult  Goal: Free from fall injury  9/24/2024 0923 by Shirley Beltran RN  Flowsheets (Taken 9/24/2024 0923)  Free From Fall Injury: Instruct family/caregiver on patient safety  9/23/2024 2252 by Marie Beltran, RN  Outcome: Progressing  Flowsheets (Taken 9/23/2024 2252)  Free From Fall Injury:   Instruct family/caregiver on patient safety   Based on caregiver fall risk screen, instruct family/caregiver to ask for assistance with transferring infant if caregiver noted to have fall risk factors  9/23/2024 2252 by Marie Beltran, RN  Outcome: Progressing  Flowsheets (Taken  assessment  9/23/2024 2252 by Marie Beltran, RN  Outcome: Progressing  Flowsheets (Taken 9/23/2024 2252)  Absence of Physical Injury: Implement safety measures based on patient assessment

## 2024-09-25 ENCOUNTER — TELEPHONE (OUTPATIENT)
Dept: FAMILY MEDICINE CLINIC | Age: 64
End: 2024-09-25

## 2024-09-25 PROBLEM — E87.6 HYPOKALEMIA: Status: ACTIVE | Noted: 2024-09-25

## 2024-09-25 PROBLEM — D86.2 SARCOIDOSIS OF LUNG WITH SARCOIDOSIS OF LYMPH NODES (HCC): Status: ACTIVE | Noted: 2024-09-25

## 2024-09-25 PROBLEM — E66.01 MORBID OBESITY DUE TO EXCESS CALORIES: Status: ACTIVE | Noted: 2024-09-25

## 2024-09-26 LAB — PTH RELATED PROT SERPL-SCNC: 2.2 PMOL/L (ref 0–3.4)

## 2024-09-29 NOTE — DISCHARGE SUMMARY
last 72 hours.    Invalid input(s): \"ALB\"  Lipids:   Lab Results   Component Value Date/Time    CHOL 118 08/15/2024 08:28 AM    HDL 31 08/15/2024 08:28 AM    TRIG 153 08/15/2024 08:28 AM     Hemoglobin A1C:   Lab Results   Component Value Date/Time    LABA1C 4.6 02/26/2020 09:44 AM     TSH:   Lab Results   Component Value Date/Time    TSH 5.94 09/20/2024 06:01 PM     Troponin: No results found for: \"TROPONINT\"  Lactic Acid: No results for input(s): \"LACTA\" in the last 72 hours.  BNP: No results for input(s): \"PROBNP\" in the last 72 hours.  UA:  Lab Results   Component Value Date/Time    NITRU Negative 09/20/2024 06:01 PM    COLORU Yellow 09/20/2024 06:01 PM    PHUR 6.5 09/20/2024 06:01 PM    WBCUA 6 09/20/2024 06:01 PM    RBCUA 0 09/20/2024 06:01 PM    BACTERIA None Seen 09/20/2024 06:01 PM    CLARITYU Clear 09/20/2024 06:01 PM    LEUKOCYTESUR MODERATE 09/20/2024 06:01 PM    UROBILINOGEN 0.2 09/20/2024 06:01 PM    BILIRUBINUR Negative 09/20/2024 06:01 PM    BLOODU Negative 09/20/2024 06:01 PM    GLUCOSEU Negative 09/20/2024 06:01 PM    KETUA Negative 09/20/2024 06:01 PM     Urine Cultures:   Lab Results   Component Value Date/Time    LABURIN No growth at 18 to 36 hours 09/21/2024 06:25 PM     Blood Cultures:   Lab Results   Component Value Date/Time    BC No growth after 5 days of incubation. 05/31/2017 03:21 PM     Lab Results   Component Value Date/Time    BLOODCULT2 No growth after 5 days of incubation. 05/31/2017 03:20 PM     Organism: No results found for: \"ORG\"    Time Spent Discharging patient 38 minutes    Electronically signed by Lydia Peters MD on 9/29/2024 at 7:57 PM

## 2024-09-30 LAB
Lab: NORMAL
REPORT: NORMAL
THIS TEST SENT TO: NORMAL

## 2024-09-30 NOTE — PROGRESS NOTES
Subjective:      Patient ID: Sarah Thapa is a 63 y.o. female.    [facility]    Hospital Follow Up / Hypercalcemia:  Patient was sent to ER by Dr. Mitchell on 9-20-24 due to a calcium level of 13.1.  She was treated with IV fluids and Zometa and admitted.  Her PTH was low, so primary hyperparathyroidism is unlikely.  Her Hyzaar was changed to Losartan 50 mg daily and Lasix 20 mg daily due to the hypercalcemia.  She is suspected of having sarcoidosis due to lymphadenopathy and splenomegaly.  Lymph node and bone marrow biopsy confirmed sarcoidosis.   Her calcium improved to 10.7 and she was discharged to home on 9-24-24 and will follow up with Heme / Onc as well as Rheumatology.   Her calcium level was 10.5 on 9-26-24.   She has since started back on the Hyzaar and potassium supplement.  She is now on a Prednisone taper over the next 3 weeks and is also on methotrexate.        Review of Systems   Constitutional:  Negative for chills and fever.     /80   Ht 1.6 m (5' 3\")   Wt 92.1 kg (203 lb)   BMI 35.96 kg/m²    Objective:   Physical Exam  Constitutional:       General: She is not in acute distress.     Appearance: She is well-developed.   HENT:      Head: Normocephalic.      Right Ear: External ear normal.      Left Ear: External ear normal.      Mouth/Throat:      Pharynx: No oropharyngeal exudate.   Neck:      Thyroid: No thyromegaly.      Vascular: No JVD.   Cardiovascular:      Rate and Rhythm: Normal rate and regular rhythm.      Heart sounds: Normal heart sounds. No murmur heard.  Pulmonary:      Effort: Pulmonary effort is normal.      Breath sounds: Normal breath sounds. No wheezing or rales.   Musculoskeletal:      Right lower leg: Edema present.      Left lower leg: Edema present.      Comments: 1+ pretibial edema bilaterally.     Lymphadenopathy:      Cervical: No cervical adenopathy.   Neurological:      Mental Status: She is alert and oriented to person, place, and time.         Assessment:

## 2024-10-02 ENCOUNTER — OFFICE VISIT (OUTPATIENT)
Dept: FAMILY MEDICINE CLINIC | Age: 64
End: 2024-10-02

## 2024-10-02 VITALS
DIASTOLIC BLOOD PRESSURE: 80 MMHG | HEIGHT: 63 IN | SYSTOLIC BLOOD PRESSURE: 134 MMHG | BODY MASS INDEX: 35.97 KG/M2 | WEIGHT: 203 LBS

## 2024-10-02 DIAGNOSIS — E83.52 HYPERCALCEMIA: Primary | ICD-10-CM

## 2024-10-02 DIAGNOSIS — Z23 NEED FOR INFLUENZA VACCINATION: ICD-10-CM

## 2024-10-02 DIAGNOSIS — Z09 HOSPITAL DISCHARGE FOLLOW-UP: ICD-10-CM

## 2024-10-02 DIAGNOSIS — D86.9 SARCOIDOSIS: ICD-10-CM

## 2024-10-02 RX ORDER — FOLIC ACID 1 MG/1
1 TABLET ORAL DAILY
COMMUNITY

## 2024-10-02 RX ORDER — ATORVASTATIN CALCIUM 40 MG/1
40 TABLET, FILM COATED ORAL NIGHTLY
Qty: 30 TABLET | Refills: 3 | Status: SHIPPED | OUTPATIENT
Start: 2024-10-02

## 2024-10-02 RX ORDER — METHOTREXATE 2.5 MG/1
8 TABLET ORAL WEEKLY
COMMUNITY

## 2024-10-02 RX ORDER — LEVOTHYROXINE SODIUM 112 UG/1
112 TABLET ORAL DAILY
Qty: 90 TABLET | OUTPATIENT
Start: 2024-10-02

## 2024-10-02 RX ORDER — LEVOTHYROXINE SODIUM 112 UG/1
112 TABLET ORAL DAILY
Qty: 30 TABLET | Refills: 3 | Status: SHIPPED | OUTPATIENT
Start: 2024-10-02

## 2024-10-02 RX ORDER — LOSARTAN POTASSIUM AND HYDROCHLOROTHIAZIDE 25; 100 MG/1; MG/1
1 TABLET ORAL DAILY
COMMUNITY
End: 2024-10-06 | Stop reason: SDUPTHER

## 2024-10-02 RX ORDER — ATORVASTATIN CALCIUM 40 MG/1
40 TABLET, FILM COATED ORAL NIGHTLY
Qty: 90 TABLET | OUTPATIENT
Start: 2024-10-02

## 2024-10-02 NOTE — TELEPHONE ENCOUNTER
Last office visit 10/2/2024       Next office visit scheduled Visit date not found    Requested Prescriptions     Pending Prescriptions Disp Refills    levothyroxine (SYNTHROID) 112 MCG tablet [Pharmacy Med Name: LEVOTHYROXINE 0.112MG (112MCG) TABS] 90 tablet      Sig: Take 1 tablet by mouth daily    atorvastatin (LIPITOR) 40 MG tablet [Pharmacy Med Name: ATORVASTATIN 40MG TABLETS] 90 tablet      Sig: TAKE 1 TABLET BY MOUTH EVERY NIGHT

## 2024-10-03 NOTE — TELEPHONE ENCOUNTER
This team does not recognize this workflow.  You have to send PA requests VIA telephone encounter, or .  If you have an questions don't hesitate to reach out.  The encounter cannot be attached to a prescription refill, or request.  That makes it show up in our inbox as an RX REQUEST.  That is not monitored and must be resent.

## 2024-10-06 RX ORDER — LOSARTAN POTASSIUM AND HYDROCHLOROTHIAZIDE 25; 100 MG/1; MG/1
1 TABLET ORAL DAILY
Qty: 90 TABLET | Refills: 1 | Status: SHIPPED | OUTPATIENT
Start: 2024-10-06

## 2024-10-08 ENCOUNTER — OFFICE VISIT (OUTPATIENT)
Dept: SURGERY | Age: 64
End: 2024-10-08

## 2024-10-08 VITALS
BODY MASS INDEX: 35.43 KG/M2 | DIASTOLIC BLOOD PRESSURE: 67 MMHG | HEART RATE: 67 BPM | WEIGHT: 200 LBS | SYSTOLIC BLOOD PRESSURE: 108 MMHG

## 2024-10-08 DIAGNOSIS — R59.1 LYMPHADENOPATHY: Primary | ICD-10-CM

## 2024-10-08 PROCEDURE — 99024 POSTOP FOLLOW-UP VISIT: CPT | Performed by: STUDENT IN AN ORGANIZED HEALTH CARE EDUCATION/TRAINING PROGRAM

## 2024-10-08 NOTE — PROGRESS NOTES
GENERAL SURGERY  Post-Op Office Visit    Patient Name: Sarah Thapa  MRN: [id number]  YOB: 1960   Date of Service: 10/8/2024      Chief Complaint   Patient presents with    Post-Op Check     S/P LEFT  AXILLARY LYMPH NODE EXCISIONAL  BIOPSY 9/23       SUBJECTIVE:     Sarah presents for follow up: Post-Op Check (S/P LEFT  AXILLARY LYMPH NODE EXCISIONAL  BIOPSY 9/23)      Pain: yes: Improving, but residual soreness  Narcotic pain medication: No longer taking  Incision(s): healing well, some swelling under the left axillary incision  Nausea/Vomiting: no  Fever/Chills:  no  Bowel function: Normal  Activity: Normal      REVIEW OF SYSTEMS  A comprehensive review of systems was completed and is negative except for any elements noted above.    Past Medical History:   Diagnosis Date    Arthralgia of hand, right     Chronic seasonal allergic rhinitis due to pollen     Edema of both legs     Essential hypertension     Hyperlipidemia, mixed     past hx BORDERLINE    Hypothyroidism (acquired)     Non morbid obesity due to excess calories     Psoriasis (a type of skin inflammation)     Psoriatic arthritis (HCC)     Sarcoidosis 10/2024     Past Surgical History:   Procedure Laterality Date    ACHILLES TENDON SURGERY Left 02/02/2021    EXCISION POSTERIOR CALCANEAL EXOSTOSIS LEFT, DEBRIDEMENT AND REPAIR LEFT ACHILLES performed by Austin Duarte DPM at [facility] OR    AXILLARY SURGERY Left 9/23/2024    LEFT  AXILLARY LYMPH NODE EXCISIONAL  BIOPSY performed by Stefano Dobbs MD at [facility] OR    BRONCHOSCOPY  06/05/2017    CHOLECYSTECTOMY, LAPAROSCOPIC  10/12/2016    with milena / Dr. Archibald.     CT BONE MARROW BIOPSY Left 09/23/2024    Dr. Rafita Liu. 2 samples obtained    CT BONE MARROW BIOPSY  9/23/2024    CT BONE MARROW BIOPSY 9/23/2024 [facility] CT    FOOT SURGERY Left     Left cyst removal    HEEL SPUR SURGERY Left 02/02/2021    Dr. Duarte    HYSTERECTOMY (CERVIX STATUS UNKNOWN)  1987    Partial    SHOULDER

## 2024-10-31 RX ORDER — ATORVASTATIN CALCIUM 40 MG/1
40 TABLET, FILM COATED ORAL DAILY
Qty: 90 TABLET | Refills: 0 | Status: SHIPPED | OUTPATIENT
Start: 2024-10-31

## 2024-10-31 RX ORDER — LEVOTHYROXINE SODIUM 112 UG/1
112 TABLET ORAL DAILY
Qty: 90 TABLET | Refills: 0 | Status: SHIPPED | OUTPATIENT
Start: 2024-10-31 | End: 2025-01-29

## 2024-10-31 RX ORDER — LOSARTAN POTASSIUM AND HYDROCHLOROTHIAZIDE 25; 100 MG/1; MG/1
1 TABLET ORAL DAILY
Qty: 90 TABLET | Refills: 0 | Status: SHIPPED | OUTPATIENT
Start: 2024-10-31

## 2024-10-31 NOTE — TELEPHONE ENCOUNTER
Last office visit 10/2/2024        Next office visit scheduled Visit date not found    Requested Prescriptions     Pending Prescriptions Disp Refills    atorvastatin (LIPITOR) 40 MG tablet [Pharmacy Med Name: ATORVASTATIN TABS 40MG]  0

## 2024-10-31 NOTE — TELEPHONE ENCOUNTER
Last office visit 10/2/2024       Next office visit scheduled Visit date not found    Requested Prescriptions     Pending Prescriptions Disp Refills    atorvastatin (LIPITOR) 40 MG tablet [Pharmacy Med Name: ATORVASTATIN TABS 40MG] 90 tablet 0     Sig: Take 1 tablet by mouth daily    levothyroxine (SYNTHROID) 112 MCG tablet 90 tablet 0     Sig: Take 1 tablet by mouth daily    losartan-hydroCHLOROthiazide (HYZAAR) 100-25 MG per tablet 90 tablet 0     Sig: Take 1 tablet by mouth daily

## 2024-11-15 ENCOUNTER — HOSPITAL ENCOUNTER (OUTPATIENT)
Dept: CT IMAGING | Age: 64
Discharge: HOME | End: 2024-11-15
Attending: INTERNAL MEDICINE
Payer: COMMERCIAL

## 2024-11-15 DIAGNOSIS — R59.1 LYMPHADENOPATHY: ICD-10-CM

## 2024-11-15 PROCEDURE — 74177 CT ABD & PELVIS W/CONTRAST: CPT

## 2024-11-15 PROCEDURE — 6360000004 HC RX CONTRAST MEDICATION: Performed by: INTERNAL MEDICINE

## 2024-11-15 RX ORDER — DIATRIZOATE MEGLUMINE AND DIATRIZOATE SODIUM 660; 100 MG/ML; MG/ML
30 SOLUTION ORAL; RECTAL
Status: COMPLETED | OUTPATIENT
Start: 2024-11-15 | End: 2024-11-15

## 2024-11-15 RX ADMIN — DIATRIZOATE MEGLUMINE AND DIATRIZOATE SODIUM 30 ML: 660; 100 LIQUID ORAL; RECTAL at 09:28

## 2024-11-15 RX ADMIN — IOMEPROL INJECTION 100 ML: 714 INJECTION, SOLUTION INTRAVASCULAR at 10:28

## 2024-12-29 RX ORDER — POTASSIUM CHLORIDE 750 MG/1
10 TABLET, EXTENDED RELEASE ORAL DAILY
Qty: 90 TABLET | Refills: 0 | Status: SHIPPED | OUTPATIENT
Start: 2024-12-29

## 2025-02-20 RX ORDER — LEVOTHYROXINE SODIUM 112 UG/1
112 TABLET ORAL DAILY
Qty: 90 TABLET | Refills: 3 | Status: SHIPPED | OUTPATIENT
Start: 2025-02-20

## 2025-02-20 NOTE — TELEPHONE ENCOUNTER
Last office visit 10/2/2024     Last written      Next office visit scheduled Visit date not found    Requested Prescriptions     Pending Prescriptions Disp Refills    levothyroxine (SYNTHROID) 112 MCG tablet [Pharmacy Med Name: L-THYROXINE (SYNTHROID) TABS 112MCG] 90 tablet 3     Sig: TAKE 1 TABLET DAILY

## 2025-04-25 RX ORDER — POTASSIUM CHLORIDE 750 MG/1
10 TABLET, EXTENDED RELEASE ORAL DAILY
Qty: 30 TABLET | Refills: 0 | Status: SHIPPED | OUTPATIENT
Start: 2025-04-25

## 2025-04-30 NOTE — PROGRESS NOTES
Subjective:      Patient ID: Sarah Thapa is a 64 y.o. female.    COMPLETE PHYSICAL:    Patient is here today for a complete physical. She is feeling well and is fasting for labs.      Hypertension  This is a chronic problem. The current episode started more than 1 month ago. The problem has been gradually improving since onset. The problem is controlled. Associated symptoms include peripheral edema. Pertinent negatives include no chest pain, palpitations or shortness of breath. (She had a GXT in the hospital in July 2024 and it was normal. ) Agents associated with hypertension include thyroid hormones and NSAIDs. Risk factors for coronary artery disease include dyslipidemia, family history, obesity and post-menopausal state. Past treatments include ACE inhibitors and diuretics. The current treatment provides significant improvement. There are no compliance problems.      Hyperlipidemia:  Patient was told to stop her Lipitor about 3 months ago due to elevated LFT's.   Her last lipid check was on 8-15-24 and was at goal for LDL.      Hypothyroidism:  Patient is tolerating and compliant with Synthroid 112 mcg daily.   Her TSH was 5.94 on 9-20-24.      Psoriatic Arthritis:  Patient sees Dr. Mitchell and is on Otezla BID, Methotrexate 15 mg weekly, Folic Acid 1 mg daily.  Her Mobic is on hold due to elevated LFT's.      Allergic Rhinitis:  Patient takes Zyrtec 10 mg daily as needed and feels that the medication works well to control her allergy symptoms.      Sarcoidosis:  Patient was diagnosed with sarcoidosis 10/2024.  She is seeing Dr. Best.  She is on Methotrexate and Folic Acid.      Allergies   Allergen Reactions    Latex Rash    Adhesive Tape Rash    Benzamide Derivatives Other (See Comments)     From nasal spray-instant migraine headache,in contact solution-red,itchy eye     Current Outpatient Medications   Medication Sig Dispense Refill    potassium chloride (KLOR-CON M) 10 MEQ extended release

## 2025-05-02 ASSESSMENT — PATIENT HEALTH QUESTIONNAIRE - PHQ9
SUM OF ALL RESPONSES TO PHQ QUESTIONS 1-9: 0
SUM OF ALL RESPONSES TO PHQ QUESTIONS 1-9: 0
1. LITTLE INTEREST OR PLEASURE IN DOING THINGS: NOT AT ALL
2. FEELING DOWN, DEPRESSED OR HOPELESS: NOT AT ALL
2. FEELING DOWN, DEPRESSED OR HOPELESS: NOT AT ALL
1. LITTLE INTEREST OR PLEASURE IN DOING THINGS: NOT AT ALL
SUM OF ALL RESPONSES TO PHQ QUESTIONS 1-9: 0
SUM OF ALL RESPONSES TO PHQ9 QUESTIONS 1 & 2: 0
SUM OF ALL RESPONSES TO PHQ QUESTIONS 1-9: 0

## 2025-05-05 ENCOUNTER — RESULTS FOLLOW-UP (OUTPATIENT)
Dept: GYNECOLOGY | Age: 65
End: 2025-05-05

## 2025-05-05 ENCOUNTER — OFFICE VISIT (OUTPATIENT)
Dept: FAMILY MEDICINE CLINIC | Age: 65
End: 2025-05-05
Payer: COMMERCIAL

## 2025-05-05 VITALS
DIASTOLIC BLOOD PRESSURE: 78 MMHG | SYSTOLIC BLOOD PRESSURE: 124 MMHG | BODY MASS INDEX: 37.92 KG/M2 | WEIGHT: 214 LBS | HEIGHT: 63 IN

## 2025-05-05 DIAGNOSIS — E78.2 HYPERLIPIDEMIA, MIXED: ICD-10-CM

## 2025-05-05 DIAGNOSIS — I10 ESSENTIAL HYPERTENSION: ICD-10-CM

## 2025-05-05 DIAGNOSIS — Z00.00 WELL ADULT EXAM: Primary | ICD-10-CM

## 2025-05-05 DIAGNOSIS — Z12.11 SCREEN FOR COLON CANCER: ICD-10-CM

## 2025-05-05 DIAGNOSIS — D86.2 SARCOIDOSIS OF LUNG WITH SARCOIDOSIS OF LYMPH NODES: ICD-10-CM

## 2025-05-05 DIAGNOSIS — J30.1 CHRONIC SEASONAL ALLERGIC RHINITIS DUE TO POLLEN: ICD-10-CM

## 2025-05-05 DIAGNOSIS — R79.89 ELEVATED LFTS: Primary | ICD-10-CM

## 2025-05-05 DIAGNOSIS — L40.50 PSORIATIC ARTHRITIS (HCC): ICD-10-CM

## 2025-05-05 DIAGNOSIS — Z00.00 PREVENTATIVE HEALTH CARE: ICD-10-CM

## 2025-05-05 DIAGNOSIS — Z23 NEED FOR PNEUMOCOCCAL 20-VALENT CONJUGATE VACCINATION: ICD-10-CM

## 2025-05-05 DIAGNOSIS — E03.9 HYPOTHYROIDISM (ACQUIRED): ICD-10-CM

## 2025-05-05 LAB
ALT SERPL-CCNC: 189 U/L (ref 10–40)
ANION GAP SERPL CALCULATED.3IONS-SCNC: 11 MMOL/L (ref 3–16)
AST SERPL-CCNC: 95 U/L (ref 15–37)
BUN SERPL-MCNC: 16 MG/DL (ref 7–20)
CALCIUM SERPL-MCNC: 10.4 MG/DL (ref 8.3–10.6)
CHLORIDE SERPL-SCNC: 100 MMOL/L (ref 99–110)
CHOLEST SERPL-MCNC: 245 MG/DL (ref 0–199)
CO2 SERPL-SCNC: 27 MMOL/L (ref 21–32)
CREAT SERPL-MCNC: 0.8 MG/DL (ref 0.6–1.2)
DEPRECATED RDW RBC AUTO: 14.2 % (ref 12.4–15.4)
GFR SERPLBLD CREATININE-BSD FMLA CKD-EPI: 82 ML/MIN/{1.73_M2}
GLUCOSE SERPL-MCNC: 93 MG/DL (ref 70–99)
HCT VFR BLD AUTO: 40.1 % (ref 36–48)
HDLC SERPL-MCNC: 46 MG/DL (ref 40–60)
HGB BLD-MCNC: 13.5 G/DL (ref 12–16)
LDLC SERPL CALC-MCNC: 155 MG/DL
MCH RBC QN AUTO: 33.3 PG (ref 26–34)
MCHC RBC AUTO-ENTMCNC: 33.7 G/DL (ref 31–36)
MCV RBC AUTO: 98.8 FL (ref 80–100)
PLATELET # BLD AUTO: 393 K/UL (ref 135–450)
PMV BLD AUTO: 7.9 FL (ref 5–10.5)
POTASSIUM SERPL-SCNC: 4.3 MMOL/L (ref 3.5–5.1)
RBC # BLD AUTO: 4.06 M/UL (ref 4–5.2)
SODIUM SERPL-SCNC: 138 MMOL/L (ref 136–145)
TRIGL SERPL-MCNC: 219 MG/DL (ref 0–150)
TSH SERPL DL<=0.005 MIU/L-ACNC: 1.94 UIU/ML (ref 0.27–4.2)
VLDLC SERPL CALC-MCNC: 44 MG/DL
WBC # BLD AUTO: 9.5 K/UL (ref 4–11)

## 2025-05-05 PROCEDURE — 3078F DIAST BP <80 MM HG: CPT | Performed by: FAMILY MEDICINE

## 2025-05-05 PROCEDURE — 3074F SYST BP LT 130 MM HG: CPT | Performed by: FAMILY MEDICINE

## 2025-05-05 PROCEDURE — 36415 COLL VENOUS BLD VENIPUNCTURE: CPT | Performed by: FAMILY MEDICINE

## 2025-05-05 PROCEDURE — 99396 PREV VISIT EST AGE 40-64: CPT | Performed by: FAMILY MEDICINE

## 2025-05-05 RX ORDER — LOSARTAN POTASSIUM AND HYDROCHLOROTHIAZIDE 25; 100 MG/1; MG/1
1 TABLET ORAL DAILY
Qty: 90 TABLET | Refills: 1 | Status: SHIPPED | OUTPATIENT
Start: 2025-05-05

## 2025-05-05 RX ORDER — LEVOTHYROXINE SODIUM 112 UG/1
112 TABLET ORAL DAILY
Qty: 90 TABLET | Refills: 1 | Status: SHIPPED | OUTPATIENT
Start: 2025-05-05

## 2025-05-05 RX ORDER — POTASSIUM CHLORIDE 750 MG/1
10 TABLET, EXTENDED RELEASE ORAL DAILY
Qty: 90 TABLET | Refills: 1 | Status: SHIPPED | OUTPATIENT
Start: 2025-05-05

## 2025-05-05 ASSESSMENT — ENCOUNTER SYMPTOMS
RHINORRHEA: 0
NAUSEA: 0
BLOOD IN STOOL: 0
COUGH: 0
WHEEZING: 0
CONSTIPATION: 0
ABDOMINAL PAIN: 0
VOMITING: 0
DIARRHEA: 0

## 2025-05-06 ENCOUNTER — TELEPHONE (OUTPATIENT)
Dept: GYNECOLOGY | Age: 65
End: 2025-05-06

## 2025-05-06 DIAGNOSIS — R79.89 ELEVATED LFTS: ICD-10-CM

## 2025-05-06 LAB
HAV IGM SERPL QL IA: NORMAL
HBV CORE IGM SERPL QL IA: NORMAL
HBV SURFACE AG SERPL QL IA: NORMAL
HCV AB SERPL QL IA: NORMAL

## 2025-05-06 NOTE — TELEPHONE ENCOUNTER
Please oscar the lab and have them add an acute hepatitis panel to the blood from yesterday.  I have placed the order.

## 2025-05-16 LAB — NONINV COLON CA DNA+OCC BLD SCRN STL QL: NEGATIVE

## 2025-05-28 LAB
INR PPP: 0.93 (ref 0.85–1.15)
PROTHROMBIN TIME: 12.6 SEC (ref 11.9–14.9)

## 2025-05-29 LAB
ALBUMIN SERPL-MCNC: 4.1 G/DL (ref 3.4–5)
ALP SERPL-CCNC: 83 U/L (ref 40–129)
ALT SERPL-CCNC: 60 U/L (ref 10–40)
AST SERPL-CCNC: 27 U/L (ref 15–37)
BILIRUB DIRECT SERPL-MCNC: <0.1 MG/DL (ref 0–0.3)
BILIRUB INDIRECT SERPL-MCNC: 0.2 MG/DL (ref 0–1)
BILIRUB SERPL-MCNC: 0.3 MG/DL (ref 0–1)
FERRITIN SERPL IA-MCNC: 154 NG/ML (ref 15–150)
IRON SATN MFR SERPL: 21 % (ref 15–50)
IRON SERPL-MCNC: 59 UG/DL (ref 37–145)
PROT SERPL-MCNC: 6.5 G/DL (ref 6.4–8.2)
TIBC SERPL-MCNC: 280 UG/DL (ref 260–445)

## 2025-05-30 LAB
CERULOPLASMIN SERPL-MCNC: 25 MG/DL (ref 16–45)
LKM-1 IGG SER IA-ACNC: 0.9 U (ref 0–24.9)

## 2025-05-31 LAB
A1AT PHENOTYP SERPL-IMP: NORMAL
A1AT SERPL-MCNC: 153 MG/DL (ref 90–200)
SMA IGG SER-ACNC: 5 UNITS (ref 0–19)

## 2025-06-02 LAB — MITOCHONDRIA M2 AB SER IA-ACNC: 0.8 U/ML (ref 0–4)

## 2025-06-04 LAB
Lab: NORMAL
REPORT: NORMAL
THIS TEST SENT TO: NORMAL

## 2025-07-21 ENCOUNTER — PATIENT MESSAGE (OUTPATIENT)
Dept: FAMILY MEDICINE CLINIC | Age: 65
End: 2025-07-21

## 2025-08-01 ENCOUNTER — TELEPHONE (OUTPATIENT)
Dept: FAMILY MEDICINE CLINIC | Age: 65
End: 2025-08-01

## 2025-08-25 ENCOUNTER — PATIENT MESSAGE (OUTPATIENT)
Dept: FAMILY MEDICINE CLINIC | Age: 65
End: 2025-08-25

## 2025-08-25 RX ORDER — LEVOTHYROXINE SODIUM 112 UG/1
112 TABLET ORAL DAILY
Qty: 90 TABLET | Refills: 0 | Status: SHIPPED | OUTPATIENT
Start: 2025-08-25

## 2025-08-25 RX ORDER — LOSARTAN POTASSIUM AND HYDROCHLOROTHIAZIDE 25; 100 MG/1; MG/1
1 TABLET ORAL DAILY
Qty: 90 TABLET | Refills: 0 | Status: SHIPPED | OUTPATIENT
Start: 2025-08-25

## 2025-08-25 RX ORDER — POTASSIUM CHLORIDE 750 MG/1
10 TABLET, EXTENDED RELEASE ORAL DAILY
Qty: 90 TABLET | Refills: 0 | Status: SHIPPED | OUTPATIENT
Start: 2025-08-25

## (undated) DEVICE — GARMENT,MEDLINE,DVT,INT,CALF,MED, GEN2: Brand: MEDLINE

## (undated) DEVICE — ENDOSCOPY KIT: Brand: MEDLINE INDUSTRIES, INC.

## (undated) DEVICE — SYRINGE MED 10ML LUERLOCK TIP W/O SFTY DISP

## (undated) DEVICE — COVER LT HNDL BLU PLAS

## (undated) DEVICE — PILLOW POS AD L7IN R FOAM HD REST INTUB SLOT DISP

## (undated) DEVICE — CURITY STRETCH BANDAGE: Brand: CURITY

## (undated) DEVICE — APPLICATOR MEDICATED 26 CC SOLUTION HI LT ORNG CHLORAPREP

## (undated) DEVICE — SUTURE PERMAHAND SZ 2-0 L30IN NONABSORBABLE BLK SILK W/O A305H

## (undated) DEVICE — PODIATRY PK

## (undated) DEVICE — INTENDED FOR TISSUE SEPARATION, AND OTHER PROCEDURES THAT REQUIRE A SHARP SURGICAL BLADE TO PUNCTURE OR CUT.: Brand: BARD-PARKER ® CARBON RIB-BACK BLADES

## (undated) DEVICE — DRAPE,REIN 53X77,STERILE: Brand: MEDLINE

## (undated) DEVICE — GLOVE ORANGE PI 7 1/2   MSG9075

## (undated) DEVICE — BITE BLOCK ENDOSCP AD 60 FR W/ ADJ STRP PLAS GRN BLOX

## (undated) DEVICE — CLEANER,CAUTERY TIP,2X2",STERILE: Brand: MEDLINE

## (undated) DEVICE — STANDARD HYPODERMIC NEEDLE,POLYPROPYLENE HUB: Brand: MONOJECT

## (undated) DEVICE — SUTURE VCRL SZ 3-0 L27IN ABSRB UD L26MM SH 1/2 CIR J416H

## (undated) DEVICE — MERCY HEALTH WEST TURNOVER: Brand: MEDLINE INDUSTRIES, INC.

## (undated) DEVICE — GLOVE ORANGE PI 8   MSG9080

## (undated) DEVICE — 3M™ COBAN™ NL STERILE NON-LATEX SELF-ADHERENT WRAP, 2084S, 4 IN X 5 YD (10 CM X 4,5 M), 18 ROLLS/CASE: Brand: 3M™ COBAN™

## (undated) DEVICE — NEEDLE 25GA X 1.5 IN ECLIPSE

## (undated) DEVICE — LIQUIBAND RAPID ADHESIVE 36/CS 0.8ML: Brand: MEDLINE

## (undated) DEVICE — SUTURE PERMAHAND SZ 3-0 L30IN NONABSORBABLE BLK SILK BRAID A304H

## (undated) DEVICE — SOLUTION IV 1000ML 0.9% SOD CHL

## (undated) DEVICE — SPONGE LAP W18XL18IN WHT COT 4 PLY FLD STRUNG RADPQ DISP ST 2 PER PACK

## (undated) DEVICE — SOLUTION IRRIG 1000ML 0.9% SOD CHL USP POUR PLAS BTL

## (undated) DEVICE — SUTURE NONABSORBABLE MONOFILAMENT 4-0 PS-2 18 IN BLK ETHILON 1667G

## (undated) DEVICE — SUTURE VCRL SZ 4-0 L18IN ABSRB UD L24MM PS-1 3/8 CIR PRIM J682H

## (undated) DEVICE — SUTURE VCRL SZ 3-0 L27IN ABSRB UD L26MM CT-2 1/2 CIR J232H

## (undated) DEVICE — STERILE VELCLOSE ELASTIC BANDAGE, 4IN: Brand: VELCLOSE

## (undated) DEVICE — SUTURE VCRL SZ 3-0 L18IN ABSRB UD PS-2 L19MM 3/8 CRV PRIM J497H

## (undated) DEVICE — TUBING, SUCTION, 1/4" X 12', STRAIGHT: Brand: MEDLINE

## (undated) DEVICE — SUTURE VCRL SZ 4-0 L27IN ABSRB UD L26MM SH 1/2 CIR J415H

## (undated) DEVICE — SUTURE VCRL SZ 2-0 L27IN ABSRB UD L26MM SH 1/2 CIR J417H

## (undated) DEVICE — GOWN SIRUS NONREIN XL W/TWL: Brand: MEDLINE INDUSTRIES, INC.

## (undated) DEVICE — SUTURE PERMAHAND SZ 2-0 L18IN NONABSORBABLE BLK L26MM FS 685G

## (undated) DEVICE — JEWISH HOSPITAL TURNOVER KIT: Brand: MEDLINE INDUSTRIES, INC.

## (undated) DEVICE — PLATE ES AD W 9FT CRD 2

## (undated) DEVICE — ENDOSCOPIC ULTRASOUND FINE NEEDLE BIOPSY (FNB) DEVICE: Brand: ACQUIRE

## (undated) DEVICE — SUTURE VICRYL + SZ 3-0 L27IN ABSRB UD L26MM SH 1/2 CIR VCP416H

## (undated) DEVICE — GLOVE ORANGE PI 7   MSG9070

## (undated) DEVICE — 1010 S-DRAPE TOWEL DRAPE 10/BX: Brand: STERI-DRAPE™

## (undated) DEVICE — DRAPE,T,LAPARO,TRANS,STERILE: Brand: MEDLINE

## (undated) DEVICE — ELECTRODE PT RET AD L9FT HI MOIST COND ADH HYDRGEL CORDED

## (undated) DEVICE — Device

## (undated) DEVICE — SYRINGE MED 3ML CLR PLAS STD N CTRL LUERLOCK TIP DISP

## (undated) DEVICE — COVER,MAYO STAND,XL,STERILE: Brand: MEDLINE

## (undated) DEVICE — SUTURE COAT VCRL SZ 4-0 L18IN ABSRB UD L19MM PS-2 1/2 CIR J496G

## (undated) DEVICE — MINOR SET UP: Brand: MEDLINE INDUSTRIES, INC.

## (undated) DEVICE — SYRINGE IRRIG 60ML SFT PLIABLE BLB EZ TO GRP 1 HND USE W/

## (undated) DEVICE — SYSTEM RETENTION PANNUS 2 PADS 1 STRAP

## (undated) DEVICE — TRANSFER SET 3": Brand: MEDLINE INDUSTRIES, INC.

## (undated) DEVICE — SUTURE ABSORBABLE L 18 IN SZ 4-0 NDL L 19 MM POLYGLACTIN 910 36/BX

## (undated) DEVICE — SPLNT ORTHO GLASS 4X15